# Patient Record
Sex: MALE | Race: OTHER | HISPANIC OR LATINO | URBAN - METROPOLITAN AREA
[De-identification: names, ages, dates, MRNs, and addresses within clinical notes are randomized per-mention and may not be internally consistent; named-entity substitution may affect disease eponyms.]

---

## 2023-11-14 ENCOUNTER — INPATIENT (INPATIENT)
Facility: HOSPITAL | Age: 38
LOS: 4 days | Discharge: HOME | DRG: 896 | End: 2023-11-19
Attending: INTERNAL MEDICINE | Admitting: INTERNAL MEDICINE
Payer: COMMERCIAL

## 2023-11-14 VITALS
DIASTOLIC BLOOD PRESSURE: 84 MMHG | RESPIRATION RATE: 17 BRPM | WEIGHT: 160.06 LBS | TEMPERATURE: 99 F | HEART RATE: 122 BPM | HEIGHT: 64 IN | OXYGEN SATURATION: 98 % | SYSTOLIC BLOOD PRESSURE: 146 MMHG

## 2023-11-14 DIAGNOSIS — K76.0 FATTY (CHANGE OF) LIVER, NOT ELSEWHERE CLASSIFIED: ICD-10-CM

## 2023-11-14 DIAGNOSIS — I10 ESSENTIAL (PRIMARY) HYPERTENSION: ICD-10-CM

## 2023-11-14 DIAGNOSIS — K22.89 OTHER SPECIFIED DISEASE OF ESOPHAGUS: ICD-10-CM

## 2023-11-14 DIAGNOSIS — E87.6 HYPOKALEMIA: ICD-10-CM

## 2023-11-14 DIAGNOSIS — F10.939 ALCOHOL USE, UNSPECIFIED WITH WITHDRAWAL, UNSPECIFIED: ICD-10-CM

## 2023-11-14 DIAGNOSIS — K85.90 ACUTE PANCREATITIS WITHOUT NECROSIS OR INFECTION, UNSPECIFIED: ICD-10-CM

## 2023-11-14 DIAGNOSIS — Z29.9 ENCOUNTER FOR PROPHYLACTIC MEASURES, UNSPECIFIED: ICD-10-CM

## 2023-11-14 LAB
ALBUMIN SERPL ELPH-MCNC: 2.5 G/DL — LOW (ref 3.5–5)
ALBUMIN SERPL ELPH-MCNC: 2.5 G/DL — LOW (ref 3.5–5)
ALBUMIN SERPL ELPH-MCNC: 3.2 G/DL — LOW (ref 3.5–5)
ALBUMIN SERPL ELPH-MCNC: 3.2 G/DL — LOW (ref 3.5–5)
ALP SERPL-CCNC: 133 U/L — HIGH (ref 40–120)
ALP SERPL-CCNC: 133 U/L — HIGH (ref 40–120)
ALP SERPL-CCNC: 98 U/L — SIGNIFICANT CHANGE UP (ref 40–120)
ALP SERPL-CCNC: 98 U/L — SIGNIFICANT CHANGE UP (ref 40–120)
ALT FLD-CCNC: 29 U/L DA — SIGNIFICANT CHANGE UP (ref 10–60)
ALT FLD-CCNC: 29 U/L DA — SIGNIFICANT CHANGE UP (ref 10–60)
ALT FLD-CCNC: 36 U/L DA — SIGNIFICANT CHANGE UP (ref 10–60)
ALT FLD-CCNC: 36 U/L DA — SIGNIFICANT CHANGE UP (ref 10–60)
ANION GAP SERPL CALC-SCNC: 10 MMOL/L — SIGNIFICANT CHANGE UP (ref 5–17)
ANION GAP SERPL CALC-SCNC: 10 MMOL/L — SIGNIFICANT CHANGE UP (ref 5–17)
ANION GAP SERPL CALC-SCNC: 12 MMOL/L — SIGNIFICANT CHANGE UP (ref 5–17)
ANION GAP SERPL CALC-SCNC: 12 MMOL/L — SIGNIFICANT CHANGE UP (ref 5–17)
APAP SERPL-MCNC: <2 UG/ML — LOW (ref 10–30)
APAP SERPL-MCNC: <2 UG/ML — LOW (ref 10–30)
APPEARANCE UR: CLEAR — SIGNIFICANT CHANGE UP
APPEARANCE UR: CLEAR — SIGNIFICANT CHANGE UP
AST SERPL-CCNC: 34 U/L — SIGNIFICANT CHANGE UP (ref 10–40)
AST SERPL-CCNC: 34 U/L — SIGNIFICANT CHANGE UP (ref 10–40)
AST SERPL-CCNC: 49 U/L — HIGH (ref 10–40)
AST SERPL-CCNC: 49 U/L — HIGH (ref 10–40)
BACTERIA # UR AUTO: NEGATIVE /HPF — SIGNIFICANT CHANGE UP
BACTERIA # UR AUTO: NEGATIVE /HPF — SIGNIFICANT CHANGE UP
BASOPHILS # BLD AUTO: 0.04 K/UL — SIGNIFICANT CHANGE UP (ref 0–0.2)
BASOPHILS # BLD AUTO: 0.04 K/UL — SIGNIFICANT CHANGE UP (ref 0–0.2)
BASOPHILS NFR BLD AUTO: 0.6 % — SIGNIFICANT CHANGE UP (ref 0–2)
BASOPHILS NFR BLD AUTO: 0.6 % — SIGNIFICANT CHANGE UP (ref 0–2)
BILIRUB SERPL-MCNC: 1 MG/DL — SIGNIFICANT CHANGE UP (ref 0.2–1.2)
BILIRUB SERPL-MCNC: 1 MG/DL — SIGNIFICANT CHANGE UP (ref 0.2–1.2)
BILIRUB SERPL-MCNC: 1.1 MG/DL — SIGNIFICANT CHANGE UP (ref 0.2–1.2)
BILIRUB SERPL-MCNC: 1.1 MG/DL — SIGNIFICANT CHANGE UP (ref 0.2–1.2)
BILIRUB UR-MCNC: NEGATIVE — SIGNIFICANT CHANGE UP
BILIRUB UR-MCNC: NEGATIVE — SIGNIFICANT CHANGE UP
BUN SERPL-MCNC: 8 MG/DL — SIGNIFICANT CHANGE UP (ref 7–18)
CALCIUM SERPL-MCNC: 6.6 MG/DL — LOW (ref 8.4–10.5)
CALCIUM SERPL-MCNC: 6.6 MG/DL — LOW (ref 8.4–10.5)
CALCIUM SERPL-MCNC: 8.2 MG/DL — LOW (ref 8.4–10.5)
CALCIUM SERPL-MCNC: 8.2 MG/DL — LOW (ref 8.4–10.5)
CHLORIDE SERPL-SCNC: 106 MMOL/L — SIGNIFICANT CHANGE UP (ref 96–108)
CHLORIDE SERPL-SCNC: 106 MMOL/L — SIGNIFICANT CHANGE UP (ref 96–108)
CHLORIDE SERPL-SCNC: 99 MMOL/L — SIGNIFICANT CHANGE UP (ref 96–108)
CHLORIDE SERPL-SCNC: 99 MMOL/L — SIGNIFICANT CHANGE UP (ref 96–108)
CO2 SERPL-SCNC: 22 MMOL/L — SIGNIFICANT CHANGE UP (ref 22–31)
CO2 SERPL-SCNC: 22 MMOL/L — SIGNIFICANT CHANGE UP (ref 22–31)
CO2 SERPL-SCNC: 25 MMOL/L — SIGNIFICANT CHANGE UP (ref 22–31)
CO2 SERPL-SCNC: 25 MMOL/L — SIGNIFICANT CHANGE UP (ref 22–31)
COLOR SPEC: YELLOW — SIGNIFICANT CHANGE UP
COLOR SPEC: YELLOW — SIGNIFICANT CHANGE UP
CREAT SERPL-MCNC: 0.71 MG/DL — SIGNIFICANT CHANGE UP (ref 0.5–1.3)
CREAT SERPL-MCNC: 0.71 MG/DL — SIGNIFICANT CHANGE UP (ref 0.5–1.3)
CREAT SERPL-MCNC: 0.79 MG/DL — SIGNIFICANT CHANGE UP (ref 0.5–1.3)
CREAT SERPL-MCNC: 0.79 MG/DL — SIGNIFICANT CHANGE UP (ref 0.5–1.3)
DIFF PNL FLD: NEGATIVE — SIGNIFICANT CHANGE UP
DIFF PNL FLD: NEGATIVE — SIGNIFICANT CHANGE UP
EGFR: 115 ML/MIN/1.73M2 — SIGNIFICANT CHANGE UP
EGFR: 115 ML/MIN/1.73M2 — SIGNIFICANT CHANGE UP
EGFR: 119 ML/MIN/1.73M2 — SIGNIFICANT CHANGE UP
EGFR: 119 ML/MIN/1.73M2 — SIGNIFICANT CHANGE UP
EOSINOPHIL # BLD AUTO: 0.02 K/UL — SIGNIFICANT CHANGE UP (ref 0–0.5)
EOSINOPHIL # BLD AUTO: 0.02 K/UL — SIGNIFICANT CHANGE UP (ref 0–0.5)
EOSINOPHIL NFR BLD AUTO: 0.3 % — SIGNIFICANT CHANGE UP (ref 0–6)
EOSINOPHIL NFR BLD AUTO: 0.3 % — SIGNIFICANT CHANGE UP (ref 0–6)
EPI CELLS # UR: PRESENT
EPI CELLS # UR: PRESENT
ETHANOL SERPL-MCNC: 351 MG/DL — HIGH (ref 0–10)
ETHANOL SERPL-MCNC: 351 MG/DL — HIGH (ref 0–10)
GLUCOSE SERPL-MCNC: 124 MG/DL — HIGH (ref 70–99)
GLUCOSE SERPL-MCNC: 124 MG/DL — HIGH (ref 70–99)
GLUCOSE SERPL-MCNC: 156 MG/DL — HIGH (ref 70–99)
GLUCOSE SERPL-MCNC: 156 MG/DL — HIGH (ref 70–99)
GLUCOSE UR QL: NEGATIVE MG/DL — SIGNIFICANT CHANGE UP
GLUCOSE UR QL: NEGATIVE MG/DL — SIGNIFICANT CHANGE UP
HCT VFR BLD CALC: 47 % — SIGNIFICANT CHANGE UP (ref 39–50)
HCT VFR BLD CALC: 47 % — SIGNIFICANT CHANGE UP (ref 39–50)
HGB BLD-MCNC: 16.6 G/DL — SIGNIFICANT CHANGE UP (ref 13–17)
HGB BLD-MCNC: 16.6 G/DL — SIGNIFICANT CHANGE UP (ref 13–17)
HIV 1 & 2 AB SERPL IA.RAPID: SIGNIFICANT CHANGE UP
HIV 1 & 2 AB SERPL IA.RAPID: SIGNIFICANT CHANGE UP
IMM GRANULOCYTES NFR BLD AUTO: 0.3 % — SIGNIFICANT CHANGE UP (ref 0–0.9)
IMM GRANULOCYTES NFR BLD AUTO: 0.3 % — SIGNIFICANT CHANGE UP (ref 0–0.9)
KETONES UR-MCNC: NEGATIVE MG/DL — SIGNIFICANT CHANGE UP
KETONES UR-MCNC: NEGATIVE MG/DL — SIGNIFICANT CHANGE UP
LEUKOCYTE ESTERASE UR-ACNC: NEGATIVE — SIGNIFICANT CHANGE UP
LEUKOCYTE ESTERASE UR-ACNC: NEGATIVE — SIGNIFICANT CHANGE UP
LIDOCAIN IGE QN: 620 U/L — HIGH (ref 13–75)
LIDOCAIN IGE QN: 620 U/L — HIGH (ref 13–75)
LYMPHOCYTES # BLD AUTO: 2.14 K/UL — SIGNIFICANT CHANGE UP (ref 1–3.3)
LYMPHOCYTES # BLD AUTO: 2.14 K/UL — SIGNIFICANT CHANGE UP (ref 1–3.3)
LYMPHOCYTES # BLD AUTO: 30.1 % — SIGNIFICANT CHANGE UP (ref 13–44)
LYMPHOCYTES # BLD AUTO: 30.1 % — SIGNIFICANT CHANGE UP (ref 13–44)
MAGNESIUM SERPL-MCNC: 2.5 MG/DL — SIGNIFICANT CHANGE UP (ref 1.6–2.6)
MAGNESIUM SERPL-MCNC: 2.5 MG/DL — SIGNIFICANT CHANGE UP (ref 1.6–2.6)
MCHC RBC-ENTMCNC: 30.4 PG — SIGNIFICANT CHANGE UP (ref 27–34)
MCHC RBC-ENTMCNC: 30.4 PG — SIGNIFICANT CHANGE UP (ref 27–34)
MCHC RBC-ENTMCNC: 35.3 GM/DL — SIGNIFICANT CHANGE UP (ref 32–36)
MCHC RBC-ENTMCNC: 35.3 GM/DL — SIGNIFICANT CHANGE UP (ref 32–36)
MCV RBC AUTO: 86.1 FL — SIGNIFICANT CHANGE UP (ref 80–100)
MCV RBC AUTO: 86.1 FL — SIGNIFICANT CHANGE UP (ref 80–100)
MONOCYTES # BLD AUTO: 0.66 K/UL — SIGNIFICANT CHANGE UP (ref 0–0.9)
MONOCYTES # BLD AUTO: 0.66 K/UL — SIGNIFICANT CHANGE UP (ref 0–0.9)
MONOCYTES NFR BLD AUTO: 9.3 % — SIGNIFICANT CHANGE UP (ref 2–14)
MONOCYTES NFR BLD AUTO: 9.3 % — SIGNIFICANT CHANGE UP (ref 2–14)
NEUTROPHILS # BLD AUTO: 4.24 K/UL — SIGNIFICANT CHANGE UP (ref 1.8–7.4)
NEUTROPHILS # BLD AUTO: 4.24 K/UL — SIGNIFICANT CHANGE UP (ref 1.8–7.4)
NEUTROPHILS NFR BLD AUTO: 59.4 % — SIGNIFICANT CHANGE UP (ref 43–77)
NEUTROPHILS NFR BLD AUTO: 59.4 % — SIGNIFICANT CHANGE UP (ref 43–77)
NITRITE UR-MCNC: NEGATIVE — SIGNIFICANT CHANGE UP
NITRITE UR-MCNC: NEGATIVE — SIGNIFICANT CHANGE UP
NRBC # BLD: 0 /100 WBCS — SIGNIFICANT CHANGE UP (ref 0–0)
NRBC # BLD: 0 /100 WBCS — SIGNIFICANT CHANGE UP (ref 0–0)
PH UR: 7 — SIGNIFICANT CHANGE UP (ref 5–8)
PH UR: 7 — SIGNIFICANT CHANGE UP (ref 5–8)
PHOSPHATE SERPL-MCNC: 1.9 MG/DL — LOW (ref 2.5–4.5)
PHOSPHATE SERPL-MCNC: 1.9 MG/DL — LOW (ref 2.5–4.5)
PLATELET # BLD AUTO: 138 K/UL — LOW (ref 150–400)
PLATELET # BLD AUTO: 138 K/UL — LOW (ref 150–400)
POTASSIUM SERPL-MCNC: 3 MMOL/L — LOW (ref 3.5–5.3)
POTASSIUM SERPL-MCNC: 3 MMOL/L — LOW (ref 3.5–5.3)
POTASSIUM SERPL-MCNC: 3.2 MMOL/L — LOW (ref 3.5–5.3)
POTASSIUM SERPL-MCNC: 3.2 MMOL/L — LOW (ref 3.5–5.3)
POTASSIUM SERPL-SCNC: 3 MMOL/L — LOW (ref 3.5–5.3)
POTASSIUM SERPL-SCNC: 3 MMOL/L — LOW (ref 3.5–5.3)
POTASSIUM SERPL-SCNC: 3.2 MMOL/L — LOW (ref 3.5–5.3)
POTASSIUM SERPL-SCNC: 3.2 MMOL/L — LOW (ref 3.5–5.3)
PROT SERPL-MCNC: 5.9 G/DL — LOW (ref 6–8.3)
PROT SERPL-MCNC: 5.9 G/DL — LOW (ref 6–8.3)
PROT SERPL-MCNC: 7.5 G/DL — SIGNIFICANT CHANGE UP (ref 6–8.3)
PROT SERPL-MCNC: 7.5 G/DL — SIGNIFICANT CHANGE UP (ref 6–8.3)
PROT UR-MCNC: ABNORMAL MG/DL
PROT UR-MCNC: ABNORMAL MG/DL
RBC # BLD: 5.46 M/UL — SIGNIFICANT CHANGE UP (ref 4.2–5.8)
RBC # BLD: 5.46 M/UL — SIGNIFICANT CHANGE UP (ref 4.2–5.8)
RBC # FLD: 13.3 % — SIGNIFICANT CHANGE UP (ref 10.3–14.5)
RBC # FLD: 13.3 % — SIGNIFICANT CHANGE UP (ref 10.3–14.5)
RBC CASTS # UR COMP ASSIST: 2 /HPF — SIGNIFICANT CHANGE UP (ref 0–4)
RBC CASTS # UR COMP ASSIST: 2 /HPF — SIGNIFICANT CHANGE UP (ref 0–4)
SALICYLATES SERPL-MCNC: <1.7 MG/DL — LOW (ref 2.8–20)
SALICYLATES SERPL-MCNC: <1.7 MG/DL — LOW (ref 2.8–20)
SODIUM SERPL-SCNC: 136 MMOL/L — SIGNIFICANT CHANGE UP (ref 135–145)
SODIUM SERPL-SCNC: 136 MMOL/L — SIGNIFICANT CHANGE UP (ref 135–145)
SODIUM SERPL-SCNC: 138 MMOL/L — SIGNIFICANT CHANGE UP (ref 135–145)
SODIUM SERPL-SCNC: 138 MMOL/L — SIGNIFICANT CHANGE UP (ref 135–145)
SP GR SPEC: 1.01 — SIGNIFICANT CHANGE UP (ref 1–1.03)
SP GR SPEC: 1.01 — SIGNIFICANT CHANGE UP (ref 1–1.03)
TROPONIN I, HIGH SENSITIVITY RESULT: 7.2 NG/L — SIGNIFICANT CHANGE UP
TROPONIN I, HIGH SENSITIVITY RESULT: 7.2 NG/L — SIGNIFICANT CHANGE UP
UROBILINOGEN FLD QL: 0.2 MG/DL — SIGNIFICANT CHANGE UP (ref 0.2–1)
UROBILINOGEN FLD QL: 0.2 MG/DL — SIGNIFICANT CHANGE UP (ref 0.2–1)
WBC # BLD: 7.12 K/UL — SIGNIFICANT CHANGE UP (ref 3.8–10.5)
WBC # BLD: 7.12 K/UL — SIGNIFICANT CHANGE UP (ref 3.8–10.5)
WBC # FLD AUTO: 7.12 K/UL — SIGNIFICANT CHANGE UP (ref 3.8–10.5)
WBC # FLD AUTO: 7.12 K/UL — SIGNIFICANT CHANGE UP (ref 3.8–10.5)
WBC UR QL: 4 /HPF — SIGNIFICANT CHANGE UP (ref 0–5)
WBC UR QL: 4 /HPF — SIGNIFICANT CHANGE UP (ref 0–5)

## 2023-11-14 PROCEDURE — 99285 EMERGENCY DEPT VISIT HI MDM: CPT

## 2023-11-14 PROCEDURE — 74177 CT ABD & PELVIS W/CONTRAST: CPT | Mod: 26,MA

## 2023-11-14 RX ORDER — ONDANSETRON 8 MG/1
4 TABLET, FILM COATED ORAL ONCE
Refills: 0 | Status: COMPLETED | OUTPATIENT
Start: 2023-11-14 | End: 2023-11-14

## 2023-11-14 RX ORDER — MORPHINE SULFATE 50 MG/1
2 CAPSULE, EXTENDED RELEASE ORAL EVERY 6 HOURS
Refills: 0 | Status: DISCONTINUED | OUTPATIENT
Start: 2023-11-14 | End: 2023-11-16

## 2023-11-14 RX ORDER — FOLIC ACID 0.8 MG
1 TABLET ORAL ONCE
Refills: 0 | Status: COMPLETED | OUTPATIENT
Start: 2023-11-14 | End: 2023-11-14

## 2023-11-14 RX ORDER — THIAMINE MONONITRATE (VIT B1) 100 MG
500 TABLET ORAL THREE TIMES A DAY
Refills: 0 | Status: DISCONTINUED | OUTPATIENT
Start: 2023-11-14 | End: 2023-11-16

## 2023-11-14 RX ORDER — SODIUM CHLORIDE 9 MG/ML
2000 INJECTION INTRAMUSCULAR; INTRAVENOUS; SUBCUTANEOUS ONCE
Refills: 0 | Status: COMPLETED | OUTPATIENT
Start: 2023-11-14 | End: 2023-11-14

## 2023-11-14 RX ORDER — ENOXAPARIN SODIUM 100 MG/ML
40 INJECTION SUBCUTANEOUS EVERY 24 HOURS
Refills: 0 | Status: DISCONTINUED | OUTPATIENT
Start: 2023-11-14 | End: 2023-11-19

## 2023-11-14 RX ORDER — FOLIC ACID 0.8 MG
1 TABLET ORAL DAILY
Refills: 0 | Status: ACTIVE | OUTPATIENT
Start: 2023-11-14 | End: 2024-10-12

## 2023-11-14 RX ORDER — SODIUM CHLORIDE 9 MG/ML
1000 INJECTION, SOLUTION INTRAVENOUS
Refills: 0 | Status: DISCONTINUED | OUTPATIENT
Start: 2023-11-14 | End: 2023-11-14

## 2023-11-14 RX ORDER — ONDANSETRON 8 MG/1
4 TABLET, FILM COATED ORAL EVERY 8 HOURS
Refills: 0 | Status: DISCONTINUED | OUTPATIENT
Start: 2023-11-14 | End: 2023-11-19

## 2023-11-14 RX ORDER — DIAZEPAM 5 MG
5 TABLET ORAL ONCE
Refills: 0 | Status: DISCONTINUED | OUTPATIENT
Start: 2023-11-14 | End: 2023-11-14

## 2023-11-14 RX ORDER — SODIUM,POTASSIUM PHOSPHATES 278-250MG
1 POWDER IN PACKET (EA) ORAL ONCE
Refills: 0 | Status: COMPLETED | OUTPATIENT
Start: 2023-11-14 | End: 2023-11-14

## 2023-11-14 RX ORDER — MULTIVIT-MIN/FERROUS GLUCONATE 9 MG/15 ML
1 LIQUID (ML) ORAL DAILY
Refills: 0 | Status: DISCONTINUED | OUTPATIENT
Start: 2023-11-14 | End: 2023-11-19

## 2023-11-14 RX ORDER — THIAMINE MONONITRATE (VIT B1) 100 MG
100 TABLET ORAL ONCE
Refills: 0 | Status: COMPLETED | OUTPATIENT
Start: 2023-11-14 | End: 2023-11-14

## 2023-11-14 RX ORDER — IOHEXOL 300 MG/ML
30 INJECTION, SOLUTION INTRAVENOUS ONCE
Refills: 0 | Status: DISCONTINUED | OUTPATIENT
Start: 2023-11-14 | End: 2023-11-18

## 2023-11-14 RX ORDER — HYDROMORPHONE HYDROCHLORIDE 2 MG/ML
1 INJECTION INTRAMUSCULAR; INTRAVENOUS; SUBCUTANEOUS EVERY 6 HOURS
Refills: 0 | Status: DISCONTINUED | OUTPATIENT
Start: 2023-11-14 | End: 2023-11-16

## 2023-11-14 RX ORDER — POTASSIUM CHLORIDE 20 MEQ
40 PACKET (EA) ORAL ONCE
Refills: 0 | Status: COMPLETED | OUTPATIENT
Start: 2023-11-14 | End: 2023-11-14

## 2023-11-14 RX ORDER — SODIUM CHLORIDE 9 MG/ML
1000 INJECTION, SOLUTION INTRAVENOUS
Refills: 0 | Status: DISCONTINUED | OUTPATIENT
Start: 2023-11-14 | End: 2023-11-15

## 2023-11-14 RX ORDER — SODIUM CHLORIDE 9 MG/ML
500 INJECTION, SOLUTION INTRAVENOUS ONCE
Refills: 0 | Status: COMPLETED | OUTPATIENT
Start: 2023-11-14 | End: 2023-11-14

## 2023-11-14 RX ORDER — PANTOPRAZOLE SODIUM 20 MG/1
40 TABLET, DELAYED RELEASE ORAL DAILY
Refills: 0 | Status: DISCONTINUED | OUTPATIENT
Start: 2023-11-14 | End: 2023-11-18

## 2023-11-14 RX ORDER — MORPHINE SULFATE 50 MG/1
2 CAPSULE, EXTENDED RELEASE ORAL EVERY 6 HOURS
Refills: 0 | Status: DISCONTINUED | OUTPATIENT
Start: 2023-11-14 | End: 2023-11-14

## 2023-11-14 RX ORDER — MORPHINE SULFATE 50 MG/1
4 CAPSULE, EXTENDED RELEASE ORAL ONCE
Refills: 0 | Status: DISCONTINUED | OUTPATIENT
Start: 2023-11-14 | End: 2023-11-14

## 2023-11-14 RX ADMIN — ONDANSETRON 4 MILLIGRAM(S): 8 TABLET, FILM COATED ORAL at 22:59

## 2023-11-14 RX ADMIN — Medication 2 MILLIGRAM(S): at 22:59

## 2023-11-14 RX ADMIN — ONDANSETRON 4 MILLIGRAM(S): 8 TABLET, FILM COATED ORAL at 13:13

## 2023-11-14 RX ADMIN — Medication 1 TABLET(S): at 13:12

## 2023-11-14 RX ADMIN — SODIUM CHLORIDE 4000 MILLILITER(S): 9 INJECTION INTRAMUSCULAR; INTRAVENOUS; SUBCUTANEOUS at 13:14

## 2023-11-14 RX ADMIN — Medication 101 MILLIGRAM(S): at 17:17

## 2023-11-14 RX ADMIN — SODIUM CHLORIDE 150 MILLILITER(S): 9 INJECTION, SOLUTION INTRAVENOUS at 23:00

## 2023-11-14 RX ADMIN — Medication 40 MILLIEQUIVALENT(S): at 16:08

## 2023-11-14 RX ADMIN — ONDANSETRON 4 MILLIGRAM(S): 8 TABLET, FILM COATED ORAL at 19:51

## 2023-11-14 RX ADMIN — Medication 5 MILLIGRAM(S): at 17:14

## 2023-11-14 RX ADMIN — ENOXAPARIN SODIUM 40 MILLIGRAM(S): 100 INJECTION SUBCUTANEOUS at 19:51

## 2023-11-14 RX ADMIN — Medication 100 MILLIGRAM(S): at 13:13

## 2023-11-14 RX ADMIN — Medication 50 MILLIGRAM(S): at 13:12

## 2023-11-14 RX ADMIN — MORPHINE SULFATE 4 MILLIGRAM(S): 50 CAPSULE, EXTENDED RELEASE ORAL at 13:13

## 2023-11-14 RX ADMIN — Medication 1 MILLIGRAM(S): at 13:12

## 2023-11-14 RX ADMIN — SODIUM CHLORIDE 500 MILLILITER(S): 9 INJECTION, SOLUTION INTRAVENOUS at 19:51

## 2023-11-14 RX ADMIN — ONDANSETRON 4 MILLIGRAM(S): 8 TABLET, FILM COATED ORAL at 17:13

## 2023-11-14 NOTE — ED PROVIDER NOTE - CLINICAL SUMMARY MEDICAL DECISION MAKING FREE TEXT BOX
38 male with EtOH abuse presenting to the ED with diffuse abdominal pain/tenderness & multiple episodes of vomiting. Dehydrated appearing. Tremulous concerning for EtOH withdrawal    Vitals w tachycardia    Nontoxic appearing, n/v intact.    Plan to obtain:    -Labs, EKG, CT (r/o bowel obstruction, intra-abdominal infection, appendicitis, diverticulitis, colitis, abscess, perforation, or pancreatitis), IV fluids, analgesia/antiemetics as needed, benzodiazepines as needed for w/d, admit    Lab values demonstrate no acute/emergent pathology.    Independent interpretation of the EKG: Sinus @ ***, normal axis, normal intervals, normal ST/T  Independent interpretation of XR: ***    ***    Pt advised regarding need for close outpatient follow up.  Patient stable for further care in outpatient setting. No indication for inpatient admission at this time. Patient advised regarding symptomatic & supportive care and symptoms to prompt ED return. Strict return precautions provided.  ***  Patient requires inpatient admission for further care & stabilization. Care signed out to inpatient team. 38 male with EtOH abuse presenting to the ED with diffuse abdominal pain/tenderness & multiple episodes of vomiting. Dehydrated appearing. Tremulous concerning for EtOH withdrawal    Vitals w tachycardia    Nontoxic appearing, n/v intact.    Plan to obtain:    -Labs, EKG, CT (r/o bowel obstruction, intra-abdominal infection, appendicitis, diverticulitis, colitis, abscess, perforation, or pancreatitis), IV fluids, analgesia/antiemetics as needed, benzodiazepines as needed for w/d, admit    Lab values w elevated lipase, abnormal LFT, & elevated EtOH  Independent interpretation of the EKG: Sinus @ 115, normal axis, normal intervals, normal ST/T  IV fluids, analgesia, & antiemetics given  IV benzo's given for withdrawal    CT pending at time of sign out.

## 2023-11-14 NOTE — H&P ADULT - ATTENDING COMMENTS
Patient is a 38 year old male from home, ambulates independently, with PMH of HTN, alcohol use disorder with history of seizures 2/2 alcohol withdrawal earlier this year who presented to the ED with abdominal pain.  History difficult to obtain due to patient vomiting.  patient states starting 4 days ago he had significant abdominal pain, nausea and vomiting.  patient states he has had similar episodes to this in the past and was told he had pancreatitis.  Patient has previoulsy been hospitalized for this in the past, most recently in [facility] earlier this year.  Patient states he is a heavy drinker and drinks 14 cans of beers daily.  patient states his last drink was this AM.  Patient states he has previoulsy been hospitalized for alcohol withdrawal and seizures 2/2 alcohol withdrawal however has never been intubated.  Patient states 7 months ago he was admitted to an ICU for seizures 2/2 alcohol withdrawal.  patient states the pain does not radiate to the back currently.  Patient states the pain is diffuse constant abdominal pain, worst in the epigastrium and after eating.  Patient denies nausea, vomiting, headache, blurry vision, dizziness, chest pain, abdominal pain, constipation, diarrhea, leg swelling.   seen and examined  admitted for etoh intoxication and pancreatitis   pt is a heavy etoh  and as per him dx Pancreatitis over a year   ahs been drinking dozen of beer every day and allan 3-4 days has abd pain with vomiting  as per him had blood in vomiting before but not at this time   vs noted  tachycardia  130  rr 18    awake alert on bed  not in nay distress  nauseous    lungs heart  ok abd soft bs nml tender upper abd    cns tremers but alert awake   labs noted  k 3.0  lipase 620  etoh level 351    ct abd pancreatitis  yhickening of distal esophagitis   a/p acute pancraetitis  NPO  IV HYDRATIN  BMP  WATCH FOR K, MG    CBC  RENAL FUNCTIONS   TREMERS  dT  LIBRIUM/ATIVAN  GET utox  CXR

## 2023-11-14 NOTE — ED ADULT NURSE NOTE - OBJECTIVE STATEMENT
pt is a 38 y.o. male c/o alcohol withdrawal, pt reports last drink was today which was beer, pt reports a hx of high blood pressure, pt is ax4, and pt reports he is trying to stop drinking.

## 2023-11-14 NOTE — ED PROVIDER NOTE - PROGRESS NOTE DETAILS
Jose Rafaelks-DO: pt received at sign-out.  Pt sitting comfortably in NAD. Signout pending CT and admission. CT showing uncomplicated pancreatitis. Most recent CIWA 5. Discussed with hospitalist and MAR re: admission.

## 2023-11-14 NOTE — H&P ADULT - ASSESSMENT
Patient is a 38 year old male from home, ambulates independently, with PMH of HTN, alcohol use disorder with history of seizures 2/2 alcohol withdrawal earlier this year who presented to the ED with abdominal pain. In the ED patient had elevated BAL and elevated CIWA score.  Patient with CT A/P showing acute pancreatitis, dilated esophagus, and hepatic steatosis.  Patient is being admitted for acute pancreatitis and alcohol withdrawal.     Primary team to attempt to get records from prior hospitalizations at [facility].  Patient states he has had extensive workup in the past including possible Echo and US of liver.

## 2023-11-14 NOTE — H&P ADULT - PROBLEM SELECTOR PLAN 2
- Patient with history of alcohol use disorder  - Patient with history of seizures 2/2 alcohol withdrwal  - Patient drinks 14 beers daily  - Patients last drink this AM  - Start CIWA protocol  - Start Libruim leti  - Start Ativan PRN  - Start Folic Acid  - Start IV Thiamine 500mg TID  - Start Multivitamin  - Monitor electrolytes closely, replete as indicated    - Social work consulted

## 2023-11-14 NOTE — H&P ADULT - PROBLEM SELECTOR PLAN 3
- K of 3.2 on admission  - Monitor electrolytes closely, replete as indicated    - Repleted by ED with PO KCl 1 40meq  - F/U Repeat CMP @8

## 2023-11-14 NOTE — H&P ADULT - PROBLEM SELECTOR PLAN 1
- Patient with abdominal pain nausea, and vomiting for the past 4 days  - Patient with history of pancreatitis  - Patient tachycardic in ED, otherwise Hemodynamically stable and afebrile  - CT A/P: showing acute pancreatitis, dilated esophagus, and hepatic steatosis  - S/P 2L NS in ED  - Will give LR bolus now  - Start LR@250cc/hr for pancreatitis   - Start pain control with Morphine for moderate and Dilaudid for severe  - Zofran PRN  - PPI  - NPO  - Monitor electrolytes closely, replete as indicated    - GI Consult in AM

## 2023-11-14 NOTE — H&P ADULT - NSHPPHYSICALEXAM_GEN_ALL_CORE
T(C): 36.9 (11-14-23 @ 16:56), Max: 37 (11-14-23 @ 10:46)  T(F): 98.4 (11-14-23 @ 16:56), Max: 98.6 (11-14-23 @ 10:46)  HR: 125 (11-14-23 @ 16:56) (122 - 125)  BP: 131/80 (11-14-23 @ 16:56) (131/80 - 146/84)  RR: 20 (11-14-23 @ 16:56) (17 - 20)  SpO2: 97% (11-14-23 @ 16:56) (97% - 98%)    GENERAL: lying in bed; Vomiting during exam. Tremulous on exam  HEAD:  Atraumatic, Normocephalic  EYES: conjunctiva and sclera clear  ENMT: No tonsillar erythema, exudates, or enlargement;   NECK: Supple, normal appearance, No JVD;   NERVOUS SYSTEM:  Alert & Oriented X3,  non focal; elevated CIWA, tremulous  CHEST/LUNG: Lungs clear to auscultation bilaterally, No rales, rhonchi, wheezing   HEART: Tachycardic, Regular rhythm; No murmurs, rubs, or gallops  ABDOMEN: Soft, diffusely tender to palpation, worse in epigastrium, Nondistended;  EXTREMITIES:  2+ Peripheral Pulses, No clubbing, cyanosis, or edema  SKIN: No rashes or lesions;

## 2023-11-14 NOTE — ED PROVIDER NOTE - NS ED MD DISPO ISOLATION TYPES
Anxiety    Depression    Depression    High cholesterol    Hypertension    Hypertension, unspecified type    Mood disorder
None

## 2023-11-14 NOTE — ED PROVIDER NOTE - NS ED ROS FT
Constitutional: (-) fever (-) chills  HENT: (-) congestion (-) rhinorrhea (-) sore throat  Eyes: (-) pain (-) redness  Respiratory: (-) cough (-) shortness of breath (-) wheezing (-) stridor    Cardiovascular: (-) chest pain (-) palpitations (-) leg swelling  Gastrointestinal: (+) abdominal pain (-) blood in stool (no melena/hematochezia) (-) diarrhea (+) vomiting  Genitourinary: (-) dysuria (-) hematuria  Musculoskeletal: (-) gait problem (-) joint swelling (-) myalgias  Skin: (-) color change (-) rash  Neurological: (-) weakness (-) numbness (-) headaches (+) tremors  Psychiatric/Behavioral: (-) confusion

## 2023-11-14 NOTE — ED PROVIDER NOTE - OBJECTIVE STATEMENT
38 male with hx of EtOH abuse.   Pt presenting to the ED reporting diffuse abdominal pain & vomiting progressively worsening for the past 3 weeks with decreased p.o. intake.  Also with decreased EtOH intake.

## 2023-11-14 NOTE — ED ADULT NURSE NOTE - NSFALLRISKINTERV_ED_ALL_ED
Assistance OOB with selected safe patient handling equipment if applicable/Assistance with ambulation/Communicate fall risk and risk factors to all staff, patient, and family/Monitor gait and stability/Monitor for mental status changes and reorient to person, place, and time, as needed/Provide visual cue: yellow wristband, yellow gown, etc/Reinforce activity limits and safety measures with patient and family/Toileting schedule using arm’s reach rule for commode and bathroom/Use of alarms - bed, stretcher, chair and/or video monitoring/Call bell, personal items and telephone in reach/Instruct patient to call for assistance before getting out of bed/chair/stretcher/Non-slip footwear applied when patient is off stretcher/[place] to call system/Physically safe environment - no spills, clutter or unnecessary equipment/Purposeful Proactive Rounding/Room/bathroom lighting operational, light cord in reach

## 2023-11-14 NOTE — H&P ADULT - NSHPSOCIALHISTORY_GEN_ALL_CORE
38 yea old male, lives at home with wife.  Denies tobacco use.  Drinks 14 cans of beer daily.  Has previously enrolled in detox.

## 2023-11-14 NOTE — H&P ADULT - HISTORY OF PRESENT ILLNESS
Patient is a 38 year old male from home, ambulates independently, with PMH of HTN, alcohol use disorder with history of seizures 2/2 alcohol withdrawal earlier this year who presented to the ED .    Patient denies nausea, vomiting, headache, blurry vision, dizziness, chest pain, abdominal pain, constipation, diarrhea, leg swelling.  Patient is a 38 year old male from home, ambulates independently, with PMH of HTN, alcohol use disorder with history of seizures 2/2 alcohol withdrawal earlier this year who presented to the ED with abdominal pain.  History difficult to obtain due to patient vomiting.  patient states starting 4 days ago he had significant abdominal pain, nausea and vomiting.  patient states he has had similar episodes to this in the past and was told he had pancreatitis.  Patient has previoulsy been hospitalized for this in the past, most recently in [facility] earlier this year.  Patient states he is a heavy drinker and drinks 14 cans of beers daily.  patient states his last drink was this AM.  Patient states he has previoulsy been hospitalized for alcohol withdrawal and seizures 2/2 alcohol withdrawal however has never been intubated.  Patient states 7 months ago he was admitted to an ICU for seizures 2/2 alcohol withdrawal.  patient states the pain does not radiate to the back currently.  Patient states the pain is diffuse constant abdominal pain, worst in the epigastrium and after eating.  Patient denies nausea, vomiting, headache, blurry vision, dizziness, chest pain, abdominal pain, constipation, diarrhea, leg swelling.     Patient states he Has previously enrolled in detox.  Patient denies taking any medications at home

## 2023-11-14 NOTE — ED PROVIDER NOTE - PHYSICAL EXAMINATION
Gen:  Awake, alert, NAD, WDWN, NCAT, non-toxic appearing.   Eyes:  PERRL, EOMI, no icterus, normal lids/lashes, normal conjunctivae.  ENT:  External inspection normal, pink/dry membranes.   CV:  S1S2, regular rate and rhythm, no murmur/gallops/rubs, no JVD, 2+ pulses b/l, no edema/cords/homans, warm/well-perfused.  Resp:  Normal respiratory rate/effort, no respiratory distress, normal voice, speaking full sentences, lungs clear to auscultation b/l, no wheezing/rales/rhonchi, no retractions, no stridor.  Abd:  Soft abdomen, diffuse tender, no distended/guarding/rebound, no pulsatile mass, no CVA tender.   Musculoskeletal:  N/V intact, FROM all 4 extremities, normal motor tone  Neck:  FROM neck, supple, trachea midline, no meningismus.   Skin:  Color normal for race, warm and dry, no rash.  Neuro:  Oriented x3, CN 2-12 intact (grossly), normal motor (grossly), normal sensory (grossly), +tremors, GCS 15  Psych:  Attention normal. Affect normal. Behavior normal. Judgment normal. Denies AVH.

## 2023-11-14 NOTE — H&P ADULT - NSICDXPASTMEDICALHX_GEN_ALL_CORE_FT
PAST MEDICAL HISTORY:  Alcohol abuse     History of seizure due to alcohol withdrawal     HTN (hypertension)

## 2023-11-14 NOTE — H&P ADULT - PROBLEM SELECTOR PLAN 5
CT A/P showing acute pancreatitis, dilated esophagus, and hepatic steatosis  - AST/ALT: 49/36  - Alk Phos: 133  - Monitor CMP daily

## 2023-11-15 LAB
ALBUMIN SERPL ELPH-MCNC: 2.7 G/DL — LOW (ref 3.5–5)
ALBUMIN SERPL ELPH-MCNC: 2.7 G/DL — LOW (ref 3.5–5)
ALP SERPL-CCNC: 101 U/L — SIGNIFICANT CHANGE UP (ref 40–120)
ALP SERPL-CCNC: 101 U/L — SIGNIFICANT CHANGE UP (ref 40–120)
ALT FLD-CCNC: 27 U/L DA — SIGNIFICANT CHANGE UP (ref 10–60)
ALT FLD-CCNC: 27 U/L DA — SIGNIFICANT CHANGE UP (ref 10–60)
AMPHET UR-MCNC: NEGATIVE — SIGNIFICANT CHANGE UP
AMPHET UR-MCNC: NEGATIVE — SIGNIFICANT CHANGE UP
ANION GAP SERPL CALC-SCNC: 6 MMOL/L — SIGNIFICANT CHANGE UP (ref 5–17)
ANION GAP SERPL CALC-SCNC: 6 MMOL/L — SIGNIFICANT CHANGE UP (ref 5–17)
AST SERPL-CCNC: 33 U/L — SIGNIFICANT CHANGE UP (ref 10–40)
AST SERPL-CCNC: 33 U/L — SIGNIFICANT CHANGE UP (ref 10–40)
BARBITURATES UR SCN-MCNC: NEGATIVE — SIGNIFICANT CHANGE UP
BARBITURATES UR SCN-MCNC: NEGATIVE — SIGNIFICANT CHANGE UP
BENZODIAZ UR-MCNC: NEGATIVE — SIGNIFICANT CHANGE UP
BENZODIAZ UR-MCNC: NEGATIVE — SIGNIFICANT CHANGE UP
BILIRUB SERPL-MCNC: 1.5 MG/DL — HIGH (ref 0.2–1.2)
BILIRUB SERPL-MCNC: 1.5 MG/DL — HIGH (ref 0.2–1.2)
BUN SERPL-MCNC: 7 MG/DL — SIGNIFICANT CHANGE UP (ref 7–18)
BUN SERPL-MCNC: 7 MG/DL — SIGNIFICANT CHANGE UP (ref 7–18)
CALCIUM SERPL-MCNC: 6.6 MG/DL — LOW (ref 8.4–10.5)
CALCIUM SERPL-MCNC: 6.6 MG/DL — LOW (ref 8.4–10.5)
CHLORIDE SERPL-SCNC: 104 MMOL/L — SIGNIFICANT CHANGE UP (ref 96–108)
CHLORIDE SERPL-SCNC: 104 MMOL/L — SIGNIFICANT CHANGE UP (ref 96–108)
CO2 SERPL-SCNC: 26 MMOL/L — SIGNIFICANT CHANGE UP (ref 22–31)
CO2 SERPL-SCNC: 26 MMOL/L — SIGNIFICANT CHANGE UP (ref 22–31)
COCAINE METAB.OTHER UR-MCNC: NEGATIVE — SIGNIFICANT CHANGE UP
COCAINE METAB.OTHER UR-MCNC: NEGATIVE — SIGNIFICANT CHANGE UP
CREAT SERPL-MCNC: 0.76 MG/DL — SIGNIFICANT CHANGE UP (ref 0.5–1.3)
CREAT SERPL-MCNC: 0.76 MG/DL — SIGNIFICANT CHANGE UP (ref 0.5–1.3)
CULTURE RESULTS: SIGNIFICANT CHANGE UP
CULTURE RESULTS: SIGNIFICANT CHANGE UP
EGFR: 117 ML/MIN/1.73M2 — SIGNIFICANT CHANGE UP
EGFR: 117 ML/MIN/1.73M2 — SIGNIFICANT CHANGE UP
GLUCOSE SERPL-MCNC: 103 MG/DL — HIGH (ref 70–99)
GLUCOSE SERPL-MCNC: 103 MG/DL — HIGH (ref 70–99)
HCT VFR BLD CALC: 35.8 % — LOW (ref 39–50)
HCT VFR BLD CALC: 35.8 % — LOW (ref 39–50)
HGB BLD-MCNC: 12.3 G/DL — LOW (ref 13–17)
HGB BLD-MCNC: 12.3 G/DL — LOW (ref 13–17)
MAGNESIUM SERPL-MCNC: 1.8 MG/DL — SIGNIFICANT CHANGE UP (ref 1.6–2.6)
MAGNESIUM SERPL-MCNC: 1.8 MG/DL — SIGNIFICANT CHANGE UP (ref 1.6–2.6)
MCHC RBC-ENTMCNC: 30 PG — SIGNIFICANT CHANGE UP (ref 27–34)
MCHC RBC-ENTMCNC: 30 PG — SIGNIFICANT CHANGE UP (ref 27–34)
MCHC RBC-ENTMCNC: 34.4 GM/DL — SIGNIFICANT CHANGE UP (ref 32–36)
MCHC RBC-ENTMCNC: 34.4 GM/DL — SIGNIFICANT CHANGE UP (ref 32–36)
MCV RBC AUTO: 87.3 FL — SIGNIFICANT CHANGE UP (ref 80–100)
MCV RBC AUTO: 87.3 FL — SIGNIFICANT CHANGE UP (ref 80–100)
METHADONE UR-MCNC: NEGATIVE — SIGNIFICANT CHANGE UP
METHADONE UR-MCNC: NEGATIVE — SIGNIFICANT CHANGE UP
MRSA PCR RESULT.: SIGNIFICANT CHANGE UP
MRSA PCR RESULT.: SIGNIFICANT CHANGE UP
NRBC # BLD: 0 /100 WBCS — SIGNIFICANT CHANGE UP (ref 0–0)
NRBC # BLD: 0 /100 WBCS — SIGNIFICANT CHANGE UP (ref 0–0)
OPIATES UR-MCNC: POSITIVE
OPIATES UR-MCNC: POSITIVE
PCP SPEC-MCNC: SIGNIFICANT CHANGE UP
PCP SPEC-MCNC: SIGNIFICANT CHANGE UP
PCP UR-MCNC: NEGATIVE — SIGNIFICANT CHANGE UP
PCP UR-MCNC: NEGATIVE — SIGNIFICANT CHANGE UP
PHOSPHATE SERPL-MCNC: 1.9 MG/DL — LOW (ref 2.5–4.5)
PHOSPHATE SERPL-MCNC: 1.9 MG/DL — LOW (ref 2.5–4.5)
PLATELET # BLD AUTO: 103 K/UL — LOW (ref 150–400)
PLATELET # BLD AUTO: 103 K/UL — LOW (ref 150–400)
POTASSIUM SERPL-MCNC: 2.9 MMOL/L — CRITICAL LOW (ref 3.5–5.3)
POTASSIUM SERPL-MCNC: 2.9 MMOL/L — CRITICAL LOW (ref 3.5–5.3)
POTASSIUM SERPL-MCNC: 3.1 MMOL/L — LOW (ref 3.5–5.3)
POTASSIUM SERPL-MCNC: 3.1 MMOL/L — LOW (ref 3.5–5.3)
POTASSIUM SERPL-SCNC: 2.9 MMOL/L — CRITICAL LOW (ref 3.5–5.3)
POTASSIUM SERPL-SCNC: 2.9 MMOL/L — CRITICAL LOW (ref 3.5–5.3)
POTASSIUM SERPL-SCNC: 3.1 MMOL/L — LOW (ref 3.5–5.3)
POTASSIUM SERPL-SCNC: 3.1 MMOL/L — LOW (ref 3.5–5.3)
PROT SERPL-MCNC: 6.2 G/DL — SIGNIFICANT CHANGE UP (ref 6–8.3)
PROT SERPL-MCNC: 6.2 G/DL — SIGNIFICANT CHANGE UP (ref 6–8.3)
RBC # BLD: 4.1 M/UL — LOW (ref 4.2–5.8)
RBC # BLD: 4.1 M/UL — LOW (ref 4.2–5.8)
RBC # FLD: 13.8 % — SIGNIFICANT CHANGE UP (ref 10.3–14.5)
RBC # FLD: 13.8 % — SIGNIFICANT CHANGE UP (ref 10.3–14.5)
S AUREUS DNA NOSE QL NAA+PROBE: SIGNIFICANT CHANGE UP
S AUREUS DNA NOSE QL NAA+PROBE: SIGNIFICANT CHANGE UP
SODIUM SERPL-SCNC: 136 MMOL/L — SIGNIFICANT CHANGE UP (ref 135–145)
SODIUM SERPL-SCNC: 136 MMOL/L — SIGNIFICANT CHANGE UP (ref 135–145)
SPECIMEN SOURCE: SIGNIFICANT CHANGE UP
SPECIMEN SOURCE: SIGNIFICANT CHANGE UP
THC UR QL: NEGATIVE — SIGNIFICANT CHANGE UP
THC UR QL: NEGATIVE — SIGNIFICANT CHANGE UP
TROPONIN I, HIGH SENSITIVITY RESULT: 8.8 NG/L — SIGNIFICANT CHANGE UP
TROPONIN I, HIGH SENSITIVITY RESULT: 8.8 NG/L — SIGNIFICANT CHANGE UP
TROPONIN I, HIGH SENSITIVITY RESULT: 9.6 NG/L — SIGNIFICANT CHANGE UP
TROPONIN I, HIGH SENSITIVITY RESULT: 9.6 NG/L — SIGNIFICANT CHANGE UP
WBC # BLD: 7.51 K/UL — SIGNIFICANT CHANGE UP (ref 3.8–10.5)
WBC # BLD: 7.51 K/UL — SIGNIFICANT CHANGE UP (ref 3.8–10.5)
WBC # FLD AUTO: 7.51 K/UL — SIGNIFICANT CHANGE UP (ref 3.8–10.5)
WBC # FLD AUTO: 7.51 K/UL — SIGNIFICANT CHANGE UP (ref 3.8–10.5)

## 2023-11-15 RX ORDER — POTASSIUM CHLORIDE 20 MEQ
10 PACKET (EA) ORAL
Refills: 0 | Status: COMPLETED | OUTPATIENT
Start: 2023-11-15 | End: 2023-11-15

## 2023-11-15 RX ORDER — MAGNESIUM SULFATE 500 MG/ML
1 VIAL (ML) INJECTION ONCE
Refills: 0 | Status: COMPLETED | OUTPATIENT
Start: 2023-11-15 | End: 2023-11-15

## 2023-11-15 RX ORDER — POTASSIUM PHOSPHATE, MONOBASIC POTASSIUM PHOSPHATE, DIBASIC 236; 224 MG/ML; MG/ML
15 INJECTION, SOLUTION INTRAVENOUS ONCE
Refills: 0 | Status: COMPLETED | OUTPATIENT
Start: 2023-11-15 | End: 2023-11-15

## 2023-11-15 RX ORDER — INFLUENZA VIRUS VACCINE 15; 15; 15; 15 UG/.5ML; UG/.5ML; UG/.5ML; UG/.5ML
0.5 SUSPENSION INTRAMUSCULAR ONCE
Refills: 0 | Status: DISCONTINUED | OUTPATIENT
Start: 2023-11-15 | End: 2023-11-19

## 2023-11-15 RX ORDER — SODIUM CHLORIDE 9 MG/ML
1000 INJECTION, SOLUTION INTRAVENOUS
Refills: 0 | Status: DISCONTINUED | OUTPATIENT
Start: 2023-11-15 | End: 2023-11-16

## 2023-11-15 RX ADMIN — Medication 2 MILLIGRAM(S): at 09:57

## 2023-11-15 RX ADMIN — POTASSIUM PHOSPHATE, MONOBASIC POTASSIUM PHOSPHATE, DIBASIC 62.5 MILLIMOLE(S): 236; 224 INJECTION, SOLUTION INTRAVENOUS at 15:33

## 2023-11-15 RX ADMIN — Medication 100 MILLIEQUIVALENT(S): at 01:45

## 2023-11-15 RX ADMIN — Medication 1 MILLIGRAM(S): at 12:56

## 2023-11-15 RX ADMIN — Medication 100 MILLIEQUIVALENT(S): at 19:42

## 2023-11-15 RX ADMIN — MORPHINE SULFATE 2 MILLIGRAM(S): 50 CAPSULE, EXTENDED RELEASE ORAL at 20:40

## 2023-11-15 RX ADMIN — Medication 50 MILLIGRAM(S): at 22:45

## 2023-11-15 RX ADMIN — Medication 105 MILLIGRAM(S): at 06:36

## 2023-11-15 RX ADMIN — Medication 100 MILLIEQUIVALENT(S): at 02:37

## 2023-11-15 RX ADMIN — Medication 100 MILLIEQUIVALENT(S): at 20:54

## 2023-11-15 RX ADMIN — Medication 30 MILLILITER(S): at 00:40

## 2023-11-15 RX ADMIN — Medication 2 MILLIGRAM(S): at 22:45

## 2023-11-15 RX ADMIN — Medication 105 MILLIGRAM(S): at 00:15

## 2023-11-15 RX ADMIN — Medication 105 MILLIGRAM(S): at 22:45

## 2023-11-15 RX ADMIN — ENOXAPARIN SODIUM 40 MILLIGRAM(S): 100 INJECTION SUBCUTANEOUS at 19:00

## 2023-11-15 RX ADMIN — Medication 105 MILLIGRAM(S): at 14:33

## 2023-11-15 RX ADMIN — HYDROMORPHONE HYDROCHLORIDE 1 MILLIGRAM(S): 2 INJECTION INTRAMUSCULAR; INTRAVENOUS; SUBCUTANEOUS at 06:38

## 2023-11-15 RX ADMIN — Medication 100 MILLIEQUIVALENT(S): at 10:14

## 2023-11-15 RX ADMIN — Medication 50 MILLIGRAM(S): at 12:55

## 2023-11-15 RX ADMIN — Medication 50 MILLIGRAM(S): at 06:36

## 2023-11-15 RX ADMIN — Medication 100 MILLIEQUIVALENT(S): at 12:10

## 2023-11-15 RX ADMIN — Medication 50 MILLIGRAM(S): at 00:39

## 2023-11-15 RX ADMIN — PANTOPRAZOLE SODIUM 40 MILLIGRAM(S): 20 TABLET, DELAYED RELEASE ORAL at 12:56

## 2023-11-15 RX ADMIN — Medication 2 MILLIGRAM(S): at 16:16

## 2023-11-15 RX ADMIN — Medication 100 MILLIEQUIVALENT(S): at 13:59

## 2023-11-15 RX ADMIN — SODIUM CHLORIDE 100 MILLILITER(S): 9 INJECTION, SOLUTION INTRAVENOUS at 20:54

## 2023-11-15 RX ADMIN — Medication 100 MILLIEQUIVALENT(S): at 04:38

## 2023-11-15 RX ADMIN — Medication 2 MILLIGRAM(S): at 06:37

## 2023-11-15 RX ADMIN — Medication 100 MILLIEQUIVALENT(S): at 22:20

## 2023-11-15 RX ADMIN — MORPHINE SULFATE 2 MILLIGRAM(S): 50 CAPSULE, EXTENDED RELEASE ORAL at 20:04

## 2023-11-15 RX ADMIN — Medication 1 TABLET(S): at 12:55

## 2023-11-15 RX ADMIN — Medication 100 GRAM(S): at 08:52

## 2023-11-15 RX ADMIN — Medication 2 MILLIGRAM(S): at 01:20

## 2023-11-15 RX ADMIN — HYDROMORPHONE HYDROCHLORIDE 1 MILLIGRAM(S): 2 INJECTION INTRAMUSCULAR; INTRAVENOUS; SUBCUTANEOUS at 07:38

## 2023-11-15 RX ADMIN — Medication 2 MILLIGRAM(S): at 03:34

## 2023-11-15 NOTE — PROGRESS NOTE ADULT - ASSESSMENT
Patient is a 38 year old male from home, ambulates independently, with PMH of HTN, alcohol use disorder with history of seizures 2/2 alcohol withdrawal earlier this year who presented to the ED with abdominal pain. In the ED patient had elevated BAL and elevated CIWA score.  Patient with CT A/P showing acute pancreatitis, dilated esophagus, and hepatic steatosis.  Patient is being admitted for acute pancreatitis and alcohol withdrawal.

## 2023-11-15 NOTE — PROGRESS NOTE ADULT - SUBJECTIVE AND OBJECTIVE BOX
HPI:  Patient is a 38 year old male from home, ambulates independently, with PMH of HTN, alcohol use disorder with history of seizures 2/2 alcohol withdrawal earlier this year who presented to the ED with abdominal pain.  History difficult to obtain due to patient vomiting.  patient states starting 4 days ago he had significant abdominal pain, nausea and vomiting.  patient states he has had similar episodes to this in the past and was told he had pancreatitis.  Patient has previoulsy been hospitalized for this in the past, most recently in [facility] earlier this year.  Patient states he is a heavy drinker and drinks 14 cans of beers daily.  patient states his last drink was this AM.  Patient states he has previoulsy been hospitalized for alcohol withdrawal and seizures 2/2 alcohol withdrawal however has never been intubated.  Patient states 7 months ago he was admitted to an ICU for seizures 2/2 alcohol withdrawal.  patient states the pain does not radiate to the back currently.  Patient states the pain is diffuse constant abdominal pain, worst in the epigastrium and after eating.  Patient denies nausea, vomiting, headache, blurry vision, dizziness, chest pain, abdominal pain, constipation, diarrhea, leg swelling.     Patient states he Has previously enrolled in detox.  Patient denies taking any medications at home (14 Nov 2023 19:34)      OVERNIGHT EVENTS:    No new overnight events.  Seen and examined at bedside.     REVIEW OF SYSTEMS:      CONSTITUTIONAL: No fever  EYES: no acute visual disturbances  NECK: No pain or stiffness  RESPIRATORY: No cough; No shortness of breath  CARDIOVASCULAR: No chest pain, no palpitations  GASTROINTESTINAL: No pain. No nausea, vomiting or diarrhea   NEUROLOGICAL: No headache or numbness, no tremors  MUSCULOSKELETAL: No joint pain, no muscle pain  GENITOURINARY: no dysuria, no frequency, no hesitancy  PSYCHIATRY: no depression, no anxiety  ALL OTHER  ROS negative        Vital Signs Last 24 Hrs  T(C): 37.2 (15 Nov 2023 12:53), Max: 37.6 (15 Nov 2023 09:51)  T(F): 99 (15 Nov 2023 12:53), Max: 99.7 (15 Nov 2023 09:51)  HR: 129 (15 Nov 2023 12:53) (118 - 139)  BP: 143/89 (15 Nov 2023 12:53) (126/78 - 149/74)  BP(mean): 96 (14 Nov 2023 20:00) (96 - 96)  RR: 20 (15 Nov 2023 12:53) (19 - 22)  SpO2: 97% (15 Nov 2023 12:53) (97% - 99%)    Parameters below as of 15 Nov 2023 09:51  Patient On (Oxygen Delivery Method): room air        ________________________________________________  PHYSICAL EXAM:    GENERAL: NAD  HEENT: Normocephalic; conjunctivae and sclerae clear;  NECK : supple, no JVD  CHEST/LUNG: Clear to auscultation; Nonlabored  HEART: S1 S2  regular  ABDOMEN: Soft, Nontender, Nondistended; Bowel sounds present  EXTREMITIES: no cyanosis; no LE edema; no calf tenderness  NERVOUS SYSTEM:  Alert; no new deficits  SKIN: warm and dry; No new rashes or lesions    _________________________________________________  CURRENT MEDICATIONS:    MEDICATIONS  (STANDING):  chlordiazePOXIDE 50 milliGRAM(s) Oral every 8 hours  chlordiazePOXIDE   Oral   enoxaparin Injectable 40 milliGRAM(s) SubCutaneous every 24 hours  folic acid 1 milliGRAM(s) Oral daily  influenza   Vaccine 0.5 milliLiter(s) IntraMuscular once  iohexol 300 mG (iodine)/mL Oral Solution 30 milliLiter(s) Oral once  lactated ringers. 1000 milliLiter(s) (150 mL/Hr) IV Continuous <Continuous>  multivitamin/minerals 1 Tablet(s) Oral daily  pantoprazole  Injectable 40 milliGRAM(s) IV Push daily  potassium chloride  10 mEq/100 mL IVPB 10 milliEquivalent(s) IV Intermittent every 1 hour  potassium phosphate IVPB 15 milliMole(s) IV Intermittent once  thiamine IVPB 500 milliGRAM(s) IV Intermittent three times a day    MEDICATIONS  (PRN):  aluminum hydroxide/magnesium hydroxide/simethicone Suspension 30 milliLiter(s) Oral every 4 hours PRN Dyspepsia  HYDROmorphone  Injectable 1 milliGRAM(s) IV Push every 6 hours PRN Severe Pain (7 - 10)  LORazepam   Injectable 2 milliGRAM(s) IV Push every 2 hours PRN CIWA-Ar score increase by 2 points and a total score of 7 or less  morphine  - Injectable 2 milliGRAM(s) IV Push every 6 hours PRN Moderate Pain (4 - 6)  ondansetron Injectable 4 milliGRAM(s) IV Push every 8 hours PRN Nausea and/or Vomiting      __________________________________________________  LABS:                          12.3   7.51  )-----------( 103      ( 15 Nov 2023 06:50 )             35.8     11-15    136  |  104  |  7   ----------------------------<  103<H>  2.9<LL>   |  26  |  0.76    Ca    6.6<L>      15 Nov 2023 06:50  Phos  1.9     11-15  Mg     1.8     11-15    TPro  6.2  /  Alb  2.7<L>  /  TBili  1.5<H>  /  DBili  x   /  AST  33  /  ALT  27  /  AlkPhos  101  11-15      Urinalysis Basic - ( 15 Nov 2023 06:50 )    Color: x / Appearance: x / SG: x / pH: x  Gluc: 103 mg/dL / Ketone: x  / Bili: x / Urobili: x   Blood: x / Protein: x / Nitrite: x   Leuk Esterase: x / RBC: x / WBC x   Sq Epi: x / Non Sq Epi: x / Bacteria: x      CAPILLARY BLOOD GLUCOSE          __________________________________________________  RADIOLOGY & ADDITIONAL TESTS:    Imaging Personally Reviewed:  YES    < from: CT Abdomen and Pelvis w/ IV Cont (11.14.23 @ 17:56) >  IMPRESSION:    Mild inflammatory changes in the fat adjacent to the pancreas may   represent acute pancreatitis. The pancreas enhances normally. There is no   ductal dilatation. The surrounding vessels are unremarkable. Correlate   clinically.    Mild thickening of the distal esophagus. Recommend nonemergent endoscopy.    Hepatic steatosis    < end of copied text >    Consultant(s) Notes Reviewed:   YES     Plan of care was discussed with patient and /or primary care giver; all questions and concerns were addressed and care was aligned with patient's wishes.    Plan discussed with attending and consulting physicians.

## 2023-11-15 NOTE — PROGRESS NOTE ADULT - SUBJECTIVE AND OBJECTIVE BOX
HPI:  Patient is a 38 year old male from home, ambulates independently, with PMH of HTN, alcohol use disorder with history of seizures 2/2 alcohol withdrawal earlier this year who presented to the ED with abdominal pain.  History difficult to obtain due to patient vomiting.  patient states starting 4 days ago he had significant abdominal pain, nausea and vomiting.  patient states he has had similar episodes to this in the past and was told he had pancreatitis.  Patient has previoulsy been hospitalized for this in the past, most recently in [facility] earlier this year.  Patient states he is a heavy drinker and drinks 14 cans of beers daily.  patient states his last drink was this AM.  Patient states he has previoulsy been hospitalized for alcohol withdrawal and seizures 2/2 alcohol withdrawal however has never been intubated.  Patient states 7 months ago he was admitted to an ICU for seizures 2/2 alcohol withdrawal.  patient states the pain does not radiate to the back currently.  Patient states the pain is diffuse constant abdominal pain, worst in the epigastrium and after eating.  Patient denies nausea, vomiting, headache, blurry vision, dizziness, chest pain, abdominal pain, constipation, diarrhea, leg swelling.     Patient states he Has previously enrolled in detox.  Patient denies taking any medications at home (14 Nov 2023 19:34)      Patient is a 38y old  Male who presents with a chief complaint of Acute pancreatitis and alcohol withdrawal (15 Nov 2023 13:31)      INTERVAL HPI/OVERNIGHT EVENTS:  T(C): 37.3 (11-15-23 @ 16:10), Max: 37.6 (11-15-23 @ 09:51)  HR: 126 (11-15-23 @ 16:10) (118 - 139)  BP: 120/71 (11-15-23 @ 16:10) (120/71 - 149/74)  RR: 20 (11-15-23 @ 16:10) (19 - 22)  SpO2: 100% (11-15-23 @ 16:10) (97% - 100%)  Wt(kg): --  I&O's Summary      REVIEW OF SYSTEMS: denies fever, chills, SOB, palpitations, chest pain, abdominal pain, nausea, vomitting, diarrhea, constipation, dizziness    MEDICATIONS  (STANDING):  chlordiazePOXIDE   Oral   chlordiazePOXIDE 50 milliGRAM(s) Oral every 8 hours  enoxaparin Injectable 40 milliGRAM(s) SubCutaneous every 24 hours  folic acid 1 milliGRAM(s) Oral daily  influenza   Vaccine 0.5 milliLiter(s) IntraMuscular once  iohexol 300 mG (iodine)/mL Oral Solution 30 milliLiter(s) Oral once  lactated ringers. 1000 milliLiter(s) (150 mL/Hr) IV Continuous <Continuous>  multivitamin/minerals 1 Tablet(s) Oral daily  pantoprazole  Injectable 40 milliGRAM(s) IV Push daily  potassium chloride  10 mEq/100 mL IVPB 10 milliEquivalent(s) IV Intermittent every 1 hour  thiamine IVPB 500 milliGRAM(s) IV Intermittent three times a day    MEDICATIONS  (PRN):  aluminum hydroxide/magnesium hydroxide/simethicone Suspension 30 milliLiter(s) Oral every 4 hours PRN Dyspepsia  HYDROmorphone  Injectable 1 milliGRAM(s) IV Push every 6 hours PRN Severe Pain (7 - 10)  LORazepam   Injectable 2 milliGRAM(s) IV Push every 2 hours PRN CIWA-Ar score increase by 2 points and a total score of 7 or less  morphine  - Injectable 2 milliGRAM(s) IV Push every 6 hours PRN Moderate Pain (4 - 6)  ondansetron Injectable 4 milliGRAM(s) IV Push every 8 hours PRN Nausea and/or Vomiting      PHYSICAL EXAM:  GENERAL: NAD, well-groomed, well-developed  HEAD:  Atraumatic, Normocephalic  EYES: EOMI, PERRLA, conjunctiva and sclera clear  ENMT: No tonsillar erythema, exudates, or enlargement; Moist mucous membranes, Good dentition, No lesions  NECK: Supple, No JVD, Normal thyroid  NERVOUS SYSTEM:  Alert & Oriented X3, Good concentration; Motor Strength 5/5 B/L upper and lower extremities; DTRs 2+ intact and symmetric  CHEST/LUNG: Clear to percussion bilaterally; No rales, rhonchi, wheezing, or rubs  HEART: Regular rate and rhythm; No murmurs, rubs, or gallops  ABDOMEN: Soft, Nontender, Nondistended; Bowel sounds present  EXTREMITIES:  2+ Peripheral Pulses, No clubbing, cyanosis, or edema  LYMPH: No lymphadenopathy noted  SKIN: No rashes or lesions  LABS:                        12.3   7.51  )-----------( 103      ( 15 Nov 2023 06:50 )             35.8     11-15    x   |  x   |  x   ----------------------------<  x   3.1<L>   |  x   |  x     Ca    6.6<L>      15 Nov 2023 06:50  Phos  1.9     11-15  Mg     1.8     11-15    TPro  6.2  /  Alb  2.7<L>  /  TBili  1.5<H>  /  DBili  x   /  AST  33  /  ALT  27  /  AlkPhos  101  11-15      Urinalysis Basic - ( 15 Nov 2023 06:50 )    Color: x / Appearance: x / SG: x / pH: x  Gluc: 103 mg/dL / Ketone: x  / Bili: x / Urobili: x   Blood: x / Protein: x / Nitrite: x   Leuk Esterase: x / RBC: x / WBC x   Sq Epi: x / Non Sq Epi: x / Bacteria: x      CAPILLARY BLOOD GLUCOSE            Urinalysis Basic - ( 15 Nov 2023 06:50 )    Color: x / Appearance: x / SG: x / pH: x  Gluc: 103 mg/dL / Ketone: x  / Bili: x / Urobili: x   Blood: x / Protein: x / Nitrite: x   Leuk Esterase: x / RBC: x / WBC x   Sq Epi: x / Non Sq Epi: x / Bacteria: x

## 2023-11-15 NOTE — PATIENT PROFILE ADULT - FALL HARM RISK - HARM RISK INTERVENTIONS
Assistance with ambulation/Assistance OOB with selected safe patient handling equipment/Communicate Risk of Fall with Harm to all staff/Monitor for mental status changes/Monitor gait and stability/Reinforce activity limits and safety measures with patient and family/Tailored Fall Risk Interventions/Toileting schedule using arm’s reach rule for commode and bathroom/Use of alarms - bed, chair and/or voice tab/Visual Cue: Yellow wristband and red socks/Bed in lowest position, wheels locked, appropriate side rails in place/Call bell, personal items and telephone in reach/Instruct patient to call for assistance before getting out of bed or chair/Non-slip footwear when patient is out of bed/[place] to call system/Physically safe environment - no spills, clutter or unnecessary equipment/Purposeful Proactive Rounding/Room/bathroom lighting operational, light cord in reach

## 2023-11-15 NOTE — PROGRESS NOTE ADULT - ASSESSMENT
seen and examiend vsstable except tachycardia   little lithsrgic  on ativan    as per pt abd pain better   no nausea or vomiting   cns tremers   labs noted  k 3.1    a/p etoh abuse  pancreatitis   GI  watch

## 2023-11-16 LAB
ALBUMIN SERPL ELPH-MCNC: 2.7 G/DL — LOW (ref 3.5–5)
ALBUMIN SERPL ELPH-MCNC: 2.7 G/DL — LOW (ref 3.5–5)
ALP SERPL-CCNC: 106 U/L — SIGNIFICANT CHANGE UP (ref 40–120)
ALP SERPL-CCNC: 106 U/L — SIGNIFICANT CHANGE UP (ref 40–120)
ALT FLD-CCNC: 26 U/L DA — SIGNIFICANT CHANGE UP (ref 10–60)
ALT FLD-CCNC: 26 U/L DA — SIGNIFICANT CHANGE UP (ref 10–60)
ANION GAP SERPL CALC-SCNC: 8 MMOL/L — SIGNIFICANT CHANGE UP (ref 5–17)
ANION GAP SERPL CALC-SCNC: 8 MMOL/L — SIGNIFICANT CHANGE UP (ref 5–17)
AST SERPL-CCNC: 36 U/L — SIGNIFICANT CHANGE UP (ref 10–40)
AST SERPL-CCNC: 36 U/L — SIGNIFICANT CHANGE UP (ref 10–40)
BILIRUB SERPL-MCNC: 1.3 MG/DL — HIGH (ref 0.2–1.2)
BILIRUB SERPL-MCNC: 1.3 MG/DL — HIGH (ref 0.2–1.2)
BUN SERPL-MCNC: 5 MG/DL — LOW (ref 7–18)
BUN SERPL-MCNC: 5 MG/DL — LOW (ref 7–18)
CALCIUM SERPL-MCNC: 6.6 MG/DL — LOW (ref 8.4–10.5)
CALCIUM SERPL-MCNC: 6.6 MG/DL — LOW (ref 8.4–10.5)
CHLORIDE SERPL-SCNC: 101 MMOL/L — SIGNIFICANT CHANGE UP (ref 96–108)
CHLORIDE SERPL-SCNC: 101 MMOL/L — SIGNIFICANT CHANGE UP (ref 96–108)
CO2 SERPL-SCNC: 24 MMOL/L — SIGNIFICANT CHANGE UP (ref 22–31)
CO2 SERPL-SCNC: 24 MMOL/L — SIGNIFICANT CHANGE UP (ref 22–31)
CREAT SERPL-MCNC: 0.68 MG/DL — SIGNIFICANT CHANGE UP (ref 0.5–1.3)
CREAT SERPL-MCNC: 0.68 MG/DL — SIGNIFICANT CHANGE UP (ref 0.5–1.3)
EGFR: 121 ML/MIN/1.73M2 — SIGNIFICANT CHANGE UP
EGFR: 121 ML/MIN/1.73M2 — SIGNIFICANT CHANGE UP
GLUCOSE SERPL-MCNC: 106 MG/DL — HIGH (ref 70–99)
GLUCOSE SERPL-MCNC: 106 MG/DL — HIGH (ref 70–99)
HCT VFR BLD CALC: 39.8 % — SIGNIFICANT CHANGE UP (ref 39–50)
HCT VFR BLD CALC: 39.8 % — SIGNIFICANT CHANGE UP (ref 39–50)
HGB BLD-MCNC: 13.7 G/DL — SIGNIFICANT CHANGE UP (ref 13–17)
HGB BLD-MCNC: 13.7 G/DL — SIGNIFICANT CHANGE UP (ref 13–17)
INR BLD: 1.25 RATIO — HIGH (ref 0.85–1.18)
INR BLD: 1.25 RATIO — HIGH (ref 0.85–1.18)
MAGNESIUM SERPL-MCNC: 2.6 MG/DL — SIGNIFICANT CHANGE UP (ref 1.6–2.6)
MAGNESIUM SERPL-MCNC: 2.6 MG/DL — SIGNIFICANT CHANGE UP (ref 1.6–2.6)
MCHC RBC-ENTMCNC: 30.4 PG — SIGNIFICANT CHANGE UP (ref 27–34)
MCHC RBC-ENTMCNC: 30.4 PG — SIGNIFICANT CHANGE UP (ref 27–34)
MCHC RBC-ENTMCNC: 34.4 GM/DL — SIGNIFICANT CHANGE UP (ref 32–36)
MCHC RBC-ENTMCNC: 34.4 GM/DL — SIGNIFICANT CHANGE UP (ref 32–36)
MCV RBC AUTO: 88.4 FL — SIGNIFICANT CHANGE UP (ref 80–100)
MCV RBC AUTO: 88.4 FL — SIGNIFICANT CHANGE UP (ref 80–100)
MELD SCORE WITH DIALYSIS: 25 POINTS — SIGNIFICANT CHANGE UP
MELD SCORE WITH DIALYSIS: 25 POINTS — SIGNIFICANT CHANGE UP
MELD SCORE WITHOUT DIALYSIS: 10 POINTS — SIGNIFICANT CHANGE UP
MELD SCORE WITHOUT DIALYSIS: 10 POINTS — SIGNIFICANT CHANGE UP
NRBC # BLD: 0 /100 WBCS — SIGNIFICANT CHANGE UP (ref 0–0)
NRBC # BLD: 0 /100 WBCS — SIGNIFICANT CHANGE UP (ref 0–0)
PHOSPHATE SERPL-MCNC: 2 MG/DL — LOW (ref 2.5–4.5)
PHOSPHATE SERPL-MCNC: 2 MG/DL — LOW (ref 2.5–4.5)
PLATELET # BLD AUTO: 97 K/UL — LOW (ref 150–400)
PLATELET # BLD AUTO: 97 K/UL — LOW (ref 150–400)
POTASSIUM SERPL-MCNC: 2.9 MMOL/L — CRITICAL LOW (ref 3.5–5.3)
POTASSIUM SERPL-MCNC: 2.9 MMOL/L — CRITICAL LOW (ref 3.5–5.3)
POTASSIUM SERPL-SCNC: 2.9 MMOL/L — CRITICAL LOW (ref 3.5–5.3)
POTASSIUM SERPL-SCNC: 2.9 MMOL/L — CRITICAL LOW (ref 3.5–5.3)
PROT SERPL-MCNC: 6.8 G/DL — SIGNIFICANT CHANGE UP (ref 6–8.3)
PROT SERPL-MCNC: 6.8 G/DL — SIGNIFICANT CHANGE UP (ref 6–8.3)
PROTHROM AB SERPL-ACNC: 14.2 SEC — HIGH (ref 9.5–13)
PROTHROM AB SERPL-ACNC: 14.2 SEC — HIGH (ref 9.5–13)
RBC # BLD: 4.5 M/UL — SIGNIFICANT CHANGE UP (ref 4.2–5.8)
RBC # BLD: 4.5 M/UL — SIGNIFICANT CHANGE UP (ref 4.2–5.8)
RBC # FLD: 13.2 % — SIGNIFICANT CHANGE UP (ref 10.3–14.5)
RBC # FLD: 13.2 % — SIGNIFICANT CHANGE UP (ref 10.3–14.5)
SODIUM SERPL-SCNC: 133 MMOL/L — LOW (ref 135–145)
SODIUM SERPL-SCNC: 133 MMOL/L — LOW (ref 135–145)
WBC # BLD: 4.79 K/UL — SIGNIFICANT CHANGE UP (ref 3.8–10.5)
WBC # BLD: 4.79 K/UL — SIGNIFICANT CHANGE UP (ref 3.8–10.5)
WBC # FLD AUTO: 4.79 K/UL — SIGNIFICANT CHANGE UP (ref 3.8–10.5)
WBC # FLD AUTO: 4.79 K/UL — SIGNIFICANT CHANGE UP (ref 3.8–10.5)

## 2023-11-16 PROCEDURE — 99233 SBSQ HOSP IP/OBS HIGH 50: CPT

## 2023-11-16 RX ORDER — PHENOBARBITAL 60 MG
130 TABLET ORAL ONCE
Refills: 0 | Status: DISCONTINUED | OUTPATIENT
Start: 2023-11-16 | End: 2023-11-16

## 2023-11-16 RX ORDER — SODIUM CHLORIDE 9 MG/ML
1000 INJECTION, SOLUTION INTRAVENOUS
Refills: 0 | Status: DISCONTINUED | OUTPATIENT
Start: 2023-11-16 | End: 2023-11-16

## 2023-11-16 RX ORDER — POTASSIUM PHOSPHATE, MONOBASIC POTASSIUM PHOSPHATE, DIBASIC 236; 224 MG/ML; MG/ML
15 INJECTION, SOLUTION INTRAVENOUS ONCE
Refills: 0 | Status: DISCONTINUED | OUTPATIENT
Start: 2023-11-16 | End: 2023-11-16

## 2023-11-16 RX ORDER — THIAMINE MONONITRATE (VIT B1) 100 MG
500 TABLET ORAL EVERY 8 HOURS
Refills: 0 | Status: DISCONTINUED | OUTPATIENT
Start: 2023-11-16 | End: 2023-11-19

## 2023-11-16 RX ORDER — POTASSIUM CHLORIDE 20 MEQ
10 PACKET (EA) ORAL
Refills: 0 | Status: COMPLETED | OUTPATIENT
Start: 2023-11-16 | End: 2023-11-16

## 2023-11-16 RX ORDER — PHENOBARBITAL 60 MG
130 TABLET ORAL
Refills: 0 | Status: DISCONTINUED | OUTPATIENT
Start: 2023-11-16 | End: 2023-11-16

## 2023-11-16 RX ORDER — POTASSIUM CHLORIDE 20 MEQ
10 PACKET (EA) ORAL
Refills: 0 | Status: DISCONTINUED | OUTPATIENT
Start: 2023-11-16 | End: 2023-11-16

## 2023-11-16 RX ORDER — SODIUM CHLORIDE 9 MG/ML
1000 INJECTION, SOLUTION INTRAVENOUS
Refills: 0 | Status: DISCONTINUED | OUTPATIENT
Start: 2023-11-16 | End: 2023-11-17

## 2023-11-16 RX ORDER — MORPHINE SULFATE 50 MG/1
2 CAPSULE, EXTENDED RELEASE ORAL EVERY 6 HOURS
Refills: 0 | Status: DISCONTINUED | OUTPATIENT
Start: 2023-11-16 | End: 2023-11-18

## 2023-11-16 RX ORDER — POTASSIUM PHOSPHATE, MONOBASIC POTASSIUM PHOSPHATE, DIBASIC 236; 224 MG/ML; MG/ML
30 INJECTION, SOLUTION INTRAVENOUS ONCE
Refills: 0 | Status: COMPLETED | OUTPATIENT
Start: 2023-11-16 | End: 2023-11-16

## 2023-11-16 RX ADMIN — MORPHINE SULFATE 2 MILLIGRAM(S): 50 CAPSULE, EXTENDED RELEASE ORAL at 16:18

## 2023-11-16 RX ADMIN — Medication 100 MILLIEQUIVALENT(S): at 10:23

## 2023-11-16 RX ADMIN — Medication 2 MILLIGRAM(S): at 18:13

## 2023-11-16 RX ADMIN — Medication 1 MILLIGRAM(S): at 11:47

## 2023-11-16 RX ADMIN — Medication 130 MILLIGRAM(S): at 15:48

## 2023-11-16 RX ADMIN — Medication 105 MILLIGRAM(S): at 05:49

## 2023-11-16 RX ADMIN — Medication 130 MILLIGRAM(S): at 18:54

## 2023-11-16 RX ADMIN — Medication 2 MILLIGRAM(S): at 22:48

## 2023-11-16 RX ADMIN — MORPHINE SULFATE 2 MILLIGRAM(S): 50 CAPSULE, EXTENDED RELEASE ORAL at 16:30

## 2023-11-16 RX ADMIN — Medication 100 MILLIEQUIVALENT(S): at 13:01

## 2023-11-16 RX ADMIN — SODIUM CHLORIDE 100 MILLILITER(S): 9 INJECTION, SOLUTION INTRAVENOUS at 11:02

## 2023-11-16 RX ADMIN — Medication 130 MILLIGRAM(S): at 17:27

## 2023-11-16 RX ADMIN — Medication 1 MILLIGRAM(S): at 04:59

## 2023-11-16 RX ADMIN — Medication 1 MILLIGRAM(S): at 02:51

## 2023-11-16 RX ADMIN — Medication 2 MILLIGRAM(S): at 05:50

## 2023-11-16 RX ADMIN — Medication 1 MILLIGRAM(S): at 03:51

## 2023-11-16 RX ADMIN — Medication 50 MILLIGRAM(S): at 06:55

## 2023-11-16 RX ADMIN — Medication 130 MILLIGRAM(S): at 18:39

## 2023-11-16 RX ADMIN — Medication 130 MILLIGRAM(S): at 18:08

## 2023-11-16 RX ADMIN — Medication 2 MILLIGRAM(S): at 10:33

## 2023-11-16 RX ADMIN — PANTOPRAZOLE SODIUM 40 MILLIGRAM(S): 20 TABLET, DELAYED RELEASE ORAL at 13:10

## 2023-11-16 RX ADMIN — POTASSIUM PHOSPHATE, MONOBASIC POTASSIUM PHOSPHATE, DIBASIC 83.33 MILLIMOLE(S): 236; 224 INJECTION, SOLUTION INTRAVENOUS at 21:58

## 2023-11-16 RX ADMIN — Medication 2 MILLIGRAM(S): at 21:42

## 2023-11-16 RX ADMIN — Medication 105 MILLIGRAM(S): at 21:57

## 2023-11-16 RX ADMIN — Medication 100 MILLIEQUIVALENT(S): at 08:40

## 2023-11-16 RX ADMIN — Medication 2 MILLIGRAM(S): at 03:32

## 2023-11-16 RX ADMIN — Medication 2 MILLIGRAM(S): at 01:30

## 2023-11-16 RX ADMIN — Medication 4 MILLIGRAM(S): at 18:00

## 2023-11-16 RX ADMIN — Medication 2 MILLIGRAM(S): at 19:20

## 2023-11-16 RX ADMIN — HYDROMORPHONE HYDROCHLORIDE 1 MILLIGRAM(S): 2 INJECTION INTRAMUSCULAR; INTRAVENOUS; SUBCUTANEOUS at 04:20

## 2023-11-16 RX ADMIN — Medication 130 MILLIGRAM(S): at 16:18

## 2023-11-16 RX ADMIN — Medication 1 MILLIGRAM(S): at 13:54

## 2023-11-16 RX ADMIN — Medication 2 MILLIGRAM(S): at 23:58

## 2023-11-16 RX ADMIN — Medication 1 MILLIGRAM(S): at 13:38

## 2023-11-16 RX ADMIN — Medication 105 MILLIGRAM(S): at 17:28

## 2023-11-16 RX ADMIN — Medication 2 MILLIGRAM(S): at 18:59

## 2023-11-16 RX ADMIN — Medication 2 MILLIGRAM(S): at 08:12

## 2023-11-16 RX ADMIN — HYDROMORPHONE HYDROCHLORIDE 1 MILLIGRAM(S): 2 INJECTION INTRAMUSCULAR; INTRAVENOUS; SUBCUTANEOUS at 03:49

## 2023-11-16 RX ADMIN — Medication 1 TABLET(S): at 13:38

## 2023-11-16 RX ADMIN — Medication 2 MILLIGRAM(S): at 19:25

## 2023-11-16 RX ADMIN — ENOXAPARIN SODIUM 40 MILLIGRAM(S): 100 INJECTION SUBCUTANEOUS at 20:49

## 2023-11-16 NOTE — PROGRESS NOTE ADULT - PROBLEM SELECTOR PLAN 2
CIWA 6-17 today  Continue CIWA protocol  Continue Libruim leti  Continue Ativan PRN  Continue Folic Acid, Thiamine and MVI  Social work consulted
CIWA 8-11 today  Continue CIWA protocol  Continue Libruim leti  Continue Ativan PRN  Continue Folic Acid, Thiamine and MVI  Social work consulted

## 2023-11-16 NOTE — CHART NOTE - NSCHARTNOTEFT_GEN_A_CORE
EVENT: CIWA-A score 17, pt requesting to leave hospital. Wife     BRIEF HPI: 38 year old male from home, ambulates independently, with PMH of HTN, alcohol use disorder with history of seizures 2/2 alcohol withdrawal earlier this year who presented to the ED with abdominal pain. In the ED patient had elevated BAL and elevated CIWA score.  Patient with CT A/P showing acute pancreatitis, dilated esophagus, and hepatic steatosis.  Patient is being admitted for acute pancreatitis and alcohol withdrawal.     OBJECTIVE:  Vital Signs Last 24 Hrs  T(C): 36.7 (15 Nov 2023 21:12), Max: 37.6 (15 Nov 2023 09:51)  T(F): 98.1 (15 Nov 2023 21:12), Max: 99.7 (15 Nov 2023 09:51)  HR: 118 (16 Nov 2023 02:03) (112 - 131)  BP: 142/92 (16 Nov 2023 02:03) (120/71 - 149/74)  BP(mean): 102 (16 Nov 2023 02:03) (102 - 102)  RR: 20 (16 Nov 2023 02:03) (20 - 20)  SpO2: 97% (16 Nov 2023 02:03) (97% - 100%)    Parameters below as of 16 Nov 2023 02:03  Patient On (Oxygen Delivery Method): room air    FOCUSED PHYSICAL EXAM:  NEURO:  RESP:  CV:    LABS:                        12.3   7.51  )-----------( 103      ( 15 Nov 2023 06:50 )             35.8     11-15    x   |  x   |  x   ----------------------------<  x   3.1<L>   |  x   |  x     Ca    6.6<L>      15 Nov 2023 06:50  Phos  1.9     11-15  Mg     1.8     11-15    TPro  6.2  /  Alb  2.7<L>  /  TBili  1.5<H>  /  DBili  x   /  AST  33  /  ALT  27  /  AlkPhos  101  11-15      EKG:   IMGAGING:    ASSESSMENT:  HPI:  Patient is a 38 year old male from home, ambulates independently, with PMH of HTN, alcohol use disorder with history of seizures 2/2 alcohol withdrawal earlier this year who presented to the ED with abdominal pain.  History difficult to obtain due to patient vomiting.  patient states starting 4 days ago he had significant abdominal pain, nausea and vomiting.  patient states he has had similar episodes to this in the past and was told he had pancreatitis.  Patient has previoulsy been hospitalized for this in the past, most recently in [facility] earlier this year.  Patient states he is a heavy drinker and drinks 14 cans of beers daily.  patient states his last drink was this AM.  Patient states he has previoulsy been hospitalized for alcohol withdrawal and seizures 2/2 alcohol withdrawal however has never been intubated.  Patient states 7 months ago he was admitted to an ICU for seizures 2/2 alcohol withdrawal.  patient states the pain does not radiate to the back currently.  Patient states the pain is diffuse constant abdominal pain, worst in the epigastrium and after eating.  Patient denies nausea, vomiting, headache, blurry vision, dizziness, chest pain, abdominal pain, constipation, diarrhea, leg swelling.     Patient states he Has previously enrolled in detox.  Patient denies taking any medications at home (14 Nov 2023 19:34)      PLAN:   MEDICATIONS  (STANDING):  chlordiazePOXIDE   Oral   chlordiazePOXIDE 50 milliGRAM(s) Oral every 8 hours  enoxaparin Injectable 40 milliGRAM(s) SubCutaneous every 24 hours  folic acid 1 milliGRAM(s) Oral daily  influenza   Vaccine 0.5 milliLiter(s) IntraMuscular once  iohexol 300 mG (iodine)/mL Oral Solution 30 milliLiter(s) Oral once  lactated ringers. 1000 milliLiter(s) (100 mL/Hr) IV Continuous <Continuous>  multivitamin/minerals 1 Tablet(s) Oral daily  pantoprazole  Injectable 40 milliGRAM(s) IV Push daily  thiamine IVPB 500 milliGRAM(s) IV Intermittent three times a day    MEDICATIONS  (PRN):  aluminum hydroxide/magnesium hydroxide/simethicone Suspension 30 milliLiter(s) Oral every 4 hours PRN Dyspepsia  HYDROmorphone  Injectable 1 milliGRAM(s) IV Push every 6 hours PRN Severe Pain (7 - 10)  LORazepam   Injectable 2 milliGRAM(s) IV Push every 2 hours PRN CIWA-Ar score increase by 2 points and a total score of 7 or less  morphine  - Injectable 2 milliGRAM(s) IV Push every 6 hours PRN Moderate Pain (4 - 6)  ondansetron Injectable 4 milliGRAM(s) IV Push every 8 hours PRN Nausea and/or Vomiting    FOLLOW UP / RESULT: Jet Smallwood [id number]  EVENT: Called to assess pt for a CIWA-A score 17, pt requesting to leave hospital.     BRIEF HPI: 38 year old male from home, ambulates independently, with PMH of HTN, alcohol use disorder with history of seizures 2/2 alcohol withdrawal earlier this year who presented to the ED with abdominal pain. In the ED patient had elevated BAL and elevated CIWA-A score.  Patient with CT A/P showing acute pancreatitis, dilated esophagus, and hepatic steatosis.  Patient is being admitted for acute pancreatitis and alcohol withdrawal.     OBJECTIVE:  Vital Signs Last 24 Hrs  T(C): 36.7 (15 Nov 2023 21:12), Max: 37.6 (15 Nov 2023 09:51)  T(F): 98.1 (15 Nov 2023 21:12), Max: 99.7 (15 Nov 2023 09:51)  HR: 118 (16 Nov 2023 02:03) (112 - 131)  BP: 142/92 (16 Nov 2023 02:03) (120/71 - 149/74)  BP(mean): 102 (16 Nov 2023 02:03) (102 - 102)  RR: 20 (16 Nov 2023 02:03) (20 - 20)  SpO2: 97% (16 Nov 2023 02:03) (97% - 100%)    Parameters below as of 16 Nov 2023 02:03  Patient On (Oxygen Delivery Method): room air    FOCUSED PHYSICAL EXAM:  GEN: Obese male sitting at edge of bed, tremulous, asking to leave hospital  NEURO: Agitated, disoriented, maybe visual hallucination. NA reporting unsteady gait when assisted to the bedroom  RESP: Unlabored, even  CV: S1 S2 mild tach, regular    LABS:                        12.3   7.51  )-----------( 103      ( 15 Nov 2023 06:50 )             35.8     11-15    x   |  x   |  x   ----------------------------<  x   3.1<L>   |  x   |  x     Ca    6.6<L>      15 Nov 2023 06:50  Phos  1.9     11-15  Mg     1.8     11-15    TPro  6.2  /  Alb  2.7<L>  /  TBili  1.5<H>  /  BiDil  x   /  AST  33  /  ALT  27  /  Alk Phos  101  11-15    IMAGING:  ACC: [id number] EXAM:  CT ABDOMEN AND PELVIS IC   ORDERED BY:  BRIGHT MAYORGA   PROCEDURE DATE:  11/14/2023    IMPRESSION:  Mild inflammatory changes in the fat adjacent to the pancreas may represent acute pancreatitis. The pancreas enhances normally. There is no ductal dilatation. The surrounding vessels are unremarkable. Correlate clinically. Mild thickening of the distal esophagus. Recommend nonemergent endoscopy. Hepatic steatosis    PROBLEM: Agitation probably due to ETOH withdrawal  PLAN:   Cont chlordiazepoxide 50 kayla GRAM(s) Oral every 8 hours  Lorazepam   Injectable 1 kayla GRAM(s) IV Push every 1 hour PRN CIWA-A score 8 or greater added  Cont Lorazepam   Injectable 2 kayla GRAM(s) IV Push every 2 hours PRN CIWA-A score increase by 2 points and a total score of 7 or less    FOLLOW UP / RESULT: effect of medication    Wife Jessica Mendez  called and updated on pts condition.

## 2023-11-16 NOTE — CONSULT NOTE ADULT - ASSESSMENT
38 year old male  with PMH of HTN, alcohol use disorder with history of seizures 2/2 alcohol withdrawal earlier this year who presented to the ED with abdominal pain. Patient was admitted for alcohol withdrawal and acute pancreatitis Was started on Librium taper and required 15mg of ativan, with current CIWA 10. Patient will be admitted to ICU for further management.     =================== Neuro============================  # Alcohol Withdrawal  hx of seizures w/w alochol withdrawal  drink 12 cans of beer daily  Was started on Librium taper   Required 14 units of ativan  Will start Phenobarbital 130mg  C/W thiamine          ================= Cardiovascular==========================  # No issues      ================- Pulm=================================  # No issues     ==================ID===================================  # No issues     ================= Nephro================================  # Hypokalemia  K 2.9  s/p 3 rider + K in LR  f/u rpt BMP  replete electrolytes Mg>2, Phos >3 K>4    =================GI====================================  # Acute Pancreatitis  p/w abdominal pain, nausea, and vomiting  Lipase 620  CT A.P : Mild inflammatory changes in the fat adjacent to the pancreas may   represent acute pancreatitis  C/W LR 100cc/hr for 24 hrs     =============== Heme==================================  # Thrombocytopenia  Plts 138>97  likely 2/2 alcohol abuse  no signs of bleeding   continue to trend     =================Endocrine===============================  # No issues     ================= Skin/Catheters============================  Peripheral IV lines       =================Prophylaxis =============================  DVT prophylaxis Lovenox   GI prophylaxis     ==================GOC==================================  FULL CODE   Disposition : ICU    
1. Abdominal pain  2. Alcoholic pancreatitis  3. DT's  4. ETOH abuse  5. Thrombocytopenia (ETOH induced)  6. Steatohepatitis  7. Thickened wall of distal esophagus  8. Esophagitis    Suggestions:    1. NPO  2. IVF hydration  3. Protonix daily  4. Avoid NSAID  5. DT's precaution  6. Thiamine / Folate / MVI  7. Monitor electrolytes  8. Monitor platelet count  9. EGD out patient  10. DVT prophylaxis

## 2023-11-16 NOTE — CONSULT NOTE ADULT - ATTENDING COMMENTS
39yo man w/ PMH HTN, EtOH use disorder w/ h/o EtOH-withdrawal seizures and ICU admission for the same who originally presented 10/14 with abd pain and admitted to medicine floor for EtOH and suspected EtOH pancreatitis. EtOH level at time of admission was notably 351. Started on CIWA protocol w/ standing librium 50 q8h with PRN ativan. ICU consulted for persistently high CIWA and PRN requirements of ativan 14mg total since night prior.     #Severe EtOH withdrawal with hallucinations  #Alcoholic pancreatitis  #H/o HTN  #Thrombocytopenia - likely 2/2 chronic EtOH  #Hypokalemia    Recommendations:  - transfer to ICU  - start phenobarb protocol  - CIWA monitoring  - hemodynamic monitoring  - seizure precautions given hx  - IVF hydration  - thiamine, folate, B12  - trend BMP/lytes and replete  - NPO for now  - DVT PPx

## 2023-11-16 NOTE — PROGRESS NOTE ADULT - SUBJECTIVE AND OBJECTIVE BOX
HPI:  Patient is a 38 year old male from home, ambulates independently, with PMH of HTN, alcohol use disorder with history of seizures 2/2 alcohol withdrawal earlier this year who presented to the ED with abdominal pain.  History difficult to obtain due to patient vomiting.  patient states starting 4 days ago he had significant abdominal pain, nausea and vomiting.  patient states he has had similar episodes to this in the past and was told he had pancreatitis.  Patient has previoulsy been hospitalized for this in the past, most recently in [facility] earlier this year.  Patient states he is a heavy drinker and drinks 14 cans of beers daily.  patient states his last drink was this AM.  Patient states he has previoulsy been hospitalized for alcohol withdrawal and seizures 2/2 alcohol withdrawal however has never been intubated.  Patient states 7 months ago he was admitted to an ICU for seizures 2/2 alcohol withdrawal.  patient states the pain does not radiate to the back currently.  Patient states the pain is diffuse constant abdominal pain, worst in the epigastrium and after eating.  Patient denies nausea, vomiting, headache, blurry vision, dizziness, chest pain, abdominal pain, constipation, diarrhea, leg swelling.     Patient states he Has previously enrolled in detox.  Patient denies taking any medications at home (14 Nov 2023 19:34)      Patient is a 38y old  Male who presents with a chief complaint of Acute pancreatitis and alcohol withdrawal (16 Nov 2023 14:55)      INTERVAL HPI/OVERNIGHT EVENTS:  T(C): 36.7 (11-16-23 @ 16:08), Max: 37.1 (11-16-23 @ 12:54)  HR: 105 (11-16-23 @ 16:08) (95 - 118)  BP: 111/86 (11-16-23 @ 16:08) (111/86 - 142/92)  RR: 24 (11-16-23 @ 16:08) (20 - 24)  SpO2: 96% (11-16-23 @ 16:08) (95% - 98%)  Wt(kg): --  I&O's Summary      REVIEW OF SYSTEMS: denies fever, chills, SOB, palpitations, chest pain, abdominal pain, nausea, vomitting, diarrhea, constipation, dizziness    MEDICATIONS  (STANDING):  enoxaparin Injectable 40 milliGRAM(s) SubCutaneous every 24 hours  influenza   Vaccine 0.5 milliLiter(s) IntraMuscular once  iohexol 300 mG (iodine)/mL Oral Solution 30 milliLiter(s) Oral once  lactated ringers 1000 milliLiter(s) (100 mL/Hr) IV Continuous <Continuous>  multivitamin/minerals 1 Tablet(s) Oral daily  pantoprazole  Injectable 40 milliGRAM(s) IV Push daily  potassium phosphate IVPB 30 milliMole(s) IV Intermittent once  thiamine IVPB 500 milliGRAM(s) IV Intermittent every 8 hours    MEDICATIONS  (PRN):  morphine  - Injectable 2 milliGRAM(s) IV Push every 6 hours PRN Severe Pain (7 - 10)  ondansetron Injectable 4 milliGRAM(s) IV Push every 8 hours PRN Nausea and/or Vomiting  PHENobarbital Injectable 130 milliGRAM(s) IV Push every 15 minutes PRN CIWA >8      PHYSICAL EXAM:  GENERAL: NAD, well-groomed, well-developed  HEAD:  Atraumatic, Normocephalic  EYES: EOMI, PERRLA, conjunctiva and sclera clear  ENMT: No tonsillar erythema, exudates, or enlargement; Moist mucous membranes, Good dentition, No lesions  NECK: Supple, No JVD, Normal thyroid  NERVOUS SYSTEM:  Alert & Oriented X3, Good concentration; Motor Strength 5/5 B/L upper and lower extremities; DTRs 2+ intact and symmetric  CHEST/LUNG: Clear to percussion bilaterally; No rales, rhonchi, wheezing, or rubs  HEART: Regular rate and rhythm; No murmurs, rubs, or gallops  ABDOMEN: Soft, Nontender, Nondistended; Bowel sounds present  EXTREMITIES:  2+ Peripheral Pulses, No clubbing, cyanosis, or edema  LYMPH: No lymphadenopathy noted  SKIN: No rashes or lesions  LABS:                        13.7   4.79  )-----------( 97       ( 16 Nov 2023 05:28 )             39.8     11-16    133<L>  |  101  |  5<L>  ----------------------------<  106<H>  2.9<LL>   |  24  |  0.68    Ca    6.6<L>      16 Nov 2023 05:28  Phos  2.0     11-16  Mg     2.6     11-16    TPro  6.8  /  Alb  2.7<L>  /  TBili  1.3<H>  /  DBili  x   /  AST  36  /  ALT  26  /  AlkPhos  106  11-16    PT/INR - ( 16 Nov 2023 05:28 )   PT: 14.2 sec;   INR: 1.25 ratio           Urinalysis Basic - ( 16 Nov 2023 05:28 )    Color: x / Appearance: x / SG: x / pH: x  Gluc: 106 mg/dL / Ketone: x  / Bili: x / Urobili: x   Blood: x / Protein: x / Nitrite: x   Leuk Esterase: x / RBC: x / WBC x   Sq Epi: x / Non Sq Epi: x / Bacteria: x      CAPILLARY BLOOD GLUCOSE            Urinalysis Basic - ( 16 Nov 2023 05:28 )    Color: x / Appearance: x / SG: x / pH: x  Gluc: 106 mg/dL / Ketone: x  / Bili: x / Urobili: x   Blood: x / Protein: x / Nitrite: x   Leuk Esterase: x / RBC: x / WBC x   Sq Epi: x / Non Sq Epi: x / Bacteria: x

## 2023-11-16 NOTE — PROGRESS NOTE ADULT - ASSESSMENT
seen and examined  vsstable afebrile physical done  ok  awake answering   some shakynes  abd pain better no nausea vomiting  pt was shaky in am getting ativan q2 hourly   ICU was called and accepted    abd soft mild abd tenderness    labs noted na 133  k 2.9   a/p etoh intoxication  pancreatitis  in period of 48-72 hours from last drink   cont ativan  librium  ICU monitoring  suppl K  MG   pancreatitis  better d/w GI Dr alanis to see pt

## 2023-11-16 NOTE — PROGRESS NOTE ADULT - PROBLEM SELECTOR PLAN 1
Maintain NPO for bowel rest  Continue IV fluids   Continue pain mgmt  Dr. Goyal, GI, consulted
Maintain NPO for bowel rest  Continue IV fluids   Continue pain mgmt

## 2023-11-16 NOTE — SBIRT NOTE ADULT - NSSBIRTALCPASSREFTXDET_GEN_A_CORE
Patient admits to daily alcohol use. Patient reported he understands the consequences of excessive alcohol use. Patient is not receptive to any substance abuse resources/referrals at this time.

## 2023-11-16 NOTE — PROGRESS NOTE ADULT - SUBJECTIVE AND OBJECTIVE BOX
HPI:  Patient is a 38 year old male from home, ambulates independently, with PMH of HTN, alcohol use disorder with history of seizures 2/2 alcohol withdrawal earlier this year who presented to the ED with abdominal pain.  History difficult to obtain due to patient vomiting.  patient states starting 4 days ago he had significant abdominal pain, nausea and vomiting.  patient states he has had similar episodes to this in the past and was told he had pancreatitis.  Patient has previoulsy been hospitalized for this in the past, most recently in [facility] earlier this year.  Patient states he is a heavy drinker and drinks 14 cans of beers daily.  patient states his last drink was this AM.  Patient states he has previoulsy been hospitalized for alcohol withdrawal and seizures 2/2 alcohol withdrawal however has never been intubated.  Patient states 7 months ago he was admitted to an ICU for seizures 2/2 alcohol withdrawal.  patient states the pain does not radiate to the back currently.  Patient states the pain is diffuse constant abdominal pain, worst in the epigastrium and after eating.  Patient denies nausea, vomiting, headache, blurry vision, dizziness, chest pain, abdominal pain, constipation, diarrhea, leg swelling.     Patient states he Has previously enrolled in detox.  Patient denies taking any medications at home (14 Nov 2023 19:34)      OVERNIGHT EVENTS:    No new overnight events.  Seen and examined at bedside.     REVIEW OF SYSTEMS:      CONSTITUTIONAL: No fever  EYES: no acute visual disturbances  NECK: No pain or stiffness  RESPIRATORY: No cough; No shortness of breath  CARDIOVASCULAR: No chest pain, no palpitations  GASTROINTESTINAL: No pain. No nausea, vomiting or diarrhea   NEUROLOGICAL: No headache or numbness, no tremors  MUSCULOSKELETAL: No joint pain, no muscle pain  GENITOURINARY: no dysuria, no frequency, no hesitancy  PSYCHIATRY: no depression, no anxiety  ALL OTHER  ROS negative        Vital Signs Last 24 Hrs  T(C): 37 (16 Nov 2023 10:27), Max: 37.3 (15 Nov 2023 16:10)  T(F): 98.6 (16 Nov 2023 10:27), Max: 99.1 (15 Nov 2023 16:10)  HR: 97 (16 Nov 2023 10:27) (95 - 129)  BP: 122/82 (16 Nov 2023 10:27) (120/71 - 143/89)  BP(mean): 92 (16 Nov 2023 10:27) (92 - 102)  RR: 20 (16 Nov 2023 10:27) (20 - 20)  SpO2: 97% (16 Nov 2023 10:27) (95% - 100%)    Parameters below as of 16 Nov 2023 10:27  Patient On (Oxygen Delivery Method): room air        ________________________________________________  PHYSICAL EXAM:    GENERAL: NAD  HEENT: Normocephalic; conjunctivae and sclerae clear;  NECK : supple, no JVD  CHEST/LUNG: Clear to auscultation; Nonlabored  HEART: S1 S2  regular  ABDOMEN: Soft, Nontender, Nondistended; Bowel sounds present  EXTREMITIES: no cyanosis; no LE edema; no calf tenderness  NERVOUS SYSTEM:  Alert; no new deficits  SKIN: warm and dry; No new rashes or lesions    _________________________________________________  CURRENT MEDICATIONS:    MEDICATIONS  (STANDING):  chlordiazePOXIDE   Oral   chlordiazePOXIDE 50 milliGRAM(s) Oral every 8 hours  enoxaparin Injectable 40 milliGRAM(s) SubCutaneous every 24 hours  folic acid 1 milliGRAM(s) Oral daily  influenza   Vaccine 0.5 milliLiter(s) IntraMuscular once  iohexol 300 mG (iodine)/mL Oral Solution 30 milliLiter(s) Oral once  lactated ringers 1000 milliLiter(s) (100 mL/Hr) IV Continuous <Continuous>  multivitamin/minerals 1 Tablet(s) Oral daily  pantoprazole  Injectable 40 milliGRAM(s) IV Push daily  potassium chloride  10 mEq/100 mL IVPB 10 milliEquivalent(s) IV Intermittent every 1 hour  potassium phosphate IVPB 30 milliMole(s) IV Intermittent once  thiamine IVPB 500 milliGRAM(s) IV Intermittent three times a day    MEDICATIONS  (PRN):  aluminum hydroxide/magnesium hydroxide/simethicone Suspension 30 milliLiter(s) Oral every 4 hours PRN Dyspepsia  HYDROmorphone  Injectable 1 milliGRAM(s) IV Push every 6 hours PRN Severe Pain (7 - 10)  LORazepam   Injectable 2 milliGRAM(s) IV Push every 2 hours PRN CIWA-Ar score increase by 2 points and a total score of 7 or less  LORazepam   Injectable 1 milliGRAM(s) IV Push every 1 hour PRN CIWA-Ar score 8 or greater  morphine  - Injectable 2 milliGRAM(s) IV Push every 6 hours PRN Moderate Pain (4 - 6)  ondansetron Injectable 4 milliGRAM(s) IV Push every 8 hours PRN Nausea and/or Vomiting      __________________________________________________  LABS:                          13.7   4.79  )-----------( 97       ( 16 Nov 2023 05:28 )             39.8     11-16    133<L>  |  101  |  5<L>  ----------------------------<  106<H>  2.9<LL>   |  24  |  0.68    Ca    6.6<L>      16 Nov 2023 05:28  Phos  2.0     11-16  Mg     2.6     11-16    TPro  6.8  /  Alb  2.7<L>  /  TBili  1.3<H>  /  DBili  x   /  AST  36  /  ALT  26  /  AlkPhos  106  11-16    PT/INR - ( 16 Nov 2023 05:28 )   PT: 14.2 sec;   INR: 1.25 ratio           Urinalysis Basic - ( 16 Nov 2023 05:28 )    Color: x / Appearance: x / SG: x / pH: x  Gluc: 106 mg/dL / Ketone: x  / Bili: x / Urobili: x   Blood: x / Protein: x / Nitrite: x   Leuk Esterase: x / RBC: x / WBC x   Sq Epi: x / Non Sq Epi: x / Bacteria: x      CAPILLARY BLOOD GLUCOSE          __________________________________________________  RADIOLOGY & ADDITIONAL TESTS:    Imaging Personally Reviewed:  YES    < from: CT Abdomen and Pelvis w/ IV Cont (11.14.23 @ 17:56) >  IMPRESSION:    Mild inflammatory changes in the fat adjacent to the pancreas may   represent acute pancreatitis. The pancreas enhances normally. There is no   ductal dilatation. The surrounding vessels are unremarkable. Correlate   clinically.    Mild thickening of the distal esophagus. Recommend nonemergent endoscopy.    Hepatic steatosis      < end of copied text >    Consultant(s) Notes Reviewed:   YES     Plan of care was discussed with patient and /or primary care giver; all questions and concerns were addressed and care was aligned with patient's wishes.    Plan discussed with attending and consulting physicians.

## 2023-11-16 NOTE — CONSULT NOTE ADULT - REASON FOR ADMISSION
Report to Rocio MARTINEZ  
Acute pancreatitis and alcohol withdrawal
Acute pancreatitis and alcohol withdrawal

## 2023-11-16 NOTE — CONSULT NOTE ADULT - SUBJECTIVE AND OBJECTIVE BOX
Patient is a 38y old  Male who presents with a chief complaint of Acute pancreatitis and alcohol withdrawal (16 Nov 2023 14:55)      HPI:  Patient is a 38 year old male from home, ambulates independently, with PMH of HTN, alcohol use disorder with history of seizures 2/2 alcohol withdrawal earlier this year who presented to the ED with abdominal pain.  History difficult to obtain due to patient vomiting.  patient states starting 4 days ago he had significant abdominal pain, nausea and vomiting.  patient states he has had similar episodes to this in the past and was told he had pancreatitis.  Patient has previoulsy been hospitalized for this in the past, most recently in [facility] earlier this year.  Patient states he is a heavy drinker and drinks 14 cans of beers daily.  patient states his last drink was this AM.  Patient states he has previoulsy been hospitalized for alcohol withdrawal and seizures 2/2 alcohol withdrawal however has never been intubated.  Patient states 7 months ago he was admitted to an ICU for seizures 2/2 alcohol withdrawal.  patient states the pain does not radiate to the back currently.  Patient states the pain is diffuse constant abdominal pain, worst in the epigastrium and after eating.  Patient denies nausea, vomiting, headache, blurry vision, dizziness, chest pain, abdominal pain, constipation, diarrhea, leg swelling.     Interval Hx: patient was admitted for alcohol withdrawal and acute pancreatitis. Was started on Librium taper and required 14 units of Ativan since midnight. ICU was consulted for further management      Allergies    No Known Allergies    Intolerances        MEDICATIONS  (STANDING):  enoxaparin Injectable 40 milliGRAM(s) SubCutaneous every 24 hours  influenza   Vaccine 0.5 milliLiter(s) IntraMuscular once  iohexol 300 mG (iodine)/mL Oral Solution 30 milliLiter(s) Oral once  lactated ringers 1000 milliLiter(s) (100 mL/Hr) IV Continuous <Continuous>  multivitamin/minerals 1 Tablet(s) Oral daily  pantoprazole  Injectable 40 milliGRAM(s) IV Push daily  potassium phosphate IVPB 30 milliMole(s) IV Intermittent once  thiamine IVPB 500 milliGRAM(s) IV Intermittent three times a day    MEDICATIONS  (PRN):  HYDROmorphone  Injectable 1 milliGRAM(s) IV Push every 6 hours PRN Severe Pain (7 - 10)  morphine  - Injectable 2 milliGRAM(s) IV Push every 6 hours PRN Moderate Pain (4 - 6)  ondansetron Injectable 4 milliGRAM(s) IV Push every 8 hours PRN Nausea and/or Vomiting      Daily     Daily     Drug Dosing Weight  Height (cm): 162.6 (14 Nov 2023 10:46)  Weight (kg): 72.6 (14 Nov 2023 10:46)  BMI (kg/m2): 27.5 (14 Nov 2023 10:46)  BSA (m2): 1.78 (14 Nov 2023 10:46)    PAST MEDICAL & SURGICAL HISTORY:  Alcohol abuse      HTN (hypertension)      History of seizure due to alcohol withdrawal          FAMILY HISTORY:      SOCIAL HISTORY:    ADVANCE DIRECTIVES:    REVIEW OF SYSTEMS:    unable to access to due agitation       ICU Vital Signs Last 24 Hrs  T(C): 36.6 (16 Nov 2023 13:46), Max: 37.3 (15 Nov 2023 16:10)  T(F): 97.9 (16 Nov 2023 13:46), Max: 99.1 (15 Nov 2023 16:10)  HR: 105 (16 Nov 2023 13:46) (95 - 126)  BP: 118/85 (16 Nov 2023 13:46) (118/85 - 142/92)  BP(mean): 92 (16 Nov 2023 10:27) (92 - 102)  ABP: --  ABP(mean): --  RR: 20 (16 Nov 2023 13:46) (20 - 20)  SpO2: 97% (16 Nov 2023 13:46) (95% - 100%)    O2 Parameters below as of 16 Nov 2023 13:46  Patient On (Oxygen Delivery Method): room air                I&O's Detail      PHYSICAL EXAM:    GENERAL: agitated, no tremors   HEAD:  Atraumatic, Normocephalic  EYES: conjunctiva and sclera clear  NERVOUS SYSTEM:  Alert & Oriented X 1, [place]  CHEST/LUNG: unable to access due to agitation   HEART: unable to access due to agitation   ABDOMEN: unable to access due to agitation   EXTREMITIES: unable to access due to agitation       LABS:  CBC Full  -  ( 16 Nov 2023 05:28 )  WBC Count : 4.79 K/uL  RBC Count : 4.50 M/uL  Hemoglobin : 13.7 g/dL  Hematocrit : 39.8 %  Platelet Count - Automated : 97 K/uL  Mean Cell Volume : 88.4 fl  Mean Cell Hemoglobin : 30.4 pg  Mean Cell Hemoglobin Concentration : 34.4 gm/dL  Auto Neutrophil # : x  Auto Lymphocyte # : x  Auto Monocyte # : x  Auto Eosinophil # : x  Auto Basophil # : x  Auto Neutrophil % : x  Auto Lymphocyte % : x  Auto Monocyte % : x  Auto Eosinophil % : x  Auto Basophil % : x    11-16    133<L>  |  101  |  5<L>  ----------------------------<  106<H>  2.9<LL>   |  24  |  0.68    Ca    6.6<L>      16 Nov 2023 05:28  Phos  2.0     11-16  Mg     2.6     11-16    TPro  6.8  /  Alb  2.7<L>  /  TBili  1.3<H>  /  DBili  x   /  AST  36  /  ALT  26  /  AlkPhos  106  11-16    CAPILLARY BLOOD GLUCOSE        PT/INR - ( 16 Nov 2023 05:28 )   PT: 14.2 sec;   INR: 1.25 ratio           Urinalysis Basic - ( 16 Nov 2023 05:28 )    Color: x / Appearance: x / SG: x / pH: x  Gluc: 106 mg/dL / Ketone: x  / Bili: x / Urobili: x   Blood: x / Protein: x / Nitrite: x   Leuk Esterase: x / RBC: x / WBC x   Sq Epi: x / Non Sq Epi: x / Bacteria: x          Culture Results:   <10,000 CFU/mL Normal Urogenital Sandee (11-14 @ 20:25)      EKG:    ECHO, US:    RADIOLOGY:    CRITICAL CARE TIME SPENT:  
[  ] STAT REQUEST              [ X ] ROUTINE REQUEST    Patient is a 38 year old male with Abdominal pain. GI consulted to evaluate.        HPI:  Patient is a 38 year old male from home, ambulates independently, with PMH of HTN, ETOH abuse, seizures 2/2 alcohol withdrawal earlier this year who presented to the ED with 4 days history of sharp constant 10/10 intensity epigastric abdominal pain radiating to his back associated with nausea and non bloody vomiting. Patient states he has been drinking heavily (14 cans of beers daily).  Patient denies hematemesis, hematochezia, melena, fever, chills. chest pain, SOB, cough, hematuria, dysuria or diarrhea.       PAIN MANAGEMENT:  Pain Scale:                 10/10  Pain Location:  Epigastric abdominal pain       PAST MEDICAL HISTORY    ETOH abuse    HTN (hypertension)    Seizure disorder due to alcohol withdrawal        PAST SURGICAL HISTORY    No significant surgical history reported      Allergies    No Known Allergies    Intolerances  None         MEDICATIONS  (STANDING):  chlordiazePOXIDE   Oral   chlordiazePOXIDE 50 milliGRAM(s) Oral every 8 hours  enoxaparin Injectable 40 milliGRAM(s) SubCutaneous every 24 hours  folic acid 1 milliGRAM(s) Oral daily  influenza   Vaccine 0.5 milliLiter(s) IntraMuscular once  iohexol 300 mG (iodine)/mL Oral Solution 30 milliLiter(s) Oral once  lactated ringers 1000 milliLiter(s) (100 mL/Hr) IV Continuous <Continuous>  multivitamin/minerals 1 Tablet(s) Oral daily  pantoprazole  Injectable 40 milliGRAM(s) IV Push daily  potassium phosphate IVPB 30 milliMole(s) IV Intermittent once  thiamine IVPB 500 milliGRAM(s) IV Intermittent three times a day      MEDICATIONS  (PRN):  aluminum hydroxide/magnesium hydroxide/simethicone Suspension 30 milliLiter(s) Oral every 4 hours PRN Dyspepsia  HYDROmorphone  Injectable 1 milliGRAM(s) IV Push every 6 hours PRN Severe Pain (7 - 10)  LORazepam   Injectable 2 milliGRAM(s) IV Push every 2 hours PRN CIWA-Ar score increase by 2 points and a total score of 7 or less  LORazepam   Injectable 1 milliGRAM(s) IV Push every 1 hour PRN CIWA-Ar score 8 or greater  morphine  - Injectable 2 milliGRAM(s) IV Push every 6 hours PRN Moderate Pain (4 - 6)  ondansetron Injectable 4 milliGRAM(s) IV Push every 8 hours PRN Nausea and/or Vomiting      SOCIAL HISTORY  Advanced Directives:       [X  ] Full Code       [  ] DNR  Marital Status:         [  ] M      [ X ] S      [  ] D       [  ] W  Children:       [  X] Yes      [  ] No  Occupation:        [  ] Employed       [X  ] Unemployed       [  ] Retired  Diet:       [ X ] Regular       [  ] PEG feeding          [  ] NG tube feeding  Drug Use:           [ X ] Patient denied          [  ] Yes  Alcohol:           [  ] No             [  ] Yes (socially)         [ X ] Yes (chronic)  Tobacco:           [  ] Yes           [ X ] No      FAMILY HISTORY  [ X ] Heart Disease            [X  ] Diabetes             [ X ] HTN             [  ] Colon Cancer             [  ] Stomach Cancer              [  ] Pancreatic Cancer      VITAL SIGNS   Vital Signs Last 24 Hrs  T(C): 36.6 (16 Nov 2023 13:46), Max: 37.3 (15 Nov 2023 16:10)  T(F): 97.9 (16 Nov 2023 13:46), Max: 99.1 (15 Nov 2023 16:10)  HR: 105 (16 Nov 2023 13:46) (95 - 126)  BP: 118/85 (16 Nov 2023 13:46) (118/85 - 142/92)  BP(mean): 92 (16 Nov 2023 10:27) (92 - 102)  RR: 20 (16 Nov 2023 13:46) (20 - 20)  SpO2: 97% (16 Nov 2023 13:46) (95% - 100%)  Parameters below as of 16 Nov 2023 13:46  Patient On (Oxygen Delivery Method): room air         CBC Full  -  ( 16 Nov 2023 05:28 )  WBC Count : 4.79 K/uL  RBC Count : 4.50 M/uL  Hemoglobin : 13.7 g/dL  Hematocrit : 39.8 %  Platelet Count - Automated : 97 K/uL  Mean Cell Volume : 88.4 fl  Mean Cell Hemoglobin : 30.4 pg  Mean Cell Hemoglobin Concentration : 34.4 gm/dL  Auto Neutrophil # : x  Auto Lymphocyte # : x  Auto Monocyte # : x  Auto Eosinophil # : x  Auto Basophil # : x  Auto Neutrophil % : x  Auto Lymphocyte % : x  Auto Monocyte % : x  Auto Eosinophil % : x  Auto Basophil % : x      11-16    133<L>  |  101  |  5<L>  ----------------------------<  106<H>  2.9<LL>   |  24  |  0.68    Ca    6.6<L>      16 Nov 2023 05:28  Phos  2.0     11-16  Mg     2.6     11-16    TPro  6.8  /  Alb  2.7<L>  /  TBili  1.3<H>  /  DBili  x   /  AST  36  /  ALT  26  /  AlkPhos  106  11-16    PT/INR - ( 16 Nov 2023 05:28 )   PT: 14.2 sec;   INR: 1.25 ratio       Urinalysis (11.14.23 @ 20:25)   Glucose Qualitative, Urine: Negative mg/dL  Blood, Urine: Negative  pH Urine: 7.0  Color: Yellow  Urine Appearance: Clear  Bilirubin: Negative  Ketone - Urine: Negative mg/dL  Specific Gravity: 1.010  Protein, Urine: Trace mg/dL  Urobilinogen: 0.2 mg/dL  Nitrite: Negative  Leukocyte Esterase Concentration: Negative< from: 12 Lead ECG (11.15.23 @ 10:50) >    Ventricular Rate 120 BPM    Atrial Rate 120 BPM    P-R Interval 134 ms    QRS Duration 84 ms    Q-T Interval 324 ms    QTC Calculation(Bazett) 457 ms    P Axis 60 degrees    R Axis -7 degrees    T Axis 48 degrees    Diagnosis Line Sinus tachycardia  Otherwise normal ECG    < end of copied text >        RADIOLOGY/IMAGING                  ACC: [id number] EXAM:  CT ABDOMEN AND PELVIS IC   ORDERED BY:  BRIGHT MAYORGA     PROCEDURE DATE:  11/14/2023          INTERPRETATION:  CLINICAL INFORMATION: Abdominal pain. Pancreatitis.   Evaluate for abscess    COMPARISON: None.    CONTRAST/COMPLICATIONS:  IV Contrast: Omnipaque 350  90 cc administered   10 cc discarded  Oral Contrast: NONE  Complications: None reported at time of study completion    PROCEDURE:  CT of the Abdomen and Pelvis was performed.  Sagittal and coronal reformats were performed.    FINDINGS:  LOWER CHEST: Mild left basilar atelectasis.    LIVER: Hepatic steatosis.  BILE DUCTS: Normal caliber.  GALLBLADDER: Within normal limits.  SPLEEN: Within normal limits.  PANCREAS: Mild inflammatory changes in the fat adjacent to the pancreas   may represent acute pancreatitis. The pancreas enhances normally. There   is no ductal dilatation. The surrounding vessels are unremarkable.  ADRENALS: Within normal limits.  KIDNEYS/URETERS: Within normal limits.    BLADDER: Underdistended but otherwise unremarkable urinary bladder.  REPRODUCTIVE ORGANS: Prostate within normal limits.    BOWEL: Mild thickening of the distal esophagus. Recommend nonemergent   endoscopy. No bowel obstruction. Appendix is normal.  PERITONEUM: No ascites.  VESSELS: Within normal limits.  RETROPERITONEUM/LYMPH NODES: No lymphadenopathy.  ABDOMINAL WALL: Within normal limits.  BONES: Within normal limits.    IMPRESSION:    Mild inflammatory changes in the fat adjacent to the pancreas may   represent acute pancreatitis. The pancreas enhances normally. There is no   ductal dilatation. The surrounding vessels are unremarkable. Correlate   clinically.    Mild thickening of the distal esophagus. Recommend nonemergent endoscopy.    Hepatic steatosis

## 2023-11-17 LAB
ALBUMIN SERPL ELPH-MCNC: 2.8 G/DL — LOW (ref 3.5–5)
ALBUMIN SERPL ELPH-MCNC: 2.8 G/DL — LOW (ref 3.5–5)
ALP SERPL-CCNC: 106 U/L — SIGNIFICANT CHANGE UP (ref 40–120)
ALP SERPL-CCNC: 106 U/L — SIGNIFICANT CHANGE UP (ref 40–120)
ALT FLD-CCNC: 30 U/L DA — SIGNIFICANT CHANGE UP (ref 10–60)
ALT FLD-CCNC: 30 U/L DA — SIGNIFICANT CHANGE UP (ref 10–60)
ANION GAP SERPL CALC-SCNC: 9 MMOL/L — SIGNIFICANT CHANGE UP (ref 5–17)
ANION GAP SERPL CALC-SCNC: 9 MMOL/L — SIGNIFICANT CHANGE UP (ref 5–17)
AST SERPL-CCNC: 45 U/L — HIGH (ref 10–40)
AST SERPL-CCNC: 45 U/L — HIGH (ref 10–40)
BASOPHILS # BLD AUTO: 0.04 K/UL — SIGNIFICANT CHANGE UP (ref 0–0.2)
BASOPHILS # BLD AUTO: 0.04 K/UL — SIGNIFICANT CHANGE UP (ref 0–0.2)
BASOPHILS NFR BLD AUTO: 0.9 % — SIGNIFICANT CHANGE UP (ref 0–2)
BASOPHILS NFR BLD AUTO: 0.9 % — SIGNIFICANT CHANGE UP (ref 0–2)
BILIRUB SERPL-MCNC: 1 MG/DL — SIGNIFICANT CHANGE UP (ref 0.2–1.2)
BILIRUB SERPL-MCNC: 1 MG/DL — SIGNIFICANT CHANGE UP (ref 0.2–1.2)
BUN SERPL-MCNC: 6 MG/DL — LOW (ref 7–18)
BUN SERPL-MCNC: 6 MG/DL — LOW (ref 7–18)
CALCIUM SERPL-MCNC: 6.9 MG/DL — LOW (ref 8.4–10.5)
CALCIUM SERPL-MCNC: 6.9 MG/DL — LOW (ref 8.4–10.5)
CHLORIDE SERPL-SCNC: 104 MMOL/L — SIGNIFICANT CHANGE UP (ref 96–108)
CHLORIDE SERPL-SCNC: 104 MMOL/L — SIGNIFICANT CHANGE UP (ref 96–108)
CO2 SERPL-SCNC: 22 MMOL/L — SIGNIFICANT CHANGE UP (ref 22–31)
CO2 SERPL-SCNC: 22 MMOL/L — SIGNIFICANT CHANGE UP (ref 22–31)
CREAT SERPL-MCNC: 0.66 MG/DL — SIGNIFICANT CHANGE UP (ref 0.5–1.3)
CREAT SERPL-MCNC: 0.66 MG/DL — SIGNIFICANT CHANGE UP (ref 0.5–1.3)
EGFR: 122 ML/MIN/1.73M2 — SIGNIFICANT CHANGE UP
EGFR: 122 ML/MIN/1.73M2 — SIGNIFICANT CHANGE UP
EOSINOPHIL # BLD AUTO: 0.1 K/UL — SIGNIFICANT CHANGE UP (ref 0–0.5)
EOSINOPHIL # BLD AUTO: 0.1 K/UL — SIGNIFICANT CHANGE UP (ref 0–0.5)
EOSINOPHIL NFR BLD AUTO: 2.3 % — SIGNIFICANT CHANGE UP (ref 0–6)
EOSINOPHIL NFR BLD AUTO: 2.3 % — SIGNIFICANT CHANGE UP (ref 0–6)
GLUCOSE BLDC GLUCOMTR-MCNC: 101 MG/DL — HIGH (ref 70–99)
GLUCOSE BLDC GLUCOMTR-MCNC: 101 MG/DL — HIGH (ref 70–99)
GLUCOSE SERPL-MCNC: 90 MG/DL — SIGNIFICANT CHANGE UP (ref 70–99)
GLUCOSE SERPL-MCNC: 90 MG/DL — SIGNIFICANT CHANGE UP (ref 70–99)
HCT VFR BLD CALC: 39.5 % — SIGNIFICANT CHANGE UP (ref 39–50)
HCT VFR BLD CALC: 39.5 % — SIGNIFICANT CHANGE UP (ref 39–50)
HGB BLD-MCNC: 13.6 G/DL — SIGNIFICANT CHANGE UP (ref 13–17)
HGB BLD-MCNC: 13.6 G/DL — SIGNIFICANT CHANGE UP (ref 13–17)
IMM GRANULOCYTES NFR BLD AUTO: 0.2 % — SIGNIFICANT CHANGE UP (ref 0–0.9)
IMM GRANULOCYTES NFR BLD AUTO: 0.2 % — SIGNIFICANT CHANGE UP (ref 0–0.9)
LYMPHOCYTES # BLD AUTO: 0.51 K/UL — LOW (ref 1–3.3)
LYMPHOCYTES # BLD AUTO: 0.51 K/UL — LOW (ref 1–3.3)
LYMPHOCYTES # BLD AUTO: 11.8 % — LOW (ref 13–44)
LYMPHOCYTES # BLD AUTO: 11.8 % — LOW (ref 13–44)
MAGNESIUM SERPL-MCNC: 2.5 MG/DL — SIGNIFICANT CHANGE UP (ref 1.6–2.6)
MAGNESIUM SERPL-MCNC: 2.5 MG/DL — SIGNIFICANT CHANGE UP (ref 1.6–2.6)
MCHC RBC-ENTMCNC: 30.2 PG — SIGNIFICANT CHANGE UP (ref 27–34)
MCHC RBC-ENTMCNC: 30.2 PG — SIGNIFICANT CHANGE UP (ref 27–34)
MCHC RBC-ENTMCNC: 34.4 GM/DL — SIGNIFICANT CHANGE UP (ref 32–36)
MCHC RBC-ENTMCNC: 34.4 GM/DL — SIGNIFICANT CHANGE UP (ref 32–36)
MCV RBC AUTO: 87.8 FL — SIGNIFICANT CHANGE UP (ref 80–100)
MCV RBC AUTO: 87.8 FL — SIGNIFICANT CHANGE UP (ref 80–100)
MONOCYTES # BLD AUTO: 0.43 K/UL — SIGNIFICANT CHANGE UP (ref 0–0.9)
MONOCYTES # BLD AUTO: 0.43 K/UL — SIGNIFICANT CHANGE UP (ref 0–0.9)
MONOCYTES NFR BLD AUTO: 9.9 % — SIGNIFICANT CHANGE UP (ref 2–14)
MONOCYTES NFR BLD AUTO: 9.9 % — SIGNIFICANT CHANGE UP (ref 2–14)
NEUTROPHILS # BLD AUTO: 3.24 K/UL — SIGNIFICANT CHANGE UP (ref 1.8–7.4)
NEUTROPHILS # BLD AUTO: 3.24 K/UL — SIGNIFICANT CHANGE UP (ref 1.8–7.4)
NEUTROPHILS NFR BLD AUTO: 74.9 % — SIGNIFICANT CHANGE UP (ref 43–77)
NEUTROPHILS NFR BLD AUTO: 74.9 % — SIGNIFICANT CHANGE UP (ref 43–77)
NRBC # BLD: 0 /100 WBCS — SIGNIFICANT CHANGE UP (ref 0–0)
NRBC # BLD: 0 /100 WBCS — SIGNIFICANT CHANGE UP (ref 0–0)
PHOSPHATE SERPL-MCNC: 2.6 MG/DL — SIGNIFICANT CHANGE UP (ref 2.5–4.5)
PHOSPHATE SERPL-MCNC: 2.6 MG/DL — SIGNIFICANT CHANGE UP (ref 2.5–4.5)
PLATELET # BLD AUTO: 92 K/UL — LOW (ref 150–400)
PLATELET # BLD AUTO: 92 K/UL — LOW (ref 150–400)
POTASSIUM SERPL-MCNC: 3.5 MMOL/L — SIGNIFICANT CHANGE UP (ref 3.5–5.3)
POTASSIUM SERPL-MCNC: 3.5 MMOL/L — SIGNIFICANT CHANGE UP (ref 3.5–5.3)
POTASSIUM SERPL-SCNC: 3.5 MMOL/L — SIGNIFICANT CHANGE UP (ref 3.5–5.3)
POTASSIUM SERPL-SCNC: 3.5 MMOL/L — SIGNIFICANT CHANGE UP (ref 3.5–5.3)
PROT SERPL-MCNC: 7 G/DL — SIGNIFICANT CHANGE UP (ref 6–8.3)
PROT SERPL-MCNC: 7 G/DL — SIGNIFICANT CHANGE UP (ref 6–8.3)
RBC # BLD: 4.5 M/UL — SIGNIFICANT CHANGE UP (ref 4.2–5.8)
RBC # BLD: 4.5 M/UL — SIGNIFICANT CHANGE UP (ref 4.2–5.8)
RBC # FLD: 13.6 % — SIGNIFICANT CHANGE UP (ref 10.3–14.5)
RBC # FLD: 13.6 % — SIGNIFICANT CHANGE UP (ref 10.3–14.5)
SODIUM SERPL-SCNC: 135 MMOL/L — SIGNIFICANT CHANGE UP (ref 135–145)
SODIUM SERPL-SCNC: 135 MMOL/L — SIGNIFICANT CHANGE UP (ref 135–145)
WBC # BLD: 4.33 K/UL — SIGNIFICANT CHANGE UP (ref 3.8–10.5)
WBC # BLD: 4.33 K/UL — SIGNIFICANT CHANGE UP (ref 3.8–10.5)
WBC # FLD AUTO: 4.33 K/UL — SIGNIFICANT CHANGE UP (ref 3.8–10.5)
WBC # FLD AUTO: 4.33 K/UL — SIGNIFICANT CHANGE UP (ref 3.8–10.5)

## 2023-11-17 PROCEDURE — 99233 SBSQ HOSP IP/OBS HIGH 50: CPT | Mod: GC

## 2023-11-17 RX ORDER — POTASSIUM PHOSPHATE, MONOBASIC POTASSIUM PHOSPHATE, DIBASIC 236; 224 MG/ML; MG/ML
15 INJECTION, SOLUTION INTRAVENOUS ONCE
Refills: 0 | Status: COMPLETED | OUTPATIENT
Start: 2023-11-17 | End: 2023-11-17

## 2023-11-17 RX ORDER — SODIUM CHLORIDE 9 MG/ML
1000 INJECTION, SOLUTION INTRAVENOUS
Refills: 0 | Status: DISCONTINUED | OUTPATIENT
Start: 2023-11-17 | End: 2023-11-18

## 2023-11-17 RX ADMIN — SODIUM CHLORIDE 100 MILLILITER(S): 9 INJECTION, SOLUTION INTRAVENOUS at 21:40

## 2023-11-17 RX ADMIN — Medication 2 MILLIGRAM(S): at 03:39

## 2023-11-17 RX ADMIN — Medication 2 MILLIGRAM(S): at 01:59

## 2023-11-17 RX ADMIN — Medication 105 MILLIGRAM(S): at 05:38

## 2023-11-17 RX ADMIN — PANTOPRAZOLE SODIUM 40 MILLIGRAM(S): 20 TABLET, DELAYED RELEASE ORAL at 11:43

## 2023-11-17 RX ADMIN — Medication 105 MILLIGRAM(S): at 15:57

## 2023-11-17 RX ADMIN — SODIUM CHLORIDE 100 MILLILITER(S): 9 INJECTION, SOLUTION INTRAVENOUS at 09:18

## 2023-11-17 RX ADMIN — SODIUM CHLORIDE 100 MILLILITER(S): 9 INJECTION, SOLUTION INTRAVENOUS at 08:03

## 2023-11-17 RX ADMIN — Medication 2 MILLIGRAM(S): at 03:24

## 2023-11-17 RX ADMIN — Medication 1 TABLET(S): at 11:43

## 2023-11-17 RX ADMIN — POTASSIUM PHOSPHATE, MONOBASIC POTASSIUM PHOSPHATE, DIBASIC 62.5 MILLIMOLE(S): 236; 224 INJECTION, SOLUTION INTRAVENOUS at 07:58

## 2023-11-17 RX ADMIN — Medication 105 MILLIGRAM(S): at 21:06

## 2023-11-17 NOTE — PROGRESS NOTE ADULT - ASSESSMENT
38 year old male  with PMH of HTN, alcohol use disorder with history of seizures 2/2 alcohol withdrawal earlier this year who presented to the ED with abdominal pain. Patient was admitted for alcohol withdrawal and acute pancreatitis Was started on Librium taper and required 15mg of ativan, with current CIWA 10. Patient admitted to ICU for further management.     =================== Neuro============================  # Alcohol Withdrawal  hx of seizures w/w alochol withdrawal  drink 12 cans of beer daily  Was started on Librium taper   Required 14 units of ativan  s/p Phenobarbital protocol   s/p Ativan 22mg total overnight   Sedated  Will watch of Ativan for now   CIWA  C/W thiamine   Folic acid  Multivitamin          ================= Cardiovascular==========================  # No issues      ================- Pulm=================================  # No issues     ==================ID===================================  # No issues     ================= Nephro================================  # Hypokalemia  Resolved  Monitor electrolytes   Supplement as needed    =================GI====================================  # Acute Pancreatitis  p/w abdominal pain, nausea, and vomiting  Lipase 620  CT A.P : Mild inflammatory changes in the fat adjacent to the pancreas may   represent acute pancreatitis  s/p IVF  Started on D5 and NS  Advance diet to clear liquid  Will keep monitoring for now     =============== Heme==================================  # Thrombocytopenia  Plts 138>97>92  likely 2/2 alcohol abuse  no signs of bleeding   continue to trend     =================Endocrine===============================  # No issues     ================= Skin/Catheters============================  Peripheral IV lines       =================Prophylaxis =============================  DVT prophylaxis Lovenox   GI prophylaxis     ==================GOC==================================  FULL CODE   Disposition : ICU

## 2023-11-17 NOTE — PROGRESS NOTE ADULT - ASSESSMENT
1. Abdominal pain  2. Alcoholic pancreatitis  3. DT's  4. ETOH abuse  5. Thrombocytopenia (ETOH induced)  6. Steatohepatitis  7. Thickened wall of distal esophagus  8. Esophagitis    Suggestions:    1. NPO  2. IVF hydration  3. Protonix daily  4. Avoid NSAID  5. DT's precaution  6. Thiamine / Folate / MVI  7. Monitor electrolytes  8. Monitor platelet count  9. EGD out patient  10. DVT prophylaxis

## 2023-11-17 NOTE — DIETITIAN INITIAL EVALUATION ADULT - PERTINENT LABORATORY DATA
11-17    135  |  104  |  6<L>  ----------------------------<  90  3.5   |  22  |  0.66    Ca    6.9<L>      17 Nov 2023 05:46  Phos  2.6     11-17  Mg     2.5     11-17    TPro  7.0  /  Alb  2.8<L>  /  TBili  1.0  /  DBili  x   /  AST  45<H>  /  ALT  30  /  AlkPhos  106  11-17

## 2023-11-17 NOTE — PROGRESS NOTE ADULT - SUBJECTIVE AND OBJECTIVE BOX
HPI:  Patient is a 38 year old male from home, ambulates independently, with PMH of HTN, alcohol use disorder with history of seizures 2/2 alcohol withdrawal earlier this year who presented to the ED with abdominal pain.  History difficult to obtain due to patient vomiting.  patient states starting 4 days ago he had significant abdominal pain, nausea and vomiting.  patient states he has had similar episodes to this in the past and was told he had pancreatitis.  Patient has previoulsy been hospitalized for this in the past, most recently in [facility] earlier this year.  Patient states he is a heavy drinker and drinks 14 cans of beers daily.  patient states his last drink was this AM.  Patient states he has previoulsy been hospitalized for alcohol withdrawal and seizures 2/2 alcohol withdrawal however has never been intubated.  Patient states 7 months ago he was admitted to an ICU for seizures 2/2 alcohol withdrawal.  patient states the pain does not radiate to the back currently.  Patient states the pain is diffuse constant abdominal pain, worst in the epigastrium and after eating.  Patient denies nausea, vomiting, headache, blurry vision, dizziness, chest pain, abdominal pain, constipation, diarrhea, leg swelling.     Patient states he Has previously enrolled in detox.  Patient denies taking any medications at home (14 Nov 2023 19:34)      Patient is a 38y old  Male who presents with a chief complaint of Acute pancreatitis without infection or necrosis     (17 Nov 2023 16:02)      INTERVAL HPI/OVERNIGHT EVENTS:  T(C): 35.9 (11-17-23 @ 16:56), Max: 36.5 (11-16-23 @ 23:03)  HR: 87 (11-17-23 @ 19:00) (77 - 112)  BP: 110/82 (11-17-23 @ 19:00) (86/69 - 130/87)  RR: 15 (11-17-23 @ 19:00) (13 - 20)  SpO2: 92% (11-17-23 @ 19:00) (85% - 100%)  Wt(kg): --  I&O's Summary    16 Nov 2023 07:01  -  17 Nov 2023 07:00  --------------------------------------------------------  IN: 1749.9 mL / OUT: 1000 mL / NET: 749.9 mL    17 Nov 2023 07:01  -  17 Nov 2023 20:31  --------------------------------------------------------  IN: 1512.5 mL / OUT: 0 mL / NET: 1512.5 mL        REVIEW OF SYSTEMS: denies fever, chills, SOB, palpitations, chest pain, abdominal pain, nausea, vomitting, diarrhea, constipation, dizziness    MEDICATIONS  (STANDING):  dextrose 5% + sodium chloride 0.9%. 1000 milliLiter(s) (100 mL/Hr) IV Continuous <Continuous>  enoxaparin Injectable 40 milliGRAM(s) SubCutaneous every 24 hours  influenza   Vaccine 0.5 milliLiter(s) IntraMuscular once  iohexol 300 mG (iodine)/mL Oral Solution 30 milliLiter(s) Oral once  multivitamin/minerals 1 Tablet(s) Oral daily  pantoprazole  Injectable 40 milliGRAM(s) IV Push daily  thiamine IVPB 500 milliGRAM(s) IV Intermittent every 8 hours    MEDICATIONS  (PRN):  LORazepam   Injectable 2 milliGRAM(s) IV Push every 1 hour PRN CIWA-Ar score 8 or greater  morphine  - Injectable 2 milliGRAM(s) IV Push every 6 hours PRN Severe Pain (7 - 10)  ondansetron Injectable 4 milliGRAM(s) IV Push every 8 hours PRN Nausea and/or Vomiting      PHYSICAL EXAM:  GENERAL: NAD, well-groomed, well-developed  HEAD:  Atraumatic, Normocephalic  EYES: EOMI, PERRLA, conjunctiva and sclera clear  ENMT: No tonsillar erythema, exudates, or enlargement; Moist mucous membranes, Good dentition, No lesions  NECK: Supple, No JVD, Normal thyroid  NERVOUS SYSTEM:  Alert & Oriented X3, Good concentration; Motor Strength 5/5 B/L upper and lower extremities; DTRs 2+ intact and symmetric  CHEST/LUNG: Clear to percussion bilaterally; No rales, rhonchi, wheezing, or rubs  HEART: Regular rate and rhythm; No murmurs, rubs, or gallops  ABDOMEN: Soft, Nontender, Nondistended; Bowel sounds present  EXTREMITIES:  2+ Peripheral Pulses, No clubbing, cyanosis, or edema  LYMPH: No lymphadenopathy noted  SKIN: No rashes or lesions  LABS:                        13.6   4.33  )-----------( 92       ( 17 Nov 2023 05:46 )             39.5     11-17    135  |  104  |  6<L>  ----------------------------<  90  3.5   |  22  |  0.66    Ca    6.9<L>      17 Nov 2023 05:46  Phos  2.6     11-17  Mg     2.5     11-17    TPro  7.0  /  Alb  2.8<L>  /  TBili  1.0  /  DBili  x   /  AST  45<H>  /  ALT  30  /  AlkPhos  106  11-17    PT/INR - ( 16 Nov 2023 05:28 )   PT: 14.2 sec;   INR: 1.25 ratio           Urinalysis Basic - ( 17 Nov 2023 05:46 )    Color: x / Appearance: x / SG: x / pH: x  Gluc: 90 mg/dL / Ketone: x  / Bili: x / Urobili: x   Blood: x / Protein: x / Nitrite: x   Leuk Esterase: x / RBC: x / WBC x   Sq Epi: x / Non Sq Epi: x / Bacteria: x      CAPILLARY BLOOD GLUCOSE      POCT Blood Glucose.: 101 mg/dL (17 Nov 2023 17:56)        Urinalysis Basic - ( 17 Nov 2023 05:46 )    Color: x / Appearance: x / SG: x / pH: x  Gluc: 90 mg/dL / Ketone: x  / Bili: x / Urobili: x   Blood: x / Protein: x / Nitrite: x   Leuk Esterase: x / RBC: x / WBC x   Sq Epi: x / Non Sq Epi: x / Bacteria: x

## 2023-11-17 NOTE — PROGRESS NOTE ADULT - SUBJECTIVE AND OBJECTIVE BOX
[ X  ] ICU                                          [   ] CCU                                      [   ] Medical Floor    Patient is a 38 year old male with Abdominal pain. GI consulted to evaluate.        HPI:  Patient is a 38 year old male from home, ambulates independently, with PMH of HTN, ETOH abuse, seizures 2/2 alcohol withdrawal earlier this year who presented to the ED with 4 days history of sharp constant 10/10 intensity epigastric abdominal pain radiating to his back associated with nausea and non bloody vomiting. Patient states he has been drinking heavily (14 cans of beers daily).  Patient denies hematemesis, hematochezia, melena, fever, chills. chest pain, SOB, cough, hematuria, dysuria or diarrhea.       PAIN MANAGEMENT:  Pain Scale:                 10/10  Pain Location:  Epigastric abdominal pain       PAST MEDICAL HISTORY    ETOH abuse    HTN (hypertension)    Seizure disorder due to alcohol withdrawal        PAST SURGICAL HISTORY    No significant surgical history reported      Allergies    No Known Allergies    Intolerances  None         MEDICATIONS  (STANDING):  chlordiazePOXIDE   Oral   chlordiazePOXIDE 50 milliGRAM(s) Oral every 8 hours  enoxaparin Injectable 40 milliGRAM(s) SubCutaneous every 24 hours  folic acid 1 milliGRAM(s) Oral daily  influenza   Vaccine 0.5 milliLiter(s) IntraMuscular once  iohexol 300 mG (iodine)/mL Oral Solution 30 milliLiter(s) Oral once  lactated ringers 1000 milliLiter(s) (100 mL/Hr) IV Continuous <Continuous>  multivitamin/minerals 1 Tablet(s) Oral daily  pantoprazole  Injectable 40 milliGRAM(s) IV Push daily  potassium phosphate IVPB 30 milliMole(s) IV Intermittent once  thiamine IVPB 500 milliGRAM(s) IV Intermittent three times a day      MEDICATIONS  (PRN):  aluminum hydroxide/magnesium hydroxide/simethicone Suspension 30 milliLiter(s) Oral every 4 hours PRN Dyspepsia  HYDROmorphone  Injectable 1 milliGRAM(s) IV Push every 6 hours PRN Severe Pain (7 - 10)  LORazepam   Injectable 2 milliGRAM(s) IV Push every 2 hours PRN CIWA-Ar score increase by 2 points and a total score of 7 or less  LORazepam   Injectable 1 milliGRAM(s) IV Push every 1 hour PRN CIWA-Ar score 8 or greater  morphine  - Injectable 2 milliGRAM(s) IV Push every 6 hours PRN Moderate Pain (4 - 6)  ondansetron Injectable 4 milliGRAM(s) IV Push every 8 hours PRN Nausea and/or Vomiting      SOCIAL HISTORY  Advanced Directives:       [X  ] Full Code       [  ] DNR  Marital Status:         [  ] M      [ X ] S      [  ] D       [  ] W  Children:       [  X] Yes      [  ] No  Occupation:        [  ] Employed       [X  ] Unemployed       [  ] Retired  Diet:       [ X ] Regular       [  ] PEG feeding          [  ] NG tube feeding  Drug Use:           [ X ] Patient denied          [  ] Yes  Alcohol:           [  ] No             [  ] Yes (socially)         [ X ] Yes (chronic)  Tobacco:           [  ] Yes           [ X ] No      FAMILY HISTORY  [ X ] Heart Disease            [X  ] Diabetes             [ X ] HTN             [  ] Colon Cancer             [  ] Stomach Cancer              [  ] Pancreatic Cancer        VITALS   Vital Signs Last 24 Hrs  T(C): 36.4 (17 Nov 2023 04:08), Max: 37.1 (16 Nov 2023 12:54)  T(F): 97.6 (17 Nov 2023 04:08), Max: 98.8 (16 Nov 2023 12:54)  HR: 91 (17 Nov 2023 06:00) (56 - 112)  BP: 87/74 (17 Nov 2023 06:00) (87/74 - 130/87)  BP(mean): 80 (17 Nov 2023 06:00) (75 - 108)  RR: 16 (17 Nov 2023 06:00) (13 - 24)  SpO2: 97% (17 Nov 2023 06:00) (85% - 100%)    Parameters below as of 17 Nov 2023 05:22  Patient On (Oxygen Delivery Method): room air        MEDICATIONS  (STANDING):  enoxaparin Injectable 40 milliGRAM(s) SubCutaneous every 24 hours  influenza   Vaccine 0.5 milliLiter(s) IntraMuscular once  iohexol 300 mG (iodine)/mL Oral Solution 30 milliLiter(s) Oral once  lactated ringers 1000 milliLiter(s) (100 mL/Hr) IV Continuous <Continuous>  multivitamin/minerals 1 Tablet(s) Oral daily  pantoprazole  Injectable 40 milliGRAM(s) IV Push daily  potassium phosphate IVPB 15 milliMole(s) IV Intermittent once  thiamine IVPB 500 milliGRAM(s) IV Intermittent every 8 hours    MEDICATIONS  (PRN):  LORazepam   Injectable 2 milliGRAM(s) IV Push every 2 hours PRN CIWA > 8  LORazepam   Injectable 2 milliGRAM(s) IV Push every 1 hour PRN CIWA > 8  morphine  - Injectable 2 milliGRAM(s) IV Push every 6 hours PRN Severe Pain (7 - 10)  ondansetron Injectable 4 milliGRAM(s) IV Push every 8 hours PRN Nausea and/or Vomiting                            13.6   4.33  )-----------( 92       ( 17 Nov 2023 05:46 )             39.5       11-17    135  |  104  |  6<L>  ----------------------------<  90  3.5   |  22  |  0.66    Ca    6.9<L>      17 Nov 2023 05:46  Phos  2.6     11-17  Mg     2.5     11-17    TPro  7.0  /  Alb  2.8<L>  /  TBili  1.0  /  DBili  x   /  AST  45<H>  /  ALT  30  /  AlkPhos  106  11-17      PT/INR - ( 16 Nov 2023 05:28 )   PT: 14.2 sec;   INR: 1.25 ratio

## 2023-11-17 NOTE — PROGRESS NOTE ADULT - SUBJECTIVE AND OBJECTIVE BOX
INTERVAL HPI/OVERNIGHT EVENTS:  Seen and examined at bedside. No acute overnight events. Patient received 22 mg of Ativan overnight.     PRESSORS: [  ] YES [ X ] NO  WHICH:    Antimicrobial:    Cardiovascular:    Pulmonary:    Hematalogic:  enoxaparin Injectable 40 milliGRAM(s) SubCutaneous every 24 hours    Other:  dextrose 5% + sodium chloride 0.9%. 1000 milliLiter(s) IV Continuous <Continuous>  influenza   Vaccine 0.5 milliLiter(s) IntraMuscular once  iohexol 300 mG (iodine)/mL Oral Solution 30 milliLiter(s) Oral once  morphine  - Injectable 2 milliGRAM(s) IV Push every 6 hours PRN  multivitamin/minerals 1 Tablet(s) Oral daily  ondansetron Injectable 4 milliGRAM(s) IV Push every 8 hours PRN  pantoprazole  Injectable 40 milliGRAM(s) IV Push daily  thiamine IVPB 500 milliGRAM(s) IV Intermittent every 8 hours    dextrose 5% + sodium chloride 0.9%. 1000 milliLiter(s) IV Continuous <Continuous>  enoxaparin Injectable 40 milliGRAM(s) SubCutaneous every 24 hours  influenza   Vaccine 0.5 milliLiter(s) IntraMuscular once  iohexol 300 mG (iodine)/mL Oral Solution 30 milliLiter(s) Oral once  morphine  - Injectable 2 milliGRAM(s) IV Push every 6 hours PRN  multivitamin/minerals 1 Tablet(s) Oral daily  ondansetron Injectable 4 milliGRAM(s) IV Push every 8 hours PRN  pantoprazole  Injectable 40 milliGRAM(s) IV Push daily  thiamine IVPB 500 milliGRAM(s) IV Intermittent every 8 hours    Drug Dosing Weight  Height (cm): 162.6 (14 Nov 2023 10:46)  Weight (kg): 73.8 (16 Nov 2023 16:08)  BMI (kg/m2): 27.9 (16 Nov 2023 16:08)  BSA (m2): 1.79 (16 Nov 2023 16:08)    CENTRAL LINE: [ ] YES [ x] NO  LOCATION:   DATE INSERTED:  REMOVE: [ ] YES [ ] NO  EXPLAIN:    MERINO: [ ] YES [ x] NO    DATE INSERTED:  REMOVE:  [ ] YES [ ] NO  EXPLAIN:    A-LINE:  [ ] YES [ x] NO  LOCATION:   DATE INSERTED:  REMOVE:  [ ] YES [ ] NO  EXPLAIN:    PMH -reviewed admission note, no change since admission  PAST MEDICAL & SURGICAL HISTORY:  Alcohol abuse      HTN (hypertension)    History of seizure due to alcohol withdrawal    ICU Vital Signs Last 24 Hrs  T(C): 36.4 (17 Nov 2023 04:08), Max: 37.1 (16 Nov 2023 12:54)  T(F): 97.6 (17 Nov 2023 04:08), Max: 98.8 (16 Nov 2023 12:54)  HR: 82 (17 Nov 2023 12:00) (56 - 112)  BP: 106/79 (17 Nov 2023 12:00) (86/69 - 130/87)  BP(mean): 87 (17 Nov 2023 12:00) (73 - 108)  ABP: --  ABP(mean): --  RR: 15 (17 Nov 2023 12:00) (13 - 24)  SpO2: 100% (17 Nov 2023 12:00) (85% - 100%)    O2 Parameters below as of 17 Nov 2023 05:22  Patient On (Oxygen Delivery Method): room air    11-16 @ 07:01  -  11-17 @ 07:00  --------------------------------------------------------  IN: 1749.9 mL / OUT: 1000 mL / NET: 749.9 mL    PHYSICAL EXAM:    GENERAL: NAD  HEAD:  Atraumatic, Normocephalic  EYES: EOMI, pinpoint pupils, conjunctiva and sclera clear  ENMT: No tonsillar erythema, exudates, or enlargement; Moist mucous membranes, Good dentition, No lesions  NECK: Supple, normal appearance, No JVD; Normal thyroid; Trachea midline  NERVOUS SYSTEM:  patient sedated s/p Ativan overnight   CHEST/LUNG: No chest deformity; Normal percussion bilaterally; No rales, rhonchi, wheezing   HEART: Regular rate and rhythm; No murmurs, rubs, or gallops  ABDOMEN: Soft, Nontender, Nondistended; Bowel sounds present  EXTREMITIES:  2+ Peripheral Pulses, No clubbing, cyanosis, or edema  LYMPH: No lymphadenopathy noted  SKIN: No rashes or lesions;  Good capillary refill      LABS:  CBC Full  -  ( 17 Nov 2023 05:46 )  WBC Count : 4.33 K/uL  RBC Count : 4.50 M/uL  Hemoglobin : 13.6 g/dL  Hematocrit : 39.5 %  Platelet Count - Automated : 92 K/uL  Mean Cell Volume : 87.8 fl  Mean Cell Hemoglobin : 30.2 pg  Mean Cell Hemoglobin Concentration : 34.4 gm/dL  Auto Neutrophil # : 3.24 K/uL  Auto Lymphocyte # : 0.51 K/uL  Auto Monocyte # : 0.43 K/uL  Auto Eosinophil # : 0.10 K/uL  Auto Basophil # : 0.04 K/uL  Auto Neutrophil % : 74.9 %  Auto Lymphocyte % : 11.8 %  Auto Monocyte % : 9.9 %  Auto Eosinophil % : 2.3 %  Auto Basophil % : 0.9 %    11-17    135  |  104  |  6<L>  ----------------------------<  90  3.5   |  22  |  0.66    Ca    6.9<L>      17 Nov 2023 05:46  Phos  2.6     11-17  Mg     2.5     11-17    TPro  7.0  /  Alb  2.8<L>  /  TBili  1.0  /  DBili  x   /  AST  45<H>  /  ALT  30  /  AlkPhos  106  11-17    PT/INR - ( 16 Nov 2023 05:28 )   PT: 14.2 sec;   INR: 1.25 ratio           Urinalysis Basic - ( 17 Nov 2023 05:46 )    Color: x / Appearance: x / SG: x / pH: x  Gluc: 90 mg/dL / Ketone: x  / Bili: x / Urobili: x   Blood: x / Protein: x / Nitrite: x   Leuk Esterase: x / RBC: x / WBC x   Sq Epi: x / Non Sq Epi: x / Bacteria: x      Culture Results:   <10,000 CFU/mL Normal Urogenital Sandee (11-14 @ 20:25)      RADIOLOGY & ADDITIONAL STUDIES REVIEWED:  ***    [ ]GOALS OF CARE DISCUSSION WITH PATIENT/FAMILY/PROXY:    CRITICAL CARE TIME SPENT: 35 minutes

## 2023-11-17 NOTE — DIETITIAN INITIAL EVALUATION ADULT - ETIOLOGY
altered GI fx/ AMS as evidenced by NPO day #4 (of note: suspect malnutrition/ moderate 2/2 inadequate oral intake +? few days PTA w/ N/V/abdominal pain)

## 2023-11-17 NOTE — DIETITIAN INITIAL EVALUATION ADULT - PERTINENT MEDS FT
MEDICATIONS  (STANDING):  dextrose 5% + sodium chloride 0.9%. 1000 milliLiter(s) (100 mL/Hr) IV Continuous <Continuous>  enoxaparin Injectable 40 milliGRAM(s) SubCutaneous every 24 hours  influenza   Vaccine 0.5 milliLiter(s) IntraMuscular once  iohexol 300 mG (iodine)/mL Oral Solution 30 milliLiter(s) Oral once  multivitamin/minerals 1 Tablet(s) Oral daily  pantoprazole  Injectable 40 milliGRAM(s) IV Push daily  thiamine IVPB 500 milliGRAM(s) IV Intermittent every 8 hours    MEDICATIONS  (PRN):  morphine  - Injectable 2 milliGRAM(s) IV Push every 6 hours PRN Severe Pain (7 - 10)  ondansetron Injectable 4 milliGRAM(s) IV Push every 8 hours PRN Nausea and/or Vomiting

## 2023-11-17 NOTE — DIETITIAN INITIAL EVALUATION ADULT - OTHER INFO
Discussed on ICU IDR, pt NPO w/ ams/ sedated. Discussed in PM. PGY wants to maintain current clear liquid in case pt awakens and can take also pt w/ pancreatitis and PGY wants to establish tolerance to clears before further progression.

## 2023-11-18 LAB
ALBUMIN SERPL ELPH-MCNC: 2.6 G/DL — LOW (ref 3.5–5)
ALBUMIN SERPL ELPH-MCNC: 2.6 G/DL — LOW (ref 3.5–5)
ALP SERPL-CCNC: 117 U/L — SIGNIFICANT CHANGE UP (ref 40–120)
ALP SERPL-CCNC: 117 U/L — SIGNIFICANT CHANGE UP (ref 40–120)
ALT FLD-CCNC: 33 U/L DA — SIGNIFICANT CHANGE UP (ref 10–60)
ALT FLD-CCNC: 33 U/L DA — SIGNIFICANT CHANGE UP (ref 10–60)
ANION GAP SERPL CALC-SCNC: 6 MMOL/L — SIGNIFICANT CHANGE UP (ref 5–17)
ANION GAP SERPL CALC-SCNC: 6 MMOL/L — SIGNIFICANT CHANGE UP (ref 5–17)
AST SERPL-CCNC: 48 U/L — HIGH (ref 10–40)
AST SERPL-CCNC: 48 U/L — HIGH (ref 10–40)
BILIRUB SERPL-MCNC: 0.6 MG/DL — SIGNIFICANT CHANGE UP (ref 0.2–1.2)
BILIRUB SERPL-MCNC: 0.6 MG/DL — SIGNIFICANT CHANGE UP (ref 0.2–1.2)
BUN SERPL-MCNC: 5 MG/DL — LOW (ref 7–18)
BUN SERPL-MCNC: 5 MG/DL — LOW (ref 7–18)
CALCIUM SERPL-MCNC: 7.1 MG/DL — LOW (ref 8.4–10.5)
CALCIUM SERPL-MCNC: 7.1 MG/DL — LOW (ref 8.4–10.5)
CHLORIDE SERPL-SCNC: 107 MMOL/L — SIGNIFICANT CHANGE UP (ref 96–108)
CHLORIDE SERPL-SCNC: 107 MMOL/L — SIGNIFICANT CHANGE UP (ref 96–108)
CO2 SERPL-SCNC: 21 MMOL/L — LOW (ref 22–31)
CO2 SERPL-SCNC: 21 MMOL/L — LOW (ref 22–31)
CREAT SERPL-MCNC: 0.55 MG/DL — SIGNIFICANT CHANGE UP (ref 0.5–1.3)
CREAT SERPL-MCNC: 0.55 MG/DL — SIGNIFICANT CHANGE UP (ref 0.5–1.3)
EGFR: 128 ML/MIN/1.73M2 — SIGNIFICANT CHANGE UP
EGFR: 128 ML/MIN/1.73M2 — SIGNIFICANT CHANGE UP
GLUCOSE SERPL-MCNC: 136 MG/DL — HIGH (ref 70–99)
GLUCOSE SERPL-MCNC: 136 MG/DL — HIGH (ref 70–99)
HCT VFR BLD CALC: 40.8 % — SIGNIFICANT CHANGE UP (ref 39–50)
HCT VFR BLD CALC: 40.8 % — SIGNIFICANT CHANGE UP (ref 39–50)
HGB BLD-MCNC: 13.8 G/DL — SIGNIFICANT CHANGE UP (ref 13–17)
HGB BLD-MCNC: 13.8 G/DL — SIGNIFICANT CHANGE UP (ref 13–17)
MAGNESIUM SERPL-MCNC: 2.3 MG/DL — SIGNIFICANT CHANGE UP (ref 1.6–2.6)
MAGNESIUM SERPL-MCNC: 2.3 MG/DL — SIGNIFICANT CHANGE UP (ref 1.6–2.6)
MCHC RBC-ENTMCNC: 30.5 PG — SIGNIFICANT CHANGE UP (ref 27–34)
MCHC RBC-ENTMCNC: 30.5 PG — SIGNIFICANT CHANGE UP (ref 27–34)
MCHC RBC-ENTMCNC: 33.8 GM/DL — SIGNIFICANT CHANGE UP (ref 32–36)
MCHC RBC-ENTMCNC: 33.8 GM/DL — SIGNIFICANT CHANGE UP (ref 32–36)
MCV RBC AUTO: 90.3 FL — SIGNIFICANT CHANGE UP (ref 80–100)
MCV RBC AUTO: 90.3 FL — SIGNIFICANT CHANGE UP (ref 80–100)
NRBC # BLD: 0 /100 WBCS — SIGNIFICANT CHANGE UP (ref 0–0)
NRBC # BLD: 0 /100 WBCS — SIGNIFICANT CHANGE UP (ref 0–0)
PHOSPHATE SERPL-MCNC: 2 MG/DL — LOW (ref 2.5–4.5)
PHOSPHATE SERPL-MCNC: 2 MG/DL — LOW (ref 2.5–4.5)
PLATELET # BLD AUTO: 125 K/UL — LOW (ref 150–400)
PLATELET # BLD AUTO: 125 K/UL — LOW (ref 150–400)
POTASSIUM SERPL-MCNC: 3.4 MMOL/L — LOW (ref 3.5–5.3)
POTASSIUM SERPL-MCNC: 3.4 MMOL/L — LOW (ref 3.5–5.3)
POTASSIUM SERPL-SCNC: 3.4 MMOL/L — LOW (ref 3.5–5.3)
POTASSIUM SERPL-SCNC: 3.4 MMOL/L — LOW (ref 3.5–5.3)
PROT SERPL-MCNC: 6.6 G/DL — SIGNIFICANT CHANGE UP (ref 6–8.3)
PROT SERPL-MCNC: 6.6 G/DL — SIGNIFICANT CHANGE UP (ref 6–8.3)
RBC # BLD: 4.52 M/UL — SIGNIFICANT CHANGE UP (ref 4.2–5.8)
RBC # BLD: 4.52 M/UL — SIGNIFICANT CHANGE UP (ref 4.2–5.8)
RBC # FLD: 13.8 % — SIGNIFICANT CHANGE UP (ref 10.3–14.5)
RBC # FLD: 13.8 % — SIGNIFICANT CHANGE UP (ref 10.3–14.5)
SODIUM SERPL-SCNC: 134 MMOL/L — LOW (ref 135–145)
SODIUM SERPL-SCNC: 134 MMOL/L — LOW (ref 135–145)
WBC # BLD: 3.41 K/UL — LOW (ref 3.8–10.5)
WBC # BLD: 3.41 K/UL — LOW (ref 3.8–10.5)
WBC # FLD AUTO: 3.41 K/UL — LOW (ref 3.8–10.5)
WBC # FLD AUTO: 3.41 K/UL — LOW (ref 3.8–10.5)

## 2023-11-18 PROCEDURE — 99233 SBSQ HOSP IP/OBS HIGH 50: CPT | Mod: GC

## 2023-11-18 RX ORDER — ACETAMINOPHEN 500 MG
650 TABLET ORAL ONCE
Refills: 0 | Status: COMPLETED | OUTPATIENT
Start: 2023-11-18 | End: 2023-11-18

## 2023-11-18 RX ORDER — SODIUM,POTASSIUM PHOSPHATES 278-250MG
1 POWDER IN PACKET (EA) ORAL ONCE
Refills: 0 | Status: COMPLETED | OUTPATIENT
Start: 2023-11-18 | End: 2023-11-18

## 2023-11-18 RX ORDER — SODIUM CHLORIDE 9 MG/ML
1000 INJECTION, SOLUTION INTRAVENOUS
Refills: 0 | Status: DISCONTINUED | OUTPATIENT
Start: 2023-11-18 | End: 2023-11-19

## 2023-11-18 RX ORDER — POTASSIUM CHLORIDE 20 MEQ
40 PACKET (EA) ORAL ONCE
Refills: 0 | Status: COMPLETED | OUTPATIENT
Start: 2023-11-18 | End: 2023-11-18

## 2023-11-18 RX ORDER — PANTOPRAZOLE SODIUM 20 MG/1
40 TABLET, DELAYED RELEASE ORAL
Refills: 0 | Status: DISCONTINUED | OUTPATIENT
Start: 2023-11-18 | End: 2023-11-19

## 2023-11-18 RX ADMIN — Medication 105 MILLIGRAM(S): at 14:48

## 2023-11-18 RX ADMIN — Medication 650 MILLIGRAM(S): at 22:24

## 2023-11-18 RX ADMIN — PANTOPRAZOLE SODIUM 40 MILLIGRAM(S): 20 TABLET, DELAYED RELEASE ORAL at 17:58

## 2023-11-18 RX ADMIN — Medication 2 MILLIGRAM(S): at 16:32

## 2023-11-18 RX ADMIN — ENOXAPARIN SODIUM 40 MILLIGRAM(S): 100 INJECTION SUBCUTANEOUS at 18:41

## 2023-11-18 RX ADMIN — SODIUM CHLORIDE 100 MILLILITER(S): 9 INJECTION, SOLUTION INTRAVENOUS at 17:59

## 2023-11-18 RX ADMIN — Medication 1 TABLET(S): at 11:02

## 2023-11-18 RX ADMIN — PANTOPRAZOLE SODIUM 40 MILLIGRAM(S): 20 TABLET, DELAYED RELEASE ORAL at 11:02

## 2023-11-18 RX ADMIN — Medication 40 MILLIEQUIVALENT(S): at 05:15

## 2023-11-18 RX ADMIN — Medication 105 MILLIGRAM(S): at 05:53

## 2023-11-18 RX ADMIN — Medication 105 MILLIGRAM(S): at 21:38

## 2023-11-18 RX ADMIN — Medication 1 PACKET(S): at 05:15

## 2023-11-18 RX ADMIN — Medication 650 MILLIGRAM(S): at 21:38

## 2023-11-18 NOTE — CHART NOTE - NSCHARTNOTEFT_GEN_A_CORE
38 year old male w/ PMH HTN, EtOH use disorder w/ h/o EtOH-withdrawal seizures and ICU admission for the same who originally presented 10/14 with abd pain and admitted to medicine floor for EtOH and suspected EtOH pancreatitis. EtOH level at time of admission was notably 351. Patient was started on CIWA protocol w/ standing librium 50 q8h with PRN ativan. ICU consulted for persistently high CIWA and PRN requirements of ativan 14mg total since night prior.     In ICU Patient was loaded with phenobarbital but continue to have high CIWA scrores.  He required up to 22 mg of Ativan from 11/16 to 11/17.  Pt's withdrawal lessened in severity and he was able to be well managed with lorazepam pushes q4 prn.  Patient was provided with vitamin supplementation, including high dose thiamine (stop high dose 11/19), folate, and multivitamins. Patient was also placed on PPI. He is tolerating a regular diet and ambulating to the bathroom.    Patient is stable for downgrade and signed out to [        ] &   [        ].    For medicine team follow-up:  [ ] continue to taper lorazepam per CIWA scores  [ ] stop high dose thiamine on 11/19, continue routine supplementation  * hx hypertension, not currently on any medications 38 year old male w/ PMH HTN, EtOH use disorder w/ h/o EtOH-withdrawal seizures and ICU admission for the same who originally presented 10/14 with abd pain and admitted to medicine floor for EtOH and suspected EtOH pancreatitis. EtOH level at time of admission was notably 351. Patient was started on CIWA protocol w/ standing librium 50 q8h with PRN ativan. ICU consulted for persistently high CIWA and PRN requirements of ativan 14mg total since night prior.       In ICU Patient was loaded with phenobarbital but continue to have high CIWA scrores.  He required up to 22 mg of Ativan from 11/16 to 11/17.  Pt's withdrawal lessened in severity and he was able to be well managed with lorazepam pushes q4 prn.  Patient was provided with vitamin supplementation, including high dose thiamine (stop high dose 11/19), folate, and multivitamins. Patient was placed on PPI, his epigastric pain is now resolved, and he is afebrile, tolerating a regular diet and ambulating to the bathroom.      Patient is stable for downgrade and signed out to [        ] &   [        ].    For medicine team follow-up:  [ ] continue to taper lorazepam per CIWA scores  [ ] stop high dose thiamine on 11/19, continue routine supplementation  * hx hypertension, not currently on any medications 38 year old male w/ PMH HTN, EtOH use disorder w/ h/o EtOH-withdrawal seizures and ICU admission for the same who originally presented 10/14 with abd pain and admitted to medicine floor for EtOH and suspected EtOH pancreatitis. EtOH level at time of admission was notably 351. Patient was started on CIWA protocol w/ standing librium 50 q8h with PRN ativan. ICU consulted for persistently high CIWA and PRN requirements of ativan 14mg total since night prior.       In ICU Patient was loaded with phenobarbital but continue to have high CIWA scrores.  He required up to 22 mg of Ativan from 11/16 to 11/17.  Pt's withdrawal lessened in severity and he was able to be well managed with lorazepam pushes q4 prn.  Patient was provided with vitamin supplementation, including high dose thiamine (stop high dose 11/19), folate, and multivitamins. Patient was placed on PPI, his epigastric pain is now resolved, and he is afebrile, tolerating a regular diet and ambulating to the bathroom.  He received oral electrolyte supplementation this morning.    Patient is stable for downgrade and signed out to Dr. Burton &   [_____________, NP].    For medicine team follow-up:  [ ] continue to taper lorazepam per CIWA scores  [ ] stop high dose thiamine on 11/19, continue routine supplementation  * hx hypertension, not currently on any medications 38 year old male w/ PMH HTN, EtOH use disorder w/ h/o EtOH-withdrawal seizures and ICU admission for the same who originally presented 10/14 with abd pain and admitted to medicine floor for EtOH and suspected EtOH pancreatitis. EtOH level at time of admission was notably 351. Patient was started on CIWA protocol w/ standing librium 50 q8h with PRN ativan. ICU consulted for persistently high CIWA and PRN requirements of ativan 14mg total since night prior.       In ICU Patient was loaded with phenobarbital but continue to have high CIWA scrores.  He required up to 22 mg of Ativan from 11/16 to 11/17.  Pt's withdrawal lessened in severity and he was able to be well managed with lorazepam pushes q4 prn.  Patient was provided with vitamin supplementation, including high dose thiamine (stop high dose 11/19), folate, and multivitamins. Patient was placed on PPI, his epigastric pain is now resolved, and he is afebrile, tolerating a regular diet and ambulating to the bathroom.  He received oral electrolyte supplementation this morning.    Patient is stable for downgrade and signed out to Dr. Burton & MELINDA Varghese.    For medicine team follow-up:  [ ] continue to taper lorazepam per CIWA scores  [ ] stop high dose thiamine on 11/19, continue routine supplementation  * hx hypertension, not currently on any medications

## 2023-11-18 NOTE — CHART NOTE - NSCHARTNOTEFT_GEN_A_CORE
38 year old male w/ PMH HTN, EtOH use disorder w/ h/o EtOH-withdrawal seizures and ICU admission for the same who originally presented 10/14 with abd pain and admitted to medicine floor for EtOH and suspected EtOH pancreatitis. EtOH level at time of admission was notably 351. Patient was started on CIWA protocol w/ standing librium 50 q8h with PRN ativan. ICU consulted for persistently high CIWA and PRN requirements of ativan 14mg total since night prior.       In ICU Patient was loaded with phenobarbital but continue to have high CIWA scrores.  He required up to 22 mg of Ativan from 11/16 to 11/17.  Subsequently, withdrawal improved and patient was switched to lorazepam pushes q4 prn. On  high dose thiamine (stop high dose 11/19), folate, and multivitamins.      Patient downgraded to Medicine floor for further observation and management. Patient in no acute distress. No s/s of withdrawal at this time. Hemodynamically stable. Siting at the edge of bed. Somnolent, oriented to self, hospital and city. Confused to month (says March) and Year (says 2020), and president.  On RA. States he wants to go home today and needs to see his child. Patient also endorses that he feels unsteady when walking.     Assessment:   # Fall risk  # ETOH abuse   # Esophagitis  #Alcoholic pancreatitis  #thrombocytopenia     Plan:   - Fall precautions  -Likely 2/2 ETOH abuse vs medication induced.   - CIWA protocol, Thiamine, Folate (high dose until 11/19) and MVI.   - Seizure and fall precaution  - PPI  - CT A.P : Mild inflammatory changes in the fat adjacent to the pancreas may represent acute pancreatitis. GI on board.   - NPO   - IVF  - Continue to trend Plt daily. 38 year old male w/ PMH HTN, EtOH use disorder w/ h/o EtOH-withdrawal seizures and ICU admission for the same who originally presented 10/14 with abd pain and admitted to medicine floor for EtOH and suspected EtOH pancreatitis. EtOH level at time of admission was notably 351. Patient was started on CIWA protocol w/ standing librium 50 q8h with PRN ativan. ICU consulted for persistently high CIWA and PRN requirements of ativan 14mg total since night prior.       In ICU Patient was loaded with phenobarbital but continue to have high CIWA scrores.  He required up to 22 mg of Ativan from 11/16 to 11/17.  Subsequently, withdrawal improved and patient was switched to lorazepam pushes q4 prn. On  high dose thiamine (stop high dose 11/19), folate, and multivitamins.      Patient downgraded to Medicine floor for further observation and management. Patient in no acute distress. No s/s of withdrawal at this time. Hemodynamically stable. Siting at the edge of bed. Somnolent, oriented to self, hospital and city. Confused to month (says March) and Year (says 2020), and president.  On RA. States he wants to go home today and needs to see his child. Patient also endorses that he feels unsteady when walking.     Assessment:   # Fall risk  # ETOH abuse   # Esophagitis  #Alcoholic pancreatitis  #thrombocytopenia   #Epigastric and chest pain     Plan:   - Fall precautions  - CIWA protocol, Thiamine, Folate (high dose until 11/19) and MVI.   - Seizure and fall precaution  - PPI  - CT A/P Mild inflammatory changes in the fat adjacent to the pancreas may represent acute pancreatitis.   - GI Dr. Goyal on board.   - NPO   - IVF  - Continue to trend Plt daily.  - EKG noted. NSR. Likely dyspepsia in the setting of Esophagitis.   - PPI changed to BID  - DAYA Montanez.   Department of Internal Medicine  [facility]. 38 year old male w/ PMH HTN, EtOH use disorder w/ h/o EtOH-withdrawal seizures and ICU admission for the same who originally presented 10/14 with abd pain and admitted to medicine floor for EtOH and suspected EtOH pancreatitis. EtOH level at time of admission was notably 351. Patient was started on CIWA protocol w/ standing librium 50 q8h with PRN ativan. ICU consulted for persistently high CIWA and PRN requirements of ativan 14mg total since night prior.       In ICU Patient was loaded with phenobarbital but continue to have high CIWA scores.  He required up to 22 mg of Ativan from 11/16 to 11/17.  Subsequently, withdrawal improved and patient was switched to lorazepam pushes q4 prn. On  high dose thiamine (stop high dose 11/19), folate, and multivitamins.      Patient downgraded to Medicine floor for further observation and management. Patient c/o epigastric and non-radiating chest pain.  No s/s of acute withdrawal at this time. Hemodynamically stable. Siting at the edge of bed. Somnolent, poor concentration, oriented to self, hospital and city. Confused to month (says March) and Year (says 2020), and president.  On RA. Discussed with attending. Patient states he wants to go home today and needs to see his child. Patient also endorses that he feels unsteady when walking.     Assessment:   # Fall risk  # ETOH abuse   # Esophagitis  #Alcoholic pancreatitis  #thrombocytopenia   #Epigastric and chest pain     Plan:   - Fall precautions  - CIWA protocol, Thiamine, Folate (high dose until 11/19) and MVI.   - Seizure and fall precaution  - PPI  - CT A/P Mild inflammatory changes in the fat adjacent to the pancreas may represent acute pancreatitis.   - GI Dr. Goyal on board.   - NPO   - IVF  - Continue to trend Plt daily.  - EKG noted. NSR. Likely dyspepsia in the setting of Esophagitis.   - PPI changed to BID  - DAYA Montanez.   Department of Internal Medicine  [facility].

## 2023-11-18 NOTE — PROGRESS NOTE ADULT - SUBJECTIVE AND OBJECTIVE BOX
INTERVAL HPI/OVERNIGHT EVENTS: Asking for soup. No acute events overnight.     PRESSORS: [ ] YES [ ] NO    Antimicrobial:    Cardiovascular:    Pulmonary:    Hematalogic:  enoxaparin Injectable 40 milliGRAM(s) SubCutaneous every 24 hours    Other:  dextrose 5% + sodium chloride 0.9%. 1000 milliLiter(s) IV Continuous <Continuous>  influenza   Vaccine 0.5 milliLiter(s) IntraMuscular once  iohexol 300 mG (iodine)/mL Oral Solution 30 milliLiter(s) Oral once  LORazepam   Injectable 2 milliGRAM(s) IV Push every 1 hour PRN  morphine  - Injectable 2 milliGRAM(s) IV Push every 6 hours PRN  multivitamin/minerals 1 Tablet(s) Oral daily  ondansetron Injectable 4 milliGRAM(s) IV Push every 8 hours PRN  pantoprazole  Injectable 40 milliGRAM(s) IV Push daily  thiamine IVPB 500 milliGRAM(s) IV Intermittent every 8 hours    dextrose 5% + sodium chloride 0.9%. 1000 milliLiter(s) IV Continuous <Continuous>  enoxaparin Injectable 40 milliGRAM(s) SubCutaneous every 24 hours  influenza   Vaccine 0.5 milliLiter(s) IntraMuscular once  iohexol 300 mG (iodine)/mL Oral Solution 30 milliLiter(s) Oral once  LORazepam   Injectable 2 milliGRAM(s) IV Push every 1 hour PRN  morphine  - Injectable 2 milliGRAM(s) IV Push every 6 hours PRN  multivitamin/minerals 1 Tablet(s) Oral daily  ondansetron Injectable 4 milliGRAM(s) IV Push every 8 hours PRN  pantoprazole  Injectable 40 milliGRAM(s) IV Push daily  thiamine IVPB 500 milliGRAM(s) IV Intermittent every 8 hours    Drug Dosing Weight  Height (cm): 162.6 (14 Nov 2023 10:46)  Weight (kg): 73.8 (16 Nov 2023 16:08)  BMI (kg/m2): 27.9 (16 Nov 2023 16:08)  BSA (m2): 1.79 (16 Nov 2023 16:08)    CENTRAL LINE: [ ] YES [ ] NO  LOCATION:   DATE INSERTED:  REMOVE: [ ] YES [ ] NO  EXPLAIN:    MERINO: [ ] YES [ ] NO    DATE INSERTED:  REMOVE:  [ ] YES [ ] NO  EXPLAIN:    A-LINE:  [ ] YES [ ] NO  LOCATION:   DATE INSERTED:  REMOVE:  [ ] YES [ ] NO  EXPLAIN:    PMH -reviewed admission note, no change since admission            11-16 @ 07:01  -  11-17 @ 07:00  --------------------------------------------------------  IN: 1749.9 mL / OUT: 1000 mL / NET: 749.9 mL            PHYSICAL EXAM:    GENERAL: agitated, no tremors   HEAD:  Atraumatic, Normocephalic  EYES: conjunctiva and sclera clear  NERVOUS SYSTEM:  Alert & Oriented X 1, [place]  CHEST/LUNG: unable to access due to agitation   HEART: unable to access due to agitation   ABDOMEN: unable to access due to agitation   EXTREMITIES: unable to access due to agitation     LABS:  CBC Full  -  ( 17 Nov 2023 05:46 )  WBC Count : 4.33 K/uL  RBC Count : 4.50 M/uL  Hemoglobin : 13.6 g/dL  Hematocrit : 39.5 %  Platelet Count - Automated : 92 K/uL  Mean Cell Volume : 87.8 fl  Mean Cell Hemoglobin : 30.2 pg  Mean Cell Hemoglobin Concentration : 34.4 gm/dL  Auto Neutrophil # : 3.24 K/uL  Auto Lymphocyte # : 0.51 K/uL  Auto Monocyte # : 0.43 K/uL  Auto Eosinophil # : 0.10 K/uL  Auto Basophil # : 0.04 K/uL  Auto Neutrophil % : 74.9 %  Auto Lymphocyte % : 11.8 %  Auto Monocyte % : 9.9 %  Auto Eosinophil % : 2.3 %  Auto Basophil % : 0.9 %    11-17    135  |  104  |  6<L>  ----------------------------<  90  3.5   |  22  |  0.66    Ca    6.9<L>      17 Nov 2023 05:46  Phos  2.6     11-17  Mg     2.5     11-17    TPro  7.0  /  Alb  2.8<L>  /  TBili  1.0  /  DBili  x   /  AST  45<H>  /  ALT  30  /  AlkPhos  106  11-17    PT/INR - ( 16 Nov 2023 05:28 )   PT: 14.2 sec;   INR: 1.25 ratio           Urinalysis Basic - ( 17 Nov 2023 05:46 )    Color: x / Appearance: x / SG: x / pH: x  Gluc: 90 mg/dL / Ketone: x  / Bili: x / Urobili: x   Blood: x / Protein: x / Nitrite: x   Leuk Esterase: x / RBC: x / WBC x   Sq Epi: x / Non Sq Epi: x / Bacteria: x         INTERVAL HPI/OVERNIGHT EVENTS: Asking for soup. No acute events overnight.     PRESSORS: [ ] YES [ ] NO    Antimicrobial:    Cardiovascular:    Pulmonary:    Hematalogic:  enoxaparin Injectable 40 milliGRAM(s) SubCutaneous every 24 hours    Other:  dextrose 5% + sodium chloride 0.9%. 1000 milliLiter(s) IV Continuous <Continuous>  influenza   Vaccine 0.5 milliLiter(s) IntraMuscular once  iohexol 300 mG (iodine)/mL Oral Solution 30 milliLiter(s) Oral once  LORazepam   Injectable 2 milliGRAM(s) IV Push every 1 hour PRN  morphine  - Injectable 2 milliGRAM(s) IV Push every 6 hours PRN  multivitamin/minerals 1 Tablet(s) Oral daily  ondansetron Injectable 4 milliGRAM(s) IV Push every 8 hours PRN  pantoprazole  Injectable 40 milliGRAM(s) IV Push daily  thiamine IVPB 500 milliGRAM(s) IV Intermittent every 8 hours    dextrose 5% + sodium chloride 0.9%. 1000 milliLiter(s) IV Continuous <Continuous>  enoxaparin Injectable 40 milliGRAM(s) SubCutaneous every 24 hours  influenza   Vaccine 0.5 milliLiter(s) IntraMuscular once  iohexol 300 mG (iodine)/mL Oral Solution 30 milliLiter(s) Oral once  LORazepam   Injectable 2 milliGRAM(s) IV Push every 1 hour PRN  morphine  - Injectable 2 milliGRAM(s) IV Push every 6 hours PRN  multivitamin/minerals 1 Tablet(s) Oral daily  ondansetron Injectable 4 milliGRAM(s) IV Push every 8 hours PRN  pantoprazole  Injectable 40 milliGRAM(s) IV Push daily  thiamine IVPB 500 milliGRAM(s) IV Intermittent every 8 hours    Drug Dosing Weight  Height (cm): 162.6 (14 Nov 2023 10:46)  Weight (kg): 73.8 (16 Nov 2023 16:08)  BMI (kg/m2): 27.9 (16 Nov 2023 16:08)  BSA (m2): 1.79 (16 Nov 2023 16:08)    CENTRAL LINE: [ ] YES [ ] NO  LOCATION:   DATE INSERTED:  REMOVE: [ ] YES [ ] NO  EXPLAIN:    MERINO: [ ] YES [ ] NO    DATE INSERTED:  REMOVE:  [ ] YES [ ] NO  EXPLAIN:    A-LINE:  [ ] YES [ ] NO  LOCATION:   DATE INSERTED:  REMOVE:  [ ] YES [ ] NO  EXPLAIN:    PMH -reviewed admission note, no change since admission            11-16 @ 07:01  -  11-17 @ 07:00  --------------------------------------------------------  IN: 1749.9 mL / OUT: 1000 mL / NET: 749.9 mL            PHYSICAL EXAM:    GENERAL: seated upright in bed eating breakfast, no tremors noted  HEAD:  Atraumatic, Normocephalic  EYES: conjunctiva and sclera clear  NERVOUS SYSTEM:  Alert & Oriented X 3, full ROM and strength in all extremities, no tremors or diaphoresis noted  CHEST/LUNG: clear to auscultation bilaterally  HEART: S1S2, regular rate and rhythm, no murmurs, rubs, or gallops  ABDOMEN: soft non-distended, non-tender, +bowel sounds in all quadrants  EXTREMITIES: full ROM, no cyanosis, edema, or clubbing   SKIN: warm and dry, intact    LABS:  CBC Full  -  ( 17 Nov 2023 05:46 )  WBC Count : 4.33 K/uL  RBC Count : 4.50 M/uL  Hemoglobin : 13.6 g/dL  Hematocrit : 39.5 %  Platelet Count - Automated : 92 K/uL  Mean Cell Volume : 87.8 fl  Mean Cell Hemoglobin : 30.2 pg  Mean Cell Hemoglobin Concentration : 34.4 gm/dL  Auto Neutrophil # : 3.24 K/uL  Auto Lymphocyte # : 0.51 K/uL  Auto Monocyte # : 0.43 K/uL  Auto Eosinophil # : 0.10 K/uL  Auto Basophil # : 0.04 K/uL  Auto Neutrophil % : 74.9 %  Auto Lymphocyte % : 11.8 %  Auto Monocyte % : 9.9 %  Auto Eosinophil % : 2.3 %  Auto Basophil % : 0.9 %    11-17    135  |  104  |  6<L>  ----------------------------<  90  3.5   |  22  |  0.66    Ca    6.9<L>      17 Nov 2023 05:46  Phos  2.6     11-17  Mg     2.5     11-17    TPro  7.0  /  Alb  2.8<L>  /  TBili  1.0  /  DBili  x   /  AST  45<H>  /  ALT  30  /  AlkPhos  106  11-17    PT/INR - ( 16 Nov 2023 05:28 )   PT: 14.2 sec;   INR: 1.25 ratio           Urinalysis Basic - ( 17 Nov 2023 05:46 )    Color: x / Appearance: x / SG: x / pH: x  Gluc: 90 mg/dL / Ketone: x  / Bili: x / Urobili: x   Blood: x / Protein: x / Nitrite: x   Leuk Esterase: x / RBC: x / WBC x   Sq Epi: x / Non Sq Epi: x / Bacteria: x

## 2023-11-18 NOTE — PROGRESS NOTE ADULT - SUBJECTIVE AND OBJECTIVE BOX
[ X  ] ICU                                          [   ] CCU                                      [   ] Medical Floor    Patient is a 38 year old male with Abdominal pain. GI consulted to evaluate.           Patient is a 38 year old male from home, ambulates independently, with PMH of HTN, ETOH abuse, seizures 2/2 alcohol withdrawal earlier this year who presented to the ED with 4 days history of sharp constant 10/10 intensity epigastric abdominal pain radiating to his back associated with nausea and non bloody vomiting. Patient states he has been drinking heavily (14 cans of beers daily).  Patient denies hematemesis, hematochezia, melena, fever, chills. chest pain, SOB, cough, hematuria, dysuria or diarrhea.     Patient is comfortable. No new complaints reported, No abdominal pain, N/V, hematemesis, hematochezia, melena, fever, chills, chest pain, SOB, cough or diarrhea reported.      PAIN MANAGEMENT:  Pain Scale:                 10/10  Pain Location:  Epigastric abdominal pain       PAST MEDICAL HISTORY    ETOH abuse    HTN (hypertension)    Seizure disorder due to alcohol withdrawal        PAST SURGICAL HISTORY    No significant surgical history reported      Allergies    No Known Allergies    Intolerances  None          SOCIAL HISTORY  Advanced Directives:       [X  ] Full Code       [  ] DNR  Marital Status:         [  ] M      [ X ] S      [  ] D       [  ] W  Children:       [  X] Yes      [  ] No  Occupation:        [  ] Employed       [X  ] Unemployed       [  ] Retired  Diet:       [ X ] Regular       [  ] PEG feeding          [  ] NG tube feeding  Drug Use:           [ X ] Patient denied          [  ] Yes  Alcohol:           [  ] No             [  ] Yes (socially)         [ X ] Yes (chronic)  Tobacco:           [  ] Yes           [ X ] No      FAMILY HISTORY  [ X ] Heart Disease            [X  ] Diabetes             [ X ] HTN             [  ] Colon Cancer             [  ] Stomach Cancer              [  ] Pancreatic Cancer        VITALS       MEDICATIONS  (STANDING):  enoxaparin Injectable 40 milliGRAM(s) SubCutaneous every 24 hours  influenza   Vaccine 0.5 milliLiter(s) IntraMuscular once  multivitamin/minerals 1 Tablet(s) Oral daily  pantoprazole  Injectable 40 milliGRAM(s) IV Push daily  thiamine IVPB 500 milliGRAM(s) IV Intermittent every 8 hours    MEDICATIONS  (PRN):  LORazepam   Injectable 2 milliGRAM(s) IV Push every 1 hour PRN CIWA-Ar score 8 or greater  ondansetron Injectable 4 milliGRAM(s) IV Push every 8 hours PRN Nausea and/or Vomiting                            13.8   3.41  )-----------( 125      ( 18 Nov 2023 03:43 )             40.8       11-18    134<L>  |  107  |  5<L>  ----------------------------<  136<H>  3.4<L>   |  21<L>  |  0.55    Ca    7.1<L>      18 Nov 2023 03:43  Phos  2.0     11-18  Mg     2.3     11-18    TPro  6.6  /  Alb  2.6<L>  /  TBili  0.6  /  DBili  x   /  AST  48<H>  /  ALT  33  /  AlkPhos  117  11-18

## 2023-11-18 NOTE — PROGRESS NOTE ADULT - ASSESSMENT
seen and examined on bed comfortable on bed  denies abd pain, no nausea vomiting   abd soft nt nd   no tremers   labs noted   a/p DTs  now better not now on minimal ativan   abd soft bs nml  pancraetitis   improved  GI on board

## 2023-11-18 NOTE — PROGRESS NOTE ADULT - SUBJECTIVE AND OBJECTIVE BOX
HPI:  Patient is a 38 year old male from home, ambulates independently, with PMH of HTN, alcohol use disorder with history of seizures 2/2 alcohol withdrawal earlier this year who presented to the ED with abdominal pain.  History difficult to obtain due to patient vomiting.  patient states starting 4 days ago he had significant abdominal pain, nausea and vomiting.  patient states he has had similar episodes to this in the past and was told he had pancreatitis.  Patient has previoulsy been hospitalized for this in the past, most recently in [facility] earlier this year.  Patient states he is a heavy drinker and drinks 14 cans of beers daily.  patient states his last drink was this AM.  Patient states he has previoulsy been hospitalized for alcohol withdrawal and seizures 2/2 alcohol withdrawal however has never been intubated.  Patient states 7 months ago he was admitted to an ICU for seizures 2/2 alcohol withdrawal.  patient states the pain does not radiate to the back currently.  Patient states the pain is diffuse constant abdominal pain, worst in the epigastrium and after eating.  Patient denies nausea, vomiting, headache, blurry vision, dizziness, chest pain, abdominal pain, constipation, diarrhea, leg swelling.     Patient states he Has previously enrolled in detox.  Patient denies taking any medications at home (14 Nov 2023 19:34)      Patient is a 38y old  Male who presents with a chief complaint of Acute pancreatitis and alcohol withdrawal (18 Nov 2023 08:38)      INTERVAL HPI/OVERNIGHT EVENTS:  T(C): 36.6 (11-18-23 @ 13:47), Max: 36.6 (11-18-23 @ 13:47)  HR: 105 (11-18-23 @ 13:47) (74 - 105)  BP: 108/76 (11-18-23 @ 13:47) (88/69 - 110/82)  RR: 18 (11-18-23 @ 13:47) (13 - 22)  SpO2: 100% (11-18-23 @ 13:47) (92% - 100%)  Wt(kg): --  I&O's Summary    17 Nov 2023 07:01  -  18 Nov 2023 07:00  --------------------------------------------------------  IN: 3362.5 mL / OUT: 0 mL / NET: 3362.5 mL        REVIEW OF SYSTEMS: denies fever, chills, SOB, palpitations, chest pain, abdominal pain, nausea, vomitting, diarrhea, constipation, dizziness    MEDICATIONS  (STANDING):  enoxaparin Injectable 40 milliGRAM(s) SubCutaneous every 24 hours  influenza   Vaccine 0.5 milliLiter(s) IntraMuscular once  multivitamin/minerals 1 Tablet(s) Oral daily  pantoprazole  Injectable 40 milliGRAM(s) IV Push daily  thiamine IVPB 500 milliGRAM(s) IV Intermittent every 8 hours    MEDICATIONS  (PRN):  LORazepam   Injectable 2 milliGRAM(s) IV Push every 4 hours PRN CIWA > 8  ondansetron Injectable 4 milliGRAM(s) IV Push every 8 hours PRN Nausea and/or Vomiting      PHYSICAL EXAM:  GENERAL: NAD, well-groomed, well-developed  HEAD:  Atraumatic, Normocephalic  EYES: EOMI, PERRLA, conjunctiva and sclera clear  ENMT: No tonsillar erythema, exudates, or enlargement; Moist mucous membranes, Good dentition, No lesions  NECK: Supple, No JVD, Normal thyroid  NERVOUS SYSTEM:  Alert & Oriented X3, Good concentration; Motor Strength 5/5 B/L upper and lower extremities; DTRs 2+ intact and symmetric  CHEST/LUNG: Clear to percussion bilaterally; No rales, rhonchi, wheezing, or rubs  HEART: Regular rate and rhythm; No murmurs, rubs, or gallops  ABDOMEN: Soft, Nontender, Nondistended; Bowel sounds present  EXTREMITIES:  2+ Peripheral Pulses, No clubbing, cyanosis, or edema  LYMPH: No lymphadenopathy noted  SKIN: No rashes or lesions  LABS:                        13.8   3.41  )-----------( 125      ( 18 Nov 2023 03:43 )             40.8     11-18    134<L>  |  107  |  5<L>  ----------------------------<  136<H>  3.4<L>   |  21<L>  |  0.55    Ca    7.1<L>      18 Nov 2023 03:43  Phos  2.0     11-18  Mg     2.3     11-18    TPro  6.6  /  Alb  2.6<L>  /  TBili  0.6  /  DBili  x   /  AST  48<H>  /  ALT  33  /  AlkPhos  117  11-18      Urinalysis Basic - ( 18 Nov 2023 03:43 )    Color: x / Appearance: x / SG: x / pH: x  Gluc: 136 mg/dL / Ketone: x  / Bili: x / Urobili: x   Blood: x / Protein: x / Nitrite: x   Leuk Esterase: x / RBC: x / WBC x   Sq Epi: x / Non Sq Epi: x / Bacteria: x      CAPILLARY BLOOD GLUCOSE      POCT Blood Glucose.: 101 mg/dL (17 Nov 2023 17:56)        Urinalysis Basic - ( 18 Nov 2023 03:43 )    Color: x / Appearance: x / SG: x / pH: x  Gluc: 136 mg/dL / Ketone: x  / Bili: x / Urobili: x   Blood: x / Protein: x / Nitrite: x   Leuk Esterase: x / RBC: x / WBC x   Sq Epi: x / Non Sq Epi: x / Bacteria: x

## 2023-11-18 NOTE — PROGRESS NOTE ADULT - ASSESSMENT
1. Abdominal pain  2. Alcoholic pancreatitis  3. DT's  4. ETOH abuse  5. Thrombocytopenia (ETOH induced)  6. Steatohepatitis  7. Thickened wall of distal esophagus  8. Esophagitis    Suggestions:    1. NPO  2. IVF hydration  3. Protonix daily  4. Avoid NSAID  5. DT's precaution  6. Thiamine / Folate / MVI  7. Monitor electrolytes  8. Monitor platelet count  9. DVT prophylaxis

## 2023-11-18 NOTE — PROGRESS NOTE ADULT - ATTENDING COMMENTS
39yo man w/ PMH HTN, EtOH use disorder w/ h/o EtOH-withdrawal seizures and ICU admission for the same who originally presented 10/14 with abd pain and admitted to medicine floor for EtOH and suspected EtOH pancreatitis. EtOH level at time of admission was notably 351. Started on CIWA protocol w/ standing librium 50 q8h with PRN ativan. ICU consulted for persistently high CIWA and PRN requirements of ativan 14mg total since night prior.     #Severe EtOH withdrawal with hallucinations  #Alcoholic pancreatitis  #H/o HTN  #Thrombocytopenia - likely 2/2 chronic EtOH  #Hypokalemia    Recommendations:  - s/p 10mg/kg phenobarb  - overnight received additional ativan instead of more phenobarb  - CIWA monitoring  - hemodynamic monitoring  - seizure precautions given hx  - IVF hydration  - thiamine, folate, B12  - trend BMP/lytes and replete  - NPO for now  - DVT PPx
37yo man w/ PMH HTN, EtOH use disorder w/ h/o EtOH-withdrawal seizures and ICU admission for the same who originally presented 10/14 with abd pain and admitted to medicine floor for EtOH and suspected EtOH pancreatitis. EtOH level at time of admission was notably 351. Started on CIWA protocol w/ standing librium 50 q8h with PRN ativan. ICU consulted for persistently high CIWA and PRN requirements of ativan 14mg total since night prior.     #Severe EtOH withdrawal with hallucinations  #Alcoholic pancreatitis  #H/o HTN  #Thrombocytopenia - likely 2/2 chronic EtOH  #Hypokalemia    Recommendations:  - s/p 10mg/kg phenobarb; had received additional ativan 11/16->11/17 o/n, less 11/17-11/18 with overall improved sx  - CIWA monitoring, improved withdrawal sx  - hemodynamic monitoring  - seizure precautions given hx  - IVF hydration  - thiamine, folate, B12  - trend BMP/lytes and replete  - advance diet  - DVT PPx    Transfer to medicine

## 2023-11-18 NOTE — PROGRESS NOTE ADULT - ASSESSMENT
38 year old male  with PMH of HTN, alcohol use disorder with history of seizures 2/2 alcohol withdrawal earlier this year who presented to the ED with abdominal pain. Patient was admitted for alcohol withdrawal and acute pancreatitis Was started on Librium taper and required 15mg of ativan, with current CIWA 10. Patient admitted to ICU for further management.     =================== Neuro============================  # Alcohol Withdrawal  hx of seizures w/w alochol withdrawal  drink 12 cans of beer daily  Was started on Librium taper   Required 14 units of ativan  s/p Phenobarbital protocol   s/p Ativan 22mg total overnight   Sedated  Will watch of Ativan for now   CIWA  C/W thiamine   Folic acid  Multivitamin          ================= Cardiovascular==========================  # No issues      ================- Pulm=================================  # No issues     ==================ID===================================  # No issues     ================= Nephro================================  # Hypokalemia  Resolved  Monitor electrolytes   Supplement as needed    =================GI====================================  # Acute Pancreatitis  p/w abdominal pain, nausea, and vomiting  Lipase 620  CT A.P : Mild inflammatory changes in the fat adjacent to the pancreas may   represent acute pancreatitis  s/p IVF  Started on D5 and NS  Advance diet to clear liquid  Will keep monitoring for now     =============== Heme==================================  # Thrombocytopenia  Plts 138>97>92  likely 2/2 alcohol abuse  no signs of bleeding   continue to trend     =================Endocrine===============================  # No issues     ================= Skin/Catheters============================  Peripheral IV lines       =================Prophylaxis =============================  DVT prophylaxis Lovenox   GI prophylaxis     ==================GOC==================================  FULL CODE   Disposition : ICU 38 year old male  with PMH of HTN, alcohol use disorder with history of seizures 2/2 alcohol withdrawal earlier this year who presented to the ED with abdominal pain. Patient was admitted for alcohol withdrawal and acute pancreatitis Was started on Librium taper and required 15mg of ativan, with current CIWA 10. Patient admitted to ICU for further management.     =================== Neuro============================  # Alcohol Use Disorder, Alcohol Withdrawal Syndrome  -hx of alcohol use disorder with withdrawal seizures   - initially on librium taper and escalated to lorazepam, required 14mg with persistently high CIWA scores  - in ICU initiated Phenobarbital protocol but also required lorazepam 22mg total overnight   - now with decreasing CIWA scores and lorazepam requirement  - continue high dose thiamine 500mg TID until 11/19, then continue routine supplementation   - continue folic acid 1mg PO daily  - continue multivitamin PO daily         ================= Cardiovascular==========================  # No active issues  reports hx hypertension, but not on any meds at home  - monitor BPs, patient asymptomatic       ================- Pulm=================================  # No issues     ==================ID===================================  # No issues     ================= Nephro================================  # Hypokalemia  Resolved  Monitor electrolytes   Supplement as needed    =================GI====================================  # concern for acute pancreatitis   - p/w abdominal pain, nausea, and vomiting  - Lipase 620  - CT A.P : Mild inflammatory changes in the fat adjacent to the pancreas may represent acute pancreatitis  - status post IV hydration  - now with downtrending WBCs, afebrile, without abdominal pain  - tolerating regular diet  - (resolved)    =============== Heme==================================  # Thrombocytopenia likely 2/2 alcohol abuse  - Plts 138>97>92  - no signs of bleeding   - continue to trend     =================Endocrine===============================  # No issues     ================= Skin/Catheters============================  Peripheral IV lines       =================Prophylaxis =============================  DVT prophylaxis Lovenox   GI prophylaxis     ==================GOC==================================  FULL CODE   Disposition : Downgrade to medicine

## 2023-11-19 VITALS
TEMPERATURE: 98 F | SYSTOLIC BLOOD PRESSURE: 121 MMHG | HEART RATE: 98 BPM | RESPIRATION RATE: 18 BRPM | DIASTOLIC BLOOD PRESSURE: 80 MMHG | OXYGEN SATURATION: 96 %

## 2023-11-19 PROCEDURE — 99285 EMERGENCY DEPT VISIT HI MDM: CPT

## 2023-11-19 PROCEDURE — 81001 URINALYSIS AUTO W/SCOPE: CPT

## 2023-11-19 PROCEDURE — 87086 URINE CULTURE/COLONY COUNT: CPT

## 2023-11-19 PROCEDURE — 96375 TX/PRO/DX INJ NEW DRUG ADDON: CPT

## 2023-11-19 PROCEDURE — 84484 ASSAY OF TROPONIN QUANT: CPT

## 2023-11-19 PROCEDURE — 85027 COMPLETE CBC AUTOMATED: CPT

## 2023-11-19 PROCEDURE — 83690 ASSAY OF LIPASE: CPT

## 2023-11-19 PROCEDURE — 84100 ASSAY OF PHOSPHORUS: CPT

## 2023-11-19 PROCEDURE — 96376 TX/PRO/DX INJ SAME DRUG ADON: CPT

## 2023-11-19 PROCEDURE — 80053 COMPREHEN METABOLIC PANEL: CPT

## 2023-11-19 PROCEDURE — 83735 ASSAY OF MAGNESIUM: CPT

## 2023-11-19 PROCEDURE — 85025 COMPLETE CBC W/AUTO DIFF WBC: CPT

## 2023-11-19 PROCEDURE — 96372 THER/PROPH/DIAG INJ SC/IM: CPT

## 2023-11-19 PROCEDURE — 36415 COLL VENOUS BLD VENIPUNCTURE: CPT

## 2023-11-19 PROCEDURE — 87641 MR-STAPH DNA AMP PROBE: CPT

## 2023-11-19 PROCEDURE — 80307 DRUG TEST PRSMV CHEM ANLYZR: CPT

## 2023-11-19 PROCEDURE — 96374 THER/PROPH/DIAG INJ IV PUSH: CPT

## 2023-11-19 PROCEDURE — 74177 CT ABD & PELVIS W/CONTRAST: CPT | Mod: MA

## 2023-11-19 PROCEDURE — 93005 ELECTROCARDIOGRAM TRACING: CPT

## 2023-11-19 PROCEDURE — 86703 HIV-1/HIV-2 1 RESULT ANTBDY: CPT

## 2023-11-19 PROCEDURE — 87640 STAPH A DNA AMP PROBE: CPT

## 2023-11-19 PROCEDURE — 82962 GLUCOSE BLOOD TEST: CPT

## 2023-11-19 PROCEDURE — 84132 ASSAY OF SERUM POTASSIUM: CPT

## 2023-11-19 RX ORDER — MULTIVIT-MIN/FERROUS GLUCONATE 9 MG/15 ML
1 LIQUID (ML) ORAL
Qty: 0 | Refills: 0 | DISCHARGE
Start: 2023-11-19

## 2023-11-19 RX ADMIN — PANTOPRAZOLE SODIUM 40 MILLIGRAM(S): 20 TABLET, DELAYED RELEASE ORAL at 05:45

## 2023-11-19 RX ADMIN — Medication 105 MILLIGRAM(S): at 05:42

## 2023-11-19 NOTE — PROGRESS NOTE ADULT - SUBJECTIVE AND OBJECTIVE BOX
[   ] ICU                                          [   ] CCU                                      [ X  ] Medical Floor    Patient is a 38 year old male with Abdominal pain. GI consulted to evaluate.         Patient is a 38 year old male from home, ambulates independently, with PMH of HTN, ETOH abuse, seizures 2/2 alcohol withdrawal earlier this year who presented to the ED with 4 days history of sharp constant 10/10 intensity epigastric abdominal pain radiating to his back associated with nausea and non bloody vomiting. Patient states he has been drinking heavily (14 cans of beers daily).  Patient denies hematemesis, hematochezia, melena, fever, chills. chest pain, SOB, cough, hematuria, dysuria or diarrhea.     Patient is comfortable. No new complaints reported, No abdominal pain, N/V, hematemesis, hematochezia, melena, fever, chills, chest pain, SOB, cough or diarrhea reported.      PAIN MANAGEMENT:  Pain Scale:                 10/10  Pain Location:  Epigastric abdominal pain       PAST MEDICAL HISTORY    ETOH abuse    HTN (hypertension)    Seizure disorder due to alcohol withdrawal        PAST SURGICAL HISTORY    No significant surgical history reported      Allergies    No Known Allergies    Intolerances  None          SOCIAL HISTORY  Advanced Directives:       [X  ] Full Code       [  ] DNR  Marital Status:         [  ] M      [ X ] S      [  ] D       [  ] W  Children:       [  X] Yes      [  ] No  Occupation:        [  ] Employed       [X  ] Unemployed       [  ] Retired  Diet:       [ X ] Regular       [  ] PEG feeding          [  ] NG tube feeding  Drug Use:           [ X ] Patient denied          [  ] Yes  Alcohol:           [  ] No             [  ] Yes (socially)         [ X ] Yes (chronic)  Tobacco:           [  ] Yes           [ X ] No      FAMILY HISTORY  [ X ] Heart Disease            [X  ] Diabetes             [ X ] HTN             [  ] Colon Cancer             [  ] Stomach Cancer              [  ] Pancreatic Cancer        VITALS  Vital Signs Last 24 Hrs  T(C): 36.8 (19 Nov 2023 05:08), Max: 36.8 (18 Nov 2023 16:12)  T(F): 98.3 (19 Nov 2023 05:08), Max: 98.3 (19 Nov 2023 05:08)  HR: 85 (19 Nov 2023 05:08) (85 - 111)  BP: 129/87 (19 Nov 2023 05:08) (105/74 - 129/87)  BP(mean): 84 (18 Nov 2023 09:00) (84 - 84)  RR: 18 (19 Nov 2023 05:08) (18 - 22)  SpO2: 98% (19 Nov 2023 05:08) (96% - 100%)  Parameters below as of 19 Nov 2023 05:08  Patient On (Oxygen Delivery Method): room air         MEDICATIONS  (STANDING):  dextrose 5% + sodium chloride 0.9%. 1000 milliLiter(s) (100 mL/Hr) IV Continuous <Continuous>  enoxaparin Injectable 40 milliGRAM(s) SubCutaneous every 24 hours  influenza   Vaccine 0.5 milliLiter(s) IntraMuscular once  multivitamin/minerals 1 Tablet(s) Oral daily  pantoprazole  Injectable 40 milliGRAM(s) IV Push two times a day  thiamine IVPB 500 milliGRAM(s) IV Intermittent every 8 hours    MEDICATIONS  (PRN):  aluminum hydroxide/magnesium hydroxide/simethicone Suspension 30 milliLiter(s) Oral every 6 hours PRN Dyspepsia  LORazepam   Injectable 2 milliGRAM(s) IV Push every 4 hours PRN CIWA > 8  ondansetron Injectable 4 milliGRAM(s) IV Push every 8 hours PRN Nausea and/or Vomiting                            13.8   3.41  )-----------( 125      ( 18 Nov 2023 03:43 )             40.8       11-18    134<L>  |  107  |  5<L>  ----------------------------<  136<H>  3.4<L>   |  21<L>  |  0.55    Ca    7.1<L>      18 Nov 2023 03:43  Phos  2.0     11-18  Mg     2.3     11-18    TPro  6.6  /  Alb  2.6<L>  /  TBili  0.6  /  DBili  x   /  AST  48<H>  /  ALT  33  /  AlkPhos  117  11-18

## 2023-11-19 NOTE — PROGRESS NOTE ADULT - REASON FOR ADMISSION
Acute pancreatitis and alcohol withdrawal

## 2023-11-19 NOTE — DISCHARGE NOTE PROVIDER - HOSPITAL COURSE
38 year old male w/ PMH HTN, EtOH use disorder w/ h/o EtOH-withdrawal seizures and ICU admission for the same who originally presented 10/14 with abd pain and admitted to medicine floor for EtOH and suspected EtOH pancreatitis. EtOH level at time of admission was notably 351. Patient was started on CIWA protocol w/ standing librium 50 q8h with PRN ativan. ICU consulted for persistently high CIWA and PRN requirements of ativan 14mg total since night prior.  CT abdomen showed acute pancreatitis   In ICU Patient was loaded with phenobarbital but continue to have high CIWA scrores.  He required up to 22 mg of Ativan from 11/16 to 11/17.  Pt's withdrawal lessened in severity and he was able to be well managed with lorazepam pushes q4 prn.  Patient was provided with vitamin supplementation, including high dose thiamine (stop high dose 11/19), folate, and multivitamins. Patient was placed on PPI, his epigastric pain is now resolved, and he is afebrile, tolerating a regular diet     NOT COMPLETE 11/19  Please note that this a brief summary of hospital course please refer to daily progress notes and consult notes for full course and events 38 year old male w/ PMH HTN, EtOH use disorder w/ h/o EtOH-withdrawal seizures and ICU admission for the same who originally presented 10/14 with abd pain and admitted to medicine floor for EtOH and suspected EtOH pancreatitis. EtOH level at time of admission was notably 351. Patient was started on CIWA protocol w/ standing librium 50 q8h with PRN ativan. ICU consulted for persistently high CIWA and PRN requirements of ativan 14mg total since night prior.  CT abdomen showed acute pancreatitis   In ICU Patient was loaded with phenobarbital but continue to have high CIWA scrores.  He required up to 22 mg of Ativan from 11/16 to 11/17.  Pt's withdrawal lessened in severity and he was able to be well managed with lorazepam pushes q4 prn.  Patient was provided with vitamin supplementation, including high dose thiamine (stop high dose 11/19), folate, and multivitamins. Patient was placed on PPI, his epigastric pain is now resolved, and he is afebrile, tolerating a regular diet   Clinically improving, optimized for discharge with outpt follow up   Please note that this a brief summary of hospital course please refer to daily progress notes and consult notes for full course and events

## 2023-11-19 NOTE — PROGRESS NOTE ADULT - RESPIRATORY
clear to auscultation bilaterally/no wheezes/no rales/no rhonchi/no respiratory distress/no use of accessory muscles/breath sounds equal/good air movement/respirations non-labored

## 2023-11-19 NOTE — PROGRESS NOTE ADULT - NEGATIVE GASTROINTESTINAL SYMPTOMS
no diarrhea/no constipation/no melena/no hematochezia/no steatorrhea/no jaundice/no hiccoughs

## 2023-11-19 NOTE — PROGRESS NOTE ADULT - ASSESSMENT
1. Abdominal pain improving  2. Alcoholic pancreatitis  3. DT's  4. ETOH abuse  5. Thrombocytopenia (improving)  6. Steatohepatitis  7. Thickened wall of distal esophagus  8. Esophagitis    Suggestions:    1. Clear liquid diet  2. IVF hydration  3. Protonix daily  4. Avoid NSAID  5. DT's precaution  6. Thiamine / Folate / MVI  7. Monitor electrolytes  8. Monitor platelet count  9. DVT prophylaxis

## 2023-11-19 NOTE — PROGRESS NOTE ADULT - NEGATIVE RESPIRATORY AND THORAX SYMPTOMS
no wheezing/no dyspnea/no cough/no hemoptysis/no pleuritic chest pain

## 2023-11-19 NOTE — PROGRESS NOTE ADULT - EYES
PERRL/EOMI/conjunctiva clear/non icteric sclera

## 2023-11-19 NOTE — DISCHARGE NOTE PROVIDER - NSDCCPCAREPLAN_GEN_ALL_CORE_FT
PRINCIPAL DISCHARGE DIAGNOSIS  Diagnosis: Alcohol dependence with withdrawal  Assessment and Plan of Treatment: You were admitted to the hospital for alcohol withdrawal symptoms, you were started on medications to manage withdrawal symptoms.  Your symptoms remained stable   Continue medications as prescribed   Follow up with PCP and Psychiatry outpateint   In order to optimize your overall health and prevent adverse events, please abstain from ingesting alcohol. Continue to supplement with recommended vitamins and follow-up with your primary care provider for further medical care. It is highly recommended to attend AA meetings to help create a sober lifestyle. If you need immediate assistance with substance abuse you may contact the [facility] by calling [phone].  [facility]: [phone]. [facility] -[phone]        SECONDARY DISCHARGE DIAGNOSES  Diagnosis: Acute pancreatitis  Assessment and Plan of Treatment: You presented to hospital with abdominal pain which was likely due to Alcohol-induced pancreatitis which occurs in the setting of prolonged, chronic alcohol use. CT scan of abdomen was done and showed that you have acute pancreatitis. you were started on IV fluids, pain medications and bowel rest. your symptoms improved and you were started on oral food which you were able to tolerate.  If your pain starts again, you should start off with drinking only clear liquids, such as soup broth or gelatin. You will need to follow this diet until your symptoms get better. Slowly add other foods back to your diet when you are better.  -Eat a healthy diet that is low in fat  -Eat foods that are high in protein and carbohydrates, but low in fat. --Eat smaller meals, and eat more often.   -stop drinking alcohol   Managing Pain:   Avoiding alcohol and foods that make your symptoms worse is the first step to controlling pain.  -Use acetaminophen (Tylenol) or nonsteroidal anti-inflammatory drugs, such as ibuprofen (Advil, Motrin), at first to try and control your pain.  -seek medical attention if your symptoms do not improve or worsen on current treatment  Follow up with PCP within 1 week

## 2023-11-19 NOTE — PROGRESS NOTE ADULT - NEGATIVE MUSCULOSKELETAL SYMPTOMS
no stiffness/no neck pain/no arm pain L/no arm pain R/no back pain/no leg pain L/no leg pain R

## 2023-11-19 NOTE — PROGRESS NOTE ADULT - NEGATIVE GENERAL GENITOURINARY SYMPTOMS
no renal colic/no flank pain L/no flank pain R/no incontinence/no dysuria

## 2023-11-19 NOTE — PROGRESS NOTE ADULT - NEGATIVE ENMT SYMPTOMS
no hearing difficulty/no ear pain/no tinnitus/no vertigo/no sinus symptoms/no nasal congestion/no nasal discharge/no nasal obstruction/no nose bleeds/no gum bleeding/no dry mouth/no throat pain/no dysphagia

## 2023-11-19 NOTE — PROGRESS NOTE ADULT - ASSESSMENT
_________________________________________________________________________________________  ========>>  M E D I C A L   A T T E N D I N G    F O L L O W  U P  N O T E  <<=========  -----------------------------------------------------------------------------------------------------    - Patient seen and examined by me earlier today. (covering today)   - In summary,  PAN MCDERMOTT is a 38y year old man admitted with pancreatitis   - Patient today overall doing ok, comfortable, eating OK:: patient on regular diet now, ate ok this morning,, denies any pain       asking to go home..     ==================>> REVIEW OF SYSTEM <<=================    GEN: no fever, no chills, no pain  RESP: no SOB, no cough  CVS: no chest pain, no palpitations  GI: no abdominal pain, no nausea  : no dysuria, no frequency  Neuro: no headache, no dizziness    ==================>> PHYSICAL EXAM <<=================    GEN: A&O X 3 , NAD , comfortable, pleasant, calm   HEENT: NCAT, MMM, hearing intact  CVS: S1S2 , regular , No M/R/G appreciated  PULM: CTA B/L,  no W/R/R appreciated  ABD.: soft. non tender, non distended,  bowel sounds present  Extrem: intact pulses , no edema          ( Note written / Date of service 11-19-23 ( This is certified to be the same as "ENTERED" date above ( for billing purposes)))    ==================>> MEDICATIONS <<====================    MEDICATIONS  (STANDING):  dextrose 5% + sodium chloride 0.9%. 1000 milliLiter(s) (100 mL/Hr) IV Continuous <Continuous>  enoxaparin Injectable 40 milliGRAM(s) SubCutaneous every 24 hours  influenza   Vaccine 0.5 milliLiter(s) IntraMuscular once  multivitamin/minerals 1 Tablet(s) Oral daily  pantoprazole  Injectable 40 milliGRAM(s) IV Push two times a day  thiamine IVPB 500 milliGRAM(s) IV Intermittent every 8 hours    MEDICATIONS  (PRN):  aluminum hydroxide/magnesium hydroxide/simethicone Suspension 30 milliLiter(s) Oral every 6 hours PRN Dyspepsia  LORazepam   Injectable 2 milliGRAM(s) IV Push every 4 hours PRN CIWA > 8  ondansetron Injectable 4 milliGRAM(s) IV Push every 8 hours PRN Nausea and/or Vomiting    ___________  Active diet:  Diet, Regular  ___________________    ==================>> VITAL SIGNS <<==================    T(C): 36.8 (11-19-23 @ 05:08), Max: 36.8 (11-18-23 @ 16:12)  HR: 85 (11-19-23 @ 05:08) (85 - 111)  BP: 129/87 (11-19-23 @ 05:08) (107/54 - 129/87)  RR: 18 (11-19-23 @ 05:08) (18 - 18)  SpO2: 98% (11-19-23 @ 05:08) (98% - 100%)      ==================>> LAB AND IMAGING <<==================                        13.8   3.41  )-----------( 125      ( 18 Nov 2023 03:43 )             40.8        11-18    134<L>  |  107  |  5<L>  ----------------------------<  136<H>  3.4<L>   |  21<L>  |  0.55    Ca    7.1<L>      18 Nov 2023 03:43  Phos  2.0     11-18  Mg     2.3     11-18    TPro  6.6  /  Alb  2.6<L>  /  TBili  0.6  /  DBili  x   /  AST  48<H>  /  ALT  33  /  AlkPhos  117  11-18    Lipase:  620      Urinalysis Basic - ( 18 Nov 2023 03:43 )  Color: x / Appearance: x / SG: x / pH: x  Gluc: 136 mg/dL / Ketone: x  / Bili: x / Urobili: x   Blood: x / Protein: x / Nitrite: x   Leuk Esterase: x / RBC: x / WBC x   Sq Epi: x / Non Sq Epi: x / Bacteria: x    Culture - Urine (collected 14 Nov 2023 20:25)  Source: Clean Catch Clean Catch (Midstream)  Final Report (15 Nov 2023 23:07):    <10,000 CFU/mL Normal Urogenital Sandee      ___________________________________________________________________________________  ===============>>  A S S E S S M E N T   A N D   P L A N <<===============  ------------------------------------------------------------------------------------------    Patient is a 38 year old male from home, ambulates independently, with PMH of HTN, alcohol use disorder with history of seizures 2/2 alcohol withdrawal earlier this year who presented to the ED with abdominal pain.  History difficult to obtain due to patient vomiting.      patient overall appears clinically improved  patient started already on regular diet today  monitor for recurrence of abd pain   GI noted..  monitor vitals  labs  Continue Current medications otherwise and monitor.  supportive care  dc planing if cleared by GI..   SW follow up / alcohol cessation education    --------------------------------------------  Case discussed with patient ..   Education given on findings and plan of care  ___________________________  H. KEON Abel (covering today)   Pager: [phone]     _________________________________________________________________________________________  ========>>  M E D I C A L   A T T E N D I N G    F O L L O W  U P  N O T E  <<=========  -----------------------------------------------------------------------------------------------------    - Patient seen and examined by me earlier today. (covering today)   - In summary,  PAN MCDERMOTT is a 38y year old man admitted with pancreatitis   - Patient today overall doing ok, comfortable, eating OK:: patient on regular diet now, ate ok this morning,, denies any pain       asking to go home..     ==================>> REVIEW OF SYSTEM <<=================    GEN: no fever, no chills, no pain  RESP: no SOB, no cough  CVS: no chest pain, no palpitations  GI: no abdominal pain, no nausea  : no dysuria, no frequency  Neuro: no headache, no dizziness    ==================>> PHYSICAL EXAM <<=================    GEN: A&O X 2-3 , NAD , comfortable, pleasant, calm   HEENT: NCAT, MMM, hearing intact  CVS: S1S2 , regular , No M/R/G appreciated  PULM: CTA B/L,  no W/R/R appreciated  ABD.: soft. non tender, non distended,  bowel sounds present  Extrem: intact pulses , no edema          ( Note written / Date of service 11-19-23 ( This is certified to be the same as "ENTERED" date above ( for billing purposes)))    ==================>> MEDICATIONS <<====================    MEDICATIONS  (STANDING):  dextrose 5% + sodium chloride 0.9%. 1000 milliLiter(s) (100 mL/Hr) IV Continuous <Continuous>  enoxaparin Injectable 40 milliGRAM(s) SubCutaneous every 24 hours  influenza   Vaccine 0.5 milliLiter(s) IntraMuscular once  multivitamin/minerals 1 Tablet(s) Oral daily  pantoprazole  Injectable 40 milliGRAM(s) IV Push two times a day  thiamine IVPB 500 milliGRAM(s) IV Intermittent every 8 hours    MEDICATIONS  (PRN):  aluminum hydroxide/magnesium hydroxide/simethicone Suspension 30 milliLiter(s) Oral every 6 hours PRN Dyspepsia  LORazepam   Injectable 2 milliGRAM(s) IV Push every 4 hours PRN CIWA > 8  ondansetron Injectable 4 milliGRAM(s) IV Push every 8 hours PRN Nausea and/or Vomiting    ___________  Active diet:  Diet, Regular  ___________________    ==================>> VITAL SIGNS <<==================    T(C): 36.8 (11-19-23 @ 05:08), Max: 36.8 (11-18-23 @ 16:12)  HR: 85 (11-19-23 @ 05:08) (85 - 111)  BP: 129/87 (11-19-23 @ 05:08) (107/54 - 129/87)  RR: 18 (11-19-23 @ 05:08) (18 - 18)  SpO2: 98% (11-19-23 @ 05:08) (98% - 100%)      ==================>> LAB AND IMAGING <<==================                        13.8   3.41  )-----------( 125      ( 18 Nov 2023 03:43 )             40.8        11-18    134<L>  |  107  |  5<L>  ----------------------------<  136<H>  3.4<L>   |  21<L>  |  0.55    Ca    7.1<L>      18 Nov 2023 03:43  Phos  2.0     11-18  Mg     2.3     11-18    TPro  6.6  /  Alb  2.6<L>  /  TBili  0.6  /  DBili  x   /  AST  48<H>  /  ALT  33  /  AlkPhos  117  11-18    Lipase:  620      Urinalysis Basic - ( 18 Nov 2023 03:43 )  Color: x / Appearance: x / SG: x / pH: x  Gluc: 136 mg/dL / Ketone: x  / Bili: x / Urobili: x   Blood: x / Protein: x / Nitrite: x   Leuk Esterase: x / RBC: x / WBC x   Sq Epi: x / Non Sq Epi: x / Bacteria: x    Culture - Urine (collected 14 Nov 2023 20:25)  Source: Clean Catch Clean Catch (Midstream)  Final Report (15 Nov 2023 23:07):    <10,000 CFU/mL Normal Urogenital Sandee      ___________________________________________________________________________________  ===============>>  A S S E S S M E N T   A N D   P L A N <<===============  ------------------------------------------------------------------------------------------    Patient is a 38 year old male from home, ambulates independently, with PMH of HTN, alcohol use disorder with history of seizures 2/2 alcohol withdrawal earlier this year who presented to the ED with abdominal pain.  History difficult to obtain due to patient vomiting.      patient overall appears clinically improved  patient started already on regular diet today  monitor for recurrence of abd pain   GI noted..  monitor vitals  labs  Continue Current medications otherwise and monitor.  supportive care  dc planing if cleared by GI..   SW follow up / alcohol cessation education    --------------------------------------------  Case discussed with patient ..   Education given on findings and plan of care  ___________________________  H. KEON Abel (covering today)   Pager: [phone]

## 2023-11-19 NOTE — PROGRESS NOTE ADULT - NEGATIVE CARDIOVASCULAR SYMPTOMS
no chest pain/no palpitations/no dyspnea on exertion/no orthopnea/no paroxysmal nocturnal dyspnea

## 2023-11-19 NOTE — PROGRESS NOTE ADULT - NEGATIVE GENERAL SYMPTOMS
no fever/no chills/no sweating/no anorexia/no polyphagia/no polyuria/no polydipsia

## 2023-11-19 NOTE — PROGRESS NOTE ADULT - NECK
supple/symmetric/no tracheal deviation

## 2023-11-19 NOTE — DISCHARGE NOTE PROVIDER - CARE PROVIDER_API CALL
Koffi Veronica  Internal Medicine  [address] [place], [place] [address]  Phone: [phone]  Fax: [phone]  Follow Up Time: 1 week

## 2023-11-19 NOTE — DISCHARGE NOTE NURSING/CASE MANAGEMENT/SOCIAL WORK - NSDCPEFALRISK_GEN_ALL_CORE
For information on Fall & Injury Prevention, visit: [email or web address] OR  [email or web address] OR  https://www.cdc.gov/steadi/patient.html

## 2023-11-19 NOTE — DISCHARGE NOTE NURSING/CASE MANAGEMENT/SOCIAL WORK - PATIENT PORTAL LINK FT
You can access the FollowMyHealth Patient Portal offered by [facility] by registering at the following website: [email or web address]. By joining [facility]’s FollowMyHealth portal, you will also be able to view your health information using other applications (apps) compatible with our system.

## 2023-11-19 NOTE — PROGRESS NOTE ADULT - PROVIDER SPECIALTY LIST ADULT
Critical Care
Critical Care
Internal Medicine
Gastroenterology

## 2023-11-19 NOTE — PROGRESS NOTE ADULT - NEGATIVE NEUROLOGICAL SYMPTOMS
no syncope/no tremors/no vertigo/no loss of sensation/no headache

## 2023-11-19 NOTE — CHART NOTE - NSCHARTNOTEFT_GEN_A_CORE
Seen and examined pt with spouse at bedside asking to be discharged,  pt axo x3, denies any pain or discomfort. Diet advanced and tolerated. Pt last received ativan yesterday and has not required PRN ativan since yesterday. Hospital course, outpt follow up was reviewed. Alcohol cessation was reviewed at length, pt verbalized understanding. all questions answered.     D/C plan discussed with Dr. Abel

## 2024-03-13 ENCOUNTER — EMERGENCY (EMERGENCY)
Facility: HOSPITAL | Age: 39
LOS: 1 days | Discharge: ANOTHER HOSPITAL | End: 2024-03-13
Attending: EMERGENCY MEDICINE
Payer: SELF-PAY

## 2024-03-13 VITALS
SYSTOLIC BLOOD PRESSURE: 139 MMHG | WEIGHT: 149.91 LBS | RESPIRATION RATE: 20 BRPM | DIASTOLIC BLOOD PRESSURE: 90 MMHG | TEMPERATURE: 98 F | HEIGHT: 65 IN | HEART RATE: 117 BPM | OXYGEN SATURATION: 98 %

## 2024-03-13 LAB
ALBUMIN SERPL ELPH-MCNC: 3.7 G/DL — SIGNIFICANT CHANGE UP (ref 3.5–5)
ALP SERPL-CCNC: 120 U/L — SIGNIFICANT CHANGE UP (ref 40–120)
ALT FLD-CCNC: 114 U/L DA — HIGH (ref 10–60)
ANION GAP SERPL CALC-SCNC: 16 MMOL/L — SIGNIFICANT CHANGE UP (ref 5–17)
APTT BLD: 28.6 SEC — SIGNIFICANT CHANGE UP (ref 24.5–35.6)
AST SERPL-CCNC: 149 U/L — HIGH (ref 10–40)
BASOPHILS # BLD AUTO: 0.04 K/UL — SIGNIFICANT CHANGE UP (ref 0–0.2)
BASOPHILS NFR BLD AUTO: 0.3 % — SIGNIFICANT CHANGE UP (ref 0–2)
BILIRUB SERPL-MCNC: 1 MG/DL — SIGNIFICANT CHANGE UP (ref 0.2–1.2)
BUN SERPL-MCNC: 11 MG/DL — SIGNIFICANT CHANGE UP (ref 7–18)
CALCIUM SERPL-MCNC: 8 MG/DL — LOW (ref 8.4–10.5)
CHLORIDE SERPL-SCNC: 94 MMOL/L — LOW (ref 96–108)
CO2 SERPL-SCNC: 22 MMOL/L — SIGNIFICANT CHANGE UP (ref 22–31)
CREAT SERPL-MCNC: 0.86 MG/DL — SIGNIFICANT CHANGE UP (ref 0.5–1.3)
EGFR: 112 ML/MIN/1.73M2 — SIGNIFICANT CHANGE UP
EOSINOPHIL # BLD AUTO: 0.01 K/UL — SIGNIFICANT CHANGE UP (ref 0–0.5)
EOSINOPHIL NFR BLD AUTO: 0.1 % — SIGNIFICANT CHANGE UP (ref 0–6)
GAS PNL BLDV: SIGNIFICANT CHANGE UP
GLUCOSE SERPL-MCNC: 215 MG/DL — HIGH (ref 70–99)
HCT VFR BLD CALC: 50.1 % — HIGH (ref 39–50)
HGB BLD-MCNC: 18 G/DL — HIGH (ref 13–17)
IMM GRANULOCYTES NFR BLD AUTO: 0.3 % — SIGNIFICANT CHANGE UP (ref 0–0.9)
INR BLD: 1.33 RATIO — HIGH (ref 0.85–1.18)
LACTATE SERPL-SCNC: 6.6 MMOL/L — CRITICAL HIGH (ref 0.7–2)
LIDOCAIN IGE QN: 711 U/L — HIGH (ref 13–75)
LYMPHOCYTES # BLD AUTO: 1.07 K/UL — SIGNIFICANT CHANGE UP (ref 1–3.3)
LYMPHOCYTES # BLD AUTO: 8.2 % — LOW (ref 13–44)
MAGNESIUM SERPL-MCNC: 1.9 MG/DL — SIGNIFICANT CHANGE UP (ref 1.6–2.6)
MCHC RBC-ENTMCNC: 31.1 PG — SIGNIFICANT CHANGE UP (ref 27–34)
MCHC RBC-ENTMCNC: 35.9 GM/DL — SIGNIFICANT CHANGE UP (ref 32–36)
MCV RBC AUTO: 86.5 FL — SIGNIFICANT CHANGE UP (ref 80–100)
MONOCYTES # BLD AUTO: 0.73 K/UL — SIGNIFICANT CHANGE UP (ref 0–0.9)
MONOCYTES NFR BLD AUTO: 5.6 % — SIGNIFICANT CHANGE UP (ref 2–14)
NEUTROPHILS # BLD AUTO: 11.22 K/UL — HIGH (ref 1.8–7.4)
NEUTROPHILS NFR BLD AUTO: 85.5 % — HIGH (ref 43–77)
NRBC # BLD: 0 /100 WBCS — SIGNIFICANT CHANGE UP (ref 0–0)
PLATELET # BLD AUTO: 194 K/UL — SIGNIFICANT CHANGE UP (ref 150–400)
POTASSIUM SERPL-MCNC: 3.1 MMOL/L — LOW (ref 3.5–5.3)
POTASSIUM SERPL-SCNC: 3.1 MMOL/L — LOW (ref 3.5–5.3)
PROT SERPL-MCNC: 8 G/DL — SIGNIFICANT CHANGE UP (ref 6–8.3)
PROTHROM AB SERPL-ACNC: 15 SEC — HIGH (ref 9.5–13)
RBC # BLD: 5.79 M/UL — SIGNIFICANT CHANGE UP (ref 4.2–5.8)
RBC # FLD: 12.3 % — SIGNIFICANT CHANGE UP (ref 10.3–14.5)
SODIUM SERPL-SCNC: 132 MMOL/L — LOW (ref 135–145)
WBC # BLD: 13.11 K/UL — HIGH (ref 3.8–10.5)
WBC # FLD AUTO: 13.11 K/UL — HIGH (ref 3.8–10.5)

## 2024-03-13 PROCEDURE — 99292 CRITICAL CARE ADDL 30 MIN: CPT

## 2024-03-13 PROCEDURE — 99291 CRITICAL CARE FIRST HOUR: CPT

## 2024-03-13 PROCEDURE — 71045 X-RAY EXAM CHEST 1 VIEW: CPT | Mod: 26

## 2024-03-13 RX ORDER — PANTOPRAZOLE SODIUM 20 MG/1
40 TABLET, DELAYED RELEASE ORAL ONCE
Refills: 0 | Status: COMPLETED | OUTPATIENT
Start: 2024-03-13 | End: 2024-03-13

## 2024-03-13 RX ORDER — CEFTRIAXONE 500 MG/1
1000 INJECTION, POWDER, FOR SOLUTION INTRAMUSCULAR; INTRAVENOUS ONCE
Refills: 0 | Status: COMPLETED | OUTPATIENT
Start: 2024-03-13 | End: 2024-03-13

## 2024-03-13 RX ORDER — THIAMINE MONONITRATE (VIT B1) 100 MG
100 TABLET ORAL ONCE
Refills: 0 | Status: COMPLETED | OUTPATIENT
Start: 2024-03-13 | End: 2024-03-13

## 2024-03-13 RX ORDER — ONDANSETRON 8 MG/1
4 TABLET, FILM COATED ORAL ONCE
Refills: 0 | Status: COMPLETED | OUTPATIENT
Start: 2024-03-13 | End: 2024-03-13

## 2024-03-13 RX ORDER — SODIUM CHLORIDE 9 MG/ML
1000 INJECTION INTRAMUSCULAR; INTRAVENOUS; SUBCUTANEOUS ONCE
Refills: 0 | Status: COMPLETED | OUTPATIENT
Start: 2024-03-13 | End: 2024-03-13

## 2024-03-13 RX ADMIN — Medication 100 MILLIGRAM(S): at 22:57

## 2024-03-13 RX ADMIN — SODIUM CHLORIDE 1000 MILLILITER(S): 9 INJECTION INTRAMUSCULAR; INTRAVENOUS; SUBCUTANEOUS at 22:53

## 2024-03-13 RX ADMIN — PANTOPRAZOLE SODIUM 40 MILLIGRAM(S): 20 TABLET, DELAYED RELEASE ORAL at 22:54

## 2024-03-13 RX ADMIN — CEFTRIAXONE 100 MILLIGRAM(S): 500 INJECTION, POWDER, FOR SOLUTION INTRAMUSCULAR; INTRAVENOUS at 22:54

## 2024-03-13 RX ADMIN — Medication 2 MILLIGRAM(S): at 22:54

## 2024-03-13 RX ADMIN — ONDANSETRON 4 MILLIGRAM(S): 8 TABLET, FILM COATED ORAL at 22:57

## 2024-03-13 NOTE — ED ADULT NURSE NOTE - OBJECTIVE STATEMENT
BIBA with wife at bedside with c/o of vomiting blood s/p drinking alochol every day for 3 weeks. pt states that last drink was 3 weeks ago. Pt appears tremulous with c/o of ABD pain, N/V. .

## 2024-03-13 NOTE — ED ADULT NURSE NOTE - PAIN: BODY LOCATION
Zygomaticofacial Flap Text: Given the location of the defect, shape of the defect and the proximity to free margins a zygomaticofacial flap was deemed most appropriate for repair.  Using a sterile surgical marker, the appropriate flap was drawn incorporating the defect and placing the expected incisions within the relaxed skin tension lines where possible. The area thus outlined was incised deep to adipose tissue with a #15 scalpel blade with preservation of a vascular pedicle.  The skin margins were undermined to an appropriate distance in all directions utilizing iris scissors.  The flap was then placed into the defect and anchored with interrupted buried subcutaneous sutures. abdomen

## 2024-03-14 ENCOUNTER — INPATIENT (INPATIENT)
Facility: HOSPITAL | Age: 39
LOS: 4 days | Discharge: HOME | End: 2024-03-19
Attending: STUDENT IN AN ORGANIZED HEALTH CARE EDUCATION/TRAINING PROGRAM | Admitting: STUDENT IN AN ORGANIZED HEALTH CARE EDUCATION/TRAINING PROGRAM
Payer: MEDICAID

## 2024-03-14 VITALS
TEMPERATURE: 100 F | SYSTOLIC BLOOD PRESSURE: 152 MMHG | DIASTOLIC BLOOD PRESSURE: 101 MMHG | OXYGEN SATURATION: 96 % | HEART RATE: 118 BPM | RESPIRATION RATE: 21 BRPM

## 2024-03-14 VITALS
OXYGEN SATURATION: 96 % | HEART RATE: 120 BPM | RESPIRATION RATE: 18 BRPM | WEIGHT: 149.91 LBS | DIASTOLIC BLOOD PRESSURE: 83 MMHG | TEMPERATURE: 100 F | SYSTOLIC BLOOD PRESSURE: 127 MMHG | HEIGHT: 65 IN

## 2024-03-14 DIAGNOSIS — K85.90 ACUTE PANCREATITIS WITHOUT NECROSIS OR INFECTION, UNSPECIFIED: ICD-10-CM

## 2024-03-14 DIAGNOSIS — Z29.9 ENCOUNTER FOR PROPHYLACTIC MEASURES, UNSPECIFIED: ICD-10-CM

## 2024-03-14 DIAGNOSIS — F10.239 ALCOHOL DEPENDENCE WITH WITHDRAWAL, UNSPECIFIED: ICD-10-CM

## 2024-03-14 DIAGNOSIS — K92.0 HEMATEMESIS: ICD-10-CM

## 2024-03-14 DIAGNOSIS — E83.51 HYPOCALCEMIA: ICD-10-CM

## 2024-03-14 PROBLEM — Z87.898 PERSONAL HISTORY OF OTHER SPECIFIED CONDITIONS: Chronic | Status: ACTIVE | Noted: 2023-11-14

## 2024-03-14 PROBLEM — I10 ESSENTIAL (PRIMARY) HYPERTENSION: Chronic | Status: ACTIVE | Noted: 2023-11-14

## 2024-03-14 PROBLEM — F10.10 ALCOHOL ABUSE, UNCOMPLICATED: Chronic | Status: ACTIVE | Noted: 2023-11-14

## 2024-03-14 LAB
ALBUMIN SERPL ELPH-MCNC: 3.1 G/DL — LOW (ref 3.3–5)
ALBUMIN SERPL ELPH-MCNC: 3.6 G/DL — SIGNIFICANT CHANGE UP (ref 3.3–5)
ALP SERPL-CCNC: 100 U/L — SIGNIFICANT CHANGE UP (ref 40–120)
ALP SERPL-CCNC: 87 U/L — SIGNIFICANT CHANGE UP (ref 40–120)
ALT FLD-CCNC: 47 U/L — HIGH (ref 4–41)
ALT FLD-CCNC: 69 U/L — HIGH (ref 4–41)
ANION GAP SERPL CALC-SCNC: 14 MMOL/L — SIGNIFICANT CHANGE UP (ref 7–14)
ANION GAP SERPL CALC-SCNC: 20 MMOL/L — HIGH (ref 7–14)
APTT BLD: 28.8 SEC — SIGNIFICANT CHANGE UP (ref 24.5–35.6)
AST SERPL-CCNC: 51 U/L — HIGH (ref 4–40)
AST SERPL-CCNC: 88 U/L — HIGH (ref 4–40)
BASE EXCESS BLDV CALC-SCNC: -2 MMOL/L — SIGNIFICANT CHANGE UP (ref -2–3)
BASOPHILS # BLD AUTO: 0.02 K/UL — SIGNIFICANT CHANGE UP (ref 0–0.2)
BASOPHILS NFR BLD AUTO: 0.1 % — SIGNIFICANT CHANGE UP (ref 0–2)
BILIRUB SERPL-MCNC: 1 MG/DL — SIGNIFICANT CHANGE UP (ref 0.2–1.2)
BILIRUB SERPL-MCNC: 1.6 MG/DL — HIGH (ref 0.2–1.2)
BLD GP AB SCN SERPL QL: NEGATIVE — SIGNIFICANT CHANGE UP
BLD GP AB SCN SERPL QL: SIGNIFICANT CHANGE UP
BLOOD GAS VENOUS COMPREHENSIVE RESULT: SIGNIFICANT CHANGE UP
BLOOD GAS VENOUS COMPREHENSIVE RESULT: SIGNIFICANT CHANGE UP
BUN SERPL-MCNC: 6 MG/DL — LOW (ref 7–23)
BUN SERPL-MCNC: 8 MG/DL — SIGNIFICANT CHANGE UP (ref 7–23)
CALCIUM SERPL-MCNC: 6.4 MG/DL — CRITICAL LOW (ref 8.4–10.5)
CALCIUM SERPL-MCNC: 6.8 MG/DL — LOW (ref 8.4–10.5)
CHLORIDE BLDV-SCNC: 98 MMOL/L — SIGNIFICANT CHANGE UP (ref 96–108)
CHLORIDE SERPL-SCNC: 97 MMOL/L — LOW (ref 98–107)
CHLORIDE SERPL-SCNC: 99 MMOL/L — SIGNIFICANT CHANGE UP (ref 98–107)
CO2 BLDV-SCNC: 23.4 MMOL/L — SIGNIFICANT CHANGE UP (ref 22–26)
CO2 SERPL-SCNC: 20 MMOL/L — LOW (ref 22–31)
CO2 SERPL-SCNC: 21 MMOL/L — LOW (ref 22–31)
CREAT SERPL-MCNC: 0.56 MG/DL — SIGNIFICANT CHANGE UP (ref 0.5–1.3)
CREAT SERPL-MCNC: 0.63 MG/DL — SIGNIFICANT CHANGE UP (ref 0.5–1.3)
EGFR: 123 ML/MIN/1.73M2 — SIGNIFICANT CHANGE UP
EGFR: 128 ML/MIN/1.73M2 — SIGNIFICANT CHANGE UP
EOSINOPHIL # BLD AUTO: 0 K/UL — SIGNIFICANT CHANGE UP (ref 0–0.5)
EOSINOPHIL NFR BLD AUTO: 0 % — SIGNIFICANT CHANGE UP (ref 0–6)
ETHANOL SERPL-MCNC: 289 MG/DL — HIGH (ref 0–10)
GAS PNL BLDV: 134 MMOL/L — LOW (ref 136–145)
GLUCOSE BLDV-MCNC: 169 MG/DL — HIGH (ref 70–99)
GLUCOSE SERPL-MCNC: 112 MG/DL — HIGH (ref 70–99)
GLUCOSE SERPL-MCNC: 166 MG/DL — HIGH (ref 70–99)
HCO3 BLDV-SCNC: 22 MMOL/L — SIGNIFICANT CHANGE UP (ref 22–29)
HCT VFR BLD CALC: 44.2 % — SIGNIFICANT CHANGE UP (ref 39–50)
HCT VFR BLDA CALC: 47 % — SIGNIFICANT CHANGE UP (ref 39–51)
HGB BLD CALC-MCNC: 15.6 G/DL — SIGNIFICANT CHANGE UP (ref 12.6–17.4)
HGB BLD-MCNC: 15.9 G/DL — SIGNIFICANT CHANGE UP (ref 13–17)
IANC: 13.02 K/UL — HIGH (ref 1.8–7.4)
IMM GRANULOCYTES NFR BLD AUTO: 0.5 % — SIGNIFICANT CHANGE UP (ref 0–0.9)
INR BLD: 1.34 RATIO — HIGH (ref 0.85–1.18)
INR BLD: 1.4 RATIO — HIGH (ref 0.85–1.18)
LACTATE BLDV-MCNC: 5.3 MMOL/L — CRITICAL HIGH (ref 0.5–2)
LACTATE SERPL-SCNC: 5.3 MMOL/L — CRITICAL HIGH (ref 0.7–2)
LIDOCAIN IGE QN: 452 U/L — HIGH (ref 7–60)
LYMPHOCYTES # BLD AUTO: 0.82 K/UL — LOW (ref 1–3.3)
LYMPHOCYTES # BLD AUTO: 5.4 % — LOW (ref 13–44)
MAGNESIUM SERPL-MCNC: 1.3 MG/DL — LOW (ref 1.6–2.6)
MAGNESIUM SERPL-MCNC: 1.6 MG/DL — SIGNIFICANT CHANGE UP (ref 1.6–2.6)
MCHC RBC-ENTMCNC: 31.9 PG — SIGNIFICANT CHANGE UP (ref 27–34)
MCHC RBC-ENTMCNC: 36 GM/DL — SIGNIFICANT CHANGE UP (ref 32–36)
MCV RBC AUTO: 88.6 FL — SIGNIFICANT CHANGE UP (ref 80–100)
MONOCYTES # BLD AUTO: 1.13 K/UL — HIGH (ref 0–0.9)
MONOCYTES NFR BLD AUTO: 7.5 % — SIGNIFICANT CHANGE UP (ref 2–14)
NEUTROPHILS # BLD AUTO: 13.02 K/UL — HIGH (ref 1.8–7.4)
NEUTROPHILS NFR BLD AUTO: 86.5 % — HIGH (ref 43–77)
NRBC # BLD: 0 /100 WBCS — SIGNIFICANT CHANGE UP (ref 0–0)
NRBC # FLD: 0 K/UL — SIGNIFICANT CHANGE UP (ref 0–0)
OSMOLALITY SERPL: 358 MOSMOL/KG — HIGH (ref 275–300)
PCO2 BLDV: 36 MMHG — LOW (ref 42–55)
PH BLDV: 7.4 — SIGNIFICANT CHANGE UP (ref 7.32–7.43)
PHOSPHATE SERPL-MCNC: 1.3 MG/DL — LOW (ref 2.5–4.5)
PHOSPHATE SERPL-MCNC: 2.6 MG/DL — SIGNIFICANT CHANGE UP (ref 2.5–4.5)
PLATELET # BLD AUTO: 163 K/UL — SIGNIFICANT CHANGE UP (ref 150–400)
PO2 BLDV: 51 MMHG — HIGH (ref 25–45)
POTASSIUM BLDV-SCNC: 2.9 MMOL/L — CRITICAL LOW (ref 3.5–5.1)
POTASSIUM SERPL-MCNC: 3.1 MMOL/L — LOW (ref 3.5–5.3)
POTASSIUM SERPL-MCNC: 3.2 MMOL/L — LOW (ref 3.5–5.3)
POTASSIUM SERPL-SCNC: 3.1 MMOL/L — LOW (ref 3.5–5.3)
POTASSIUM SERPL-SCNC: 3.2 MMOL/L — LOW (ref 3.5–5.3)
PROT SERPL-MCNC: 6 G/DL — SIGNIFICANT CHANGE UP (ref 6–8.3)
PROT SERPL-MCNC: 6.6 G/DL — SIGNIFICANT CHANGE UP (ref 6–8.3)
PROTHROM AB SERPL-ACNC: 14.9 SEC — HIGH (ref 9.5–13)
PROTHROM AB SERPL-ACNC: 15.8 SEC — HIGH (ref 9.5–13)
RBC # BLD: 4.99 M/UL — SIGNIFICANT CHANGE UP (ref 4.2–5.8)
RBC # FLD: 13 % — SIGNIFICANT CHANGE UP (ref 10.3–14.5)
RH IG SCN BLD-IMP: POSITIVE — SIGNIFICANT CHANGE UP
SAO2 % BLDV: 81.5 % — SIGNIFICANT CHANGE UP (ref 67–88)
SODIUM SERPL-SCNC: 134 MMOL/L — LOW (ref 135–145)
SODIUM SERPL-SCNC: 137 MMOL/L — SIGNIFICANT CHANGE UP (ref 135–145)
WBC # BLD: 15.07 K/UL — HIGH (ref 3.8–10.5)
WBC # FLD AUTO: 15.07 K/UL — HIGH (ref 3.8–10.5)

## 2024-03-14 PROCEDURE — 99233 SBSQ HOSP IP/OBS HIGH 50: CPT | Mod: GC

## 2024-03-14 PROCEDURE — 86901 BLOOD TYPING SEROLOGIC RH(D): CPT

## 2024-03-14 PROCEDURE — 96374 THER/PROPH/DIAG INJ IV PUSH: CPT | Mod: XU

## 2024-03-14 PROCEDURE — 80307 DRUG TEST PRSMV CHEM ANLYZR: CPT

## 2024-03-14 PROCEDURE — 85025 COMPLETE CBC W/AUTO DIFF WBC: CPT

## 2024-03-14 PROCEDURE — 85730 THROMBOPLASTIN TIME PARTIAL: CPT

## 2024-03-14 PROCEDURE — 99291 CRITICAL CARE FIRST HOUR: CPT

## 2024-03-14 PROCEDURE — 99223 1ST HOSP IP/OBS HIGH 75: CPT | Mod: GC

## 2024-03-14 PROCEDURE — 86850 RBC ANTIBODY SCREEN: CPT

## 2024-03-14 PROCEDURE — 71045 X-RAY EXAM CHEST 1 VIEW: CPT

## 2024-03-14 PROCEDURE — 80053 COMPREHEN METABOLIC PANEL: CPT

## 2024-03-14 PROCEDURE — 85610 PROTHROMBIN TIME: CPT

## 2024-03-14 PROCEDURE — 71046 X-RAY EXAM CHEST 2 VIEWS: CPT | Mod: 26

## 2024-03-14 PROCEDURE — 74177 CT ABD & PELVIS W/CONTRAST: CPT | Mod: 26,MC

## 2024-03-14 PROCEDURE — 83930 ASSAY OF BLOOD OSMOLALITY: CPT

## 2024-03-14 PROCEDURE — 83735 ASSAY OF MAGNESIUM: CPT

## 2024-03-14 PROCEDURE — 36415 COLL VENOUS BLD VENIPUNCTURE: CPT

## 2024-03-14 PROCEDURE — 83605 ASSAY OF LACTIC ACID: CPT

## 2024-03-14 PROCEDURE — 74177 CT ABD & PELVIS W/CONTRAST: CPT | Mod: MC

## 2024-03-14 PROCEDURE — 82962 GLUCOSE BLOOD TEST: CPT

## 2024-03-14 PROCEDURE — 96361 HYDRATE IV INFUSION ADD-ON: CPT

## 2024-03-14 PROCEDURE — 96375 TX/PRO/DX INJ NEW DRUG ADDON: CPT

## 2024-03-14 PROCEDURE — 84132 ASSAY OF SERUM POTASSIUM: CPT

## 2024-03-14 PROCEDURE — 84295 ASSAY OF SERUM SODIUM: CPT

## 2024-03-14 PROCEDURE — 82803 BLOOD GASES ANY COMBINATION: CPT

## 2024-03-14 PROCEDURE — 93005 ELECTROCARDIOGRAM TRACING: CPT

## 2024-03-14 PROCEDURE — 83690 ASSAY OF LIPASE: CPT

## 2024-03-14 PROCEDURE — 96376 TX/PRO/DX INJ SAME DRUG ADON: CPT

## 2024-03-14 PROCEDURE — 99292 CRITICAL CARE ADDL 30 MIN: CPT | Mod: 25

## 2024-03-14 PROCEDURE — 86900 BLOOD TYPING SEROLOGIC ABO: CPT

## 2024-03-14 PROCEDURE — 82330 ASSAY OF CALCIUM: CPT

## 2024-03-14 PROCEDURE — T1013: CPT

## 2024-03-14 PROCEDURE — 99291 CRITICAL CARE FIRST HOUR: CPT | Mod: 25

## 2024-03-14 PROCEDURE — 84478 ASSAY OF TRIGLYCERIDES: CPT

## 2024-03-14 RX ORDER — MORPHINE SULFATE 50 MG/1
2 CAPSULE, EXTENDED RELEASE ORAL ONCE
Refills: 0 | Status: DISCONTINUED | OUTPATIENT
Start: 2024-03-14 | End: 2024-03-14

## 2024-03-14 RX ORDER — POTASSIUM CHLORIDE 20 MEQ
20 PACKET (EA) ORAL ONCE
Refills: 0 | Status: COMPLETED | OUTPATIENT
Start: 2024-03-14 | End: 2024-03-15

## 2024-03-14 RX ORDER — POTASSIUM CHLORIDE 20 MEQ
10 PACKET (EA) ORAL
Refills: 0 | Status: COMPLETED | OUTPATIENT
Start: 2024-03-14 | End: 2024-03-14

## 2024-03-14 RX ORDER — PANTOPRAZOLE SODIUM 20 MG/1
40 TABLET, DELAYED RELEASE ORAL ONCE
Refills: 0 | Status: COMPLETED | OUTPATIENT
Start: 2024-03-14 | End: 2024-03-14

## 2024-03-14 RX ORDER — SODIUM CHLORIDE 9 MG/ML
1000 INJECTION INTRAMUSCULAR; INTRAVENOUS; SUBCUTANEOUS ONCE
Refills: 0 | Status: COMPLETED | OUTPATIENT
Start: 2024-03-14 | End: 2024-03-14

## 2024-03-14 RX ORDER — FOLIC ACID 0.8 MG
1 TABLET ORAL DAILY
Refills: 0 | Status: DISCONTINUED | OUTPATIENT
Start: 2024-03-14 | End: 2024-03-19

## 2024-03-14 RX ORDER — MAGNESIUM SULFATE 500 MG/ML
1 VIAL (ML) INJECTION ONCE
Refills: 0 | Status: COMPLETED | OUTPATIENT
Start: 2024-03-14 | End: 2024-03-14

## 2024-03-14 RX ORDER — KETOROLAC TROMETHAMINE 30 MG/ML
10 SYRINGE (ML) INJECTION EVERY 6 HOURS
Refills: 0 | Status: DISCONTINUED | OUTPATIENT
Start: 2024-03-14 | End: 2024-03-14

## 2024-03-14 RX ORDER — MORPHINE SULFATE 50 MG/1
4 CAPSULE, EXTENDED RELEASE ORAL ONCE
Refills: 0 | Status: DISCONTINUED | OUTPATIENT
Start: 2024-03-14 | End: 2024-03-14

## 2024-03-14 RX ORDER — CALCIUM GLUCONATE 100 MG/ML
2 VIAL (ML) INTRAVENOUS ONCE
Refills: 0 | Status: COMPLETED | OUTPATIENT
Start: 2024-03-14 | End: 2024-03-15

## 2024-03-14 RX ORDER — PANTOPRAZOLE SODIUM 20 MG/1
40 TABLET, DELAYED RELEASE ORAL EVERY 12 HOURS
Refills: 0 | Status: DISCONTINUED | OUTPATIENT
Start: 2024-03-14 | End: 2024-03-19

## 2024-03-14 RX ORDER — SODIUM CHLORIDE 9 MG/ML
1000 INJECTION, SOLUTION INTRAVENOUS
Refills: 0 | Status: DISCONTINUED | OUTPATIENT
Start: 2024-03-14 | End: 2024-03-15

## 2024-03-14 RX ORDER — KETOROLAC TROMETHAMINE 30 MG/ML
15 SYRINGE (ML) INJECTION ONCE
Refills: 0 | Status: DISCONTINUED | OUTPATIENT
Start: 2024-03-14 | End: 2024-03-14

## 2024-03-14 RX ORDER — ACETAMINOPHEN 500 MG
1000 TABLET ORAL ONCE
Refills: 0 | Status: COMPLETED | OUTPATIENT
Start: 2024-03-14 | End: 2024-03-14

## 2024-03-14 RX ORDER — ACETAMINOPHEN 500 MG
650 TABLET ORAL EVERY 6 HOURS
Refills: 0 | Status: DISCONTINUED | OUTPATIENT
Start: 2024-03-14 | End: 2024-03-19

## 2024-03-14 RX ORDER — OXYCODONE HYDROCHLORIDE 5 MG/1
2.5 TABLET ORAL EVERY 6 HOURS
Refills: 0 | Status: DISCONTINUED | OUTPATIENT
Start: 2024-03-14 | End: 2024-03-16

## 2024-03-14 RX ORDER — THIAMINE MONONITRATE (VIT B1) 100 MG
500 TABLET ORAL EVERY 8 HOURS
Refills: 0 | Status: COMPLETED | OUTPATIENT
Start: 2024-03-14 | End: 2024-03-17

## 2024-03-14 RX ORDER — CALCIUM GLUCONATE 100 MG/ML
2 VIAL (ML) INTRAVENOUS ONCE
Refills: 0 | Status: COMPLETED | OUTPATIENT
Start: 2024-03-14 | End: 2024-03-14

## 2024-03-14 RX ORDER — POTASSIUM PHOSPHATE, MONOBASIC POTASSIUM PHOSPHATE, DIBASIC 236; 224 MG/ML; MG/ML
30 INJECTION, SOLUTION INTRAVENOUS ONCE
Refills: 0 | Status: COMPLETED | OUTPATIENT
Start: 2024-03-14 | End: 2024-03-15

## 2024-03-14 RX ADMIN — Medication 2 MILLIGRAM(S): at 16:40

## 2024-03-14 RX ADMIN — Medication 100 MILLIEQUIVALENT(S): at 09:25

## 2024-03-14 RX ADMIN — Medication 2 MILLIGRAM(S): at 11:55

## 2024-03-14 RX ADMIN — Medication 4 MILLIGRAM(S): at 13:04

## 2024-03-14 RX ADMIN — SODIUM CHLORIDE 1000 MILLILITER(S): 9 INJECTION INTRAMUSCULAR; INTRAVENOUS; SUBCUTANEOUS at 02:17

## 2024-03-14 RX ADMIN — SODIUM CHLORIDE 1000 MILLILITER(S): 9 INJECTION INTRAMUSCULAR; INTRAVENOUS; SUBCUTANEOUS at 02:30

## 2024-03-14 RX ADMIN — Medication 15 MILLIGRAM(S): at 17:36

## 2024-03-14 RX ADMIN — SODIUM CHLORIDE 1000 MILLILITER(S): 9 INJECTION INTRAMUSCULAR; INTRAVENOUS; SUBCUTANEOUS at 00:03

## 2024-03-14 RX ADMIN — PANTOPRAZOLE SODIUM 40 MILLIGRAM(S): 20 TABLET, DELAYED RELEASE ORAL at 09:25

## 2024-03-14 RX ADMIN — Medication 50 MILLIGRAM(S): at 05:48

## 2024-03-14 RX ADMIN — Medication 2 MILLIGRAM(S): at 02:17

## 2024-03-14 RX ADMIN — Medication 100 MILLIEQUIVALENT(S): at 11:54

## 2024-03-14 RX ADMIN — Medication 4 MILLIGRAM(S): at 09:25

## 2024-03-14 RX ADMIN — Medication 100 MILLIEQUIVALENT(S): at 10:41

## 2024-03-14 RX ADMIN — PANTOPRAZOLE SODIUM 40 MILLIGRAM(S): 20 TABLET, DELAYED RELEASE ORAL at 18:33

## 2024-03-14 RX ADMIN — MORPHINE SULFATE 2 MILLIGRAM(S): 50 CAPSULE, EXTENDED RELEASE ORAL at 08:10

## 2024-03-14 RX ADMIN — Medication 4 MILLIGRAM(S): at 22:52

## 2024-03-14 RX ADMIN — SODIUM CHLORIDE 1000 MILLILITER(S): 9 INJECTION INTRAMUSCULAR; INTRAVENOUS; SUBCUTANEOUS at 01:04

## 2024-03-14 RX ADMIN — Medication 400 MILLIGRAM(S): at 08:24

## 2024-03-14 RX ADMIN — Medication 2 MILLIGRAM(S): at 07:18

## 2024-03-14 RX ADMIN — SODIUM CHLORIDE 100 MILLILITER(S): 9 INJECTION, SOLUTION INTRAVENOUS at 17:40

## 2024-03-14 RX ADMIN — SODIUM CHLORIDE 1000 MILLILITER(S): 9 INJECTION INTRAMUSCULAR; INTRAVENOUS; SUBCUTANEOUS at 07:19

## 2024-03-14 RX ADMIN — MORPHINE SULFATE 4 MILLIGRAM(S): 50 CAPSULE, EXTENDED RELEASE ORAL at 01:30

## 2024-03-14 RX ADMIN — Medication 200 GRAM(S): at 09:25

## 2024-03-14 RX ADMIN — MORPHINE SULFATE 4 MILLIGRAM(S): 50 CAPSULE, EXTENDED RELEASE ORAL at 01:04

## 2024-03-14 RX ADMIN — Medication 4 MILLIGRAM(S): at 17:37

## 2024-03-14 RX ADMIN — Medication 2 MILLIGRAM(S): at 20:09

## 2024-03-14 RX ADMIN — Medication 2 MILLIGRAM(S): at 08:09

## 2024-03-14 RX ADMIN — Medication 105 MILLIGRAM(S): at 22:52

## 2024-03-14 RX ADMIN — SODIUM CHLORIDE 1000 MILLILITER(S): 9 INJECTION INTRAMUSCULAR; INTRAVENOUS; SUBCUTANEOUS at 11:55

## 2024-03-14 NOTE — H&P ADULT - ATTENDING COMMENTS
38 y.o male with alcohol abuse, hx of pancreatitis, presenting for hematemesis, abdominal pain. Vitals with tachycardia, Tmax 100.4, at . Patient oriented to person, date and hospital (does not know which hospital), Reports symptoms of withdrawal, tremors, abdominal pain. Exam with mild epigastric tenderness, mild tremors, RRR, CTA b/, Labs with elevated lipase, mild transaminitis, leukocytosis to 15, lactate 4.3, hypocalcemia 6.4. CXR personally reviewed no consolidations, no pneumothorax/effusion. CT A/P at  with probable acute interstitial pancreatitis, thickening of the distal esophagus. Treating for acute pancreatitis with IVFs, pain control, clear liquids, replete electrolytes PRN. Trend lactate, wbc, electrolytes. High risk alcohol withdrawal, ativan taper, CIWA monitoring. Encourage alcohol cessation. GI following for hematemesis, esophageal thickening, Hgb stable, c/w IV PPI BID, f.u GI recs in regards EGD.

## 2024-03-14 NOTE — ED PROVIDER NOTE - CARE PLAN
1 Principal Discharge DX:	Acute pancreatitis  Secondary Diagnosis:	Alcohol dependence with withdrawal  Secondary Diagnosis:	Moderate dehydration   Principal Discharge DX:	Acute pancreatitis  Secondary Diagnosis:	Alcohol dependence with withdrawal  Secondary Diagnosis:	Moderate dehydration  Secondary Diagnosis:	GI bleed

## 2024-03-14 NOTE — ED PROVIDER NOTE - OBJECTIVE STATEMENT
[id number]. 38-year-old male with history of alcohol abuse, pancreatitis, presents with wife with alcohol intoxication. He states that he has been drinking hard for the past 3 weeks, and then vomiting blood x 3 days. Associated black stool. Does not know if he has fever. Denies cough, constipation, urinary symptoms, testicular pain or swelling. 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

## 2024-03-14 NOTE — ED PROVIDER NOTE - HIV OFFER
Partially impaired: cannot see medication labels or newsprint, but can see obstacles in path, and the surrounding layout; can count fingers at arm's length Previously Negative (within the last year)

## 2024-03-14 NOTE — ED ADULT NURSE NOTE - NSFALLRISKINTERV_ED_ALL_ED
Assistance OOB with selected safe patient handling equipment if applicable/Assistance with ambulation/Communicate fall risk and risk factors to all staff, patient, and family/Monitor gait and stability/Provide visual cue: yellow wristband, yellow gown, etc/Reinforce activity limits and safety measures with patient and family/Call bell, personal items and telephone in reach/Instruct patient to call for assistance before getting out of bed/chair/stretcher/Non-slip footwear applied when patient is off stretcher/[place] to call system/Physically safe environment - no spills, clutter or unnecessary equipment/Purposeful Proactive Rounding/Room/bathroom lighting operational, light cord in reach

## 2024-03-14 NOTE — H&P ADULT - NSHPPHYSICALEXAM_GEN_ALL_CORE
LOS:     VITALS:   T(C): 37.5 (03-14-24 @ 09:58), Max: 38 (03-14-24 @ 07:25)  HR: 129 (03-14-24 @ 09:58) (120 - 139)  BP: 143/99 (03-14-24 @ 09:58) (127/83 - 143/99)  RR: 19 (03-14-24 @ 09:58) (18 - 19)  SpO2: 100% (03-14-24 @ 09:58) (96% - 100%)    GENERAL: NAD, lying in bed comfortably  HEAD:  Atraumatic, Normocephalic  EYES: EOMI, PERRLA, conjunctiva and sclera clear  ENT: Moist mucous membranes  NECK: Supple, No JVD  CHEST/LUNG: Clear to auscultation bilaterally; No rales, rhonchi, wheezing, or rubs. Unlabored respirations  HEART: Regular rate and rhythm; No murmurs, rubs, or gallops  ABDOMEN: BSx4; Soft, nontender, nondistended  EXTREMITIES:  2+ Peripheral Pulses, brisk capillary refill. No clubbing, cyanosis, or edema  NERVOUS SYSTEM:  A&Ox3, no focal deficits   SKIN: No rashes or lesions LOS:     VITALS:   T(C): 37.5 (03-14-24 @ 09:58), Max: 38 (03-14-24 @ 07:25)  HR: 129 (03-14-24 @ 09:58) (120 - 139)  BP: 143/99 (03-14-24 @ 09:58) (127/83 - 143/99)  RR: 19 (03-14-24 @ 09:58) (18 - 19)  SpO2: 100% (03-14-24 @ 09:58) (96% - 100%)    GENERAL: diaphoretic and lethargic   HEAD:  Atraumatic, Normocephalic  EYES: EOMI, PERRLA, conjunctiva and sclera clear  ENT: Dry mucous membranes  NECK: Supple, No JVD  CHEST/LUNG: Clear to auscultation bilaterally; No rales, rhonchi, wheezing, or rubs. Unlabored respirations  HEART: Regular rate and rhythm; No murmurs, rubs, or gallops  ABDOMEN: TTP RUQ and LUQ  EXTREMITIES:  2+ Peripheral Pulses, brisk capillary refill. No clubbing, cyanosis, or edema  NERVOUS SYSTEM:  A&Ox3, no focal deficits   SKIN: No rashes or lesions

## 2024-03-14 NOTE — H&P ADULT - PROBLEM SELECTOR PLAN 1
- pt with hx of EtOH use disorder  - CIWA >20 on admission with tachypnea, tachycardia, and anxiety  - s/p ativan 10mg in ED (ISHAANJ and FH)  - now admitted with withdrawal symptoms    PLAN  - on high risk CIWA taper with symptom triggered prn  - thiamine 500IV TID, folate, and multivitamin supplement  - SBIRT consult/recs - pt with hx of EtOH use disorder and elevated LEs  - CIWA >20 on admission with tachypnea, tachycardia, and anxiety  - s/p ativan 10mg in ED (LUIS and FH)  - now admitted with withdrawal symptoms    PLAN  - on high risk CIWA taper with symptom triggered prn  - thiamine 500IV TID, folate, and multivitamin supplement  - SBIRT consult/recs - pt with hx of EtOH use disorder and elevated LEs  - elevated lactate on admission   - CIWA >20 on admission with tachypnea, tachycardia, and anxiety  - s/p ativan 10mg in ED (LUIS and FH)  - now admitted with withdrawal symptoms    PLAN  - on high risk CIWA taper with symptom triggered prn  - thiamine 500IV TID, folate, and multivitamin supplement  - SBIRT consult/recs

## 2024-03-14 NOTE — ED PROVIDER NOTE - PHYSICAL EXAMINATION
CONSTITUTIONAL: Appears uncomfortable   HEAD: Normocephalic, no obvious signs of trauma  EENT: PERRL, EOMI, nares patent no drainage, no pharyngeal erythema, swelling, or exudates  NECK: Trachea midline, no goiter  RESP: L/S equal clr, bilat, apices and bases, no accessory muscle use, speaking full sentences  CARDIC: Tachycardic, regular rhythm, +S1/S2, no peripheral edema  GI: ABD soft, nondistended, diffusely tender, no palpable masses  : No CVA Tenderness  MSK: +5 strength extremities x 4, full ROM without pain, visibly tremulous w/ arms extended  NEURO: A&OX4, No focal motor deficits/weakness, no sensory deficits, no slurred speech, no facial droop

## 2024-03-14 NOTE — H&P ADULT - PROBLEM SELECTOR PLAN 2
- pt with also episodes of hematemesis   - HD and Hb stable (Hb elevated on admission, likely due to hemoconcentration)  - likely 2/2 PUD vs gastropathy vs esophagitis   - no hx of cirrhosis nor varices     PLAN  - keep NPO  - PPI IV BID  - maintain active T/S  - transfuse for  Hb>7  - appreciate GI recs--> consider scope when withdrawal symptoms improve

## 2024-03-14 NOTE — H&P ADULT - PROBLEM SELECTOR PLAN 3
- pt with abdominal pain and hx of pancreatitis  - lipase elevated and CT A/P with findings of pancreatitis  - 2/2 alcohol use disorder    PLAN  - maintain NPO and advance diet as tolerated  - fluids - pt with abdominal pain and hx of pancreatitis  - lipase elevated and CT A/P with findings of pancreatitis--> meets criteria   - 2/2 alcohol use disorder    PLAN  - maintain NPO and advance diet as tolerated  - fluids

## 2024-03-14 NOTE — ED ADULT NURSE NOTE - CHIEF COMPLAINT QUOTE
Pt arrives as GI transfer from [place].  Pt has GI bleed and is withdrawing from ETOH experiencing N/V with report of bloody emesis and black stool.  Pt appears to have tremors with hands extended.  Last CIWA reported at 02:15 = 9). Last drink approximately 18:00.  Pt A&Ox3, Pt received 4mg Morphine @01:04; received 4mg Ativan total with last dose at 02:17; received Ceftriaxone @22:00;  2 Liters fluid due to lactate of 6.6.  Rt AC 20g

## 2024-03-14 NOTE — CONSULT NOTE ADULT - NS ATTEND AMEND GEN_ALL_CORE FT
38-year-old male with HTN/alcohol use disorder/history of alcohol withdrawal 11/2023 with acute pancreatitis at that time, presents as a transfer from [place] for management of alcohol withdrawal and episodes of bloody emesis, dark stools, had 20 beers yesterday, epigastric pain for the past 2 days, 1 episode of coffee-ground emesis  Imaging consistent with interstitial pancreatitis, possible early necrosis  Thickening of the distal esophagus with air-fluid level within the esophagus  Currently febrile 100.4 with tachycardia and leukocytosis, hemoglobin 15.9    Impression  Acute interstitial pancreatitis with possible complication  No evidence of portal hypertension/cirrhosis on labs, likely thickening is secondary to esophagitis with potential Kiana-Ramos tear given active drinking  Alcohol use disorder  Low Maddrey function    Recommendation  1.  PPI twice daily  2.  CIWA protocol  3.  IV fluid hydration at 150 cc an hour LR  4.  Trend LFTs/INR  5.  Will defer EGD as no significant bleeding objectively, patient with fever and withdrawal

## 2024-03-14 NOTE — H&P ADULT - TIME BILLING
Time-based billing (NON-critical care).     80 minutes spent on total encounter; more than 50% of the visit was spent counseling and / or coordinating care by the attending physician.  The necessity of the time spent during the encounter on this date of service was due to:     documentation in [place], reviewing chart and coordinating care with patient/residents and interdisciplinary staff (such as , social workers, etc) as well as reviewing vitals, laboratory data, radiology, medication list, consultants' recommendations and prior records. Interventions were performed as documented above.

## 2024-03-14 NOTE — CONSULT NOTE ADULT - ASSESSMENT
37 y/o M with PMH of HTN (not on medication), alcohol use disorder, ICU admission at [facility] in November 2023 for alcohol withdrawal and acute pancreatitis who presents as a transfer from [facility] for management of alcohol withdrawal, bloody emesis and dark stools. Imaging at [facility] showing thickening of distal esophagus w/ air fluid level within esophagus and 3.9 x 1.9 cm focus of hypoattenuation w/in pancreatic tail w/ inflammatory changes and small amount of fluid in adjacent fat- suspicious for acute interstitial pancreatitis with possible developing necrosis, cannot exclude underlying mass. GI consulted for further evaluation of cge and pancreatitis.    Currently febrile to 100.4, hr 139 bp, 134/100. Received 3L IVF total. Labs: wbc 15k, hgb 18-- 15.9, plts 163k, inr 1.34, k 3.1, ag 20, ca 6.3, lipase 711-- 452, ast 149-- 88, alt 114- 69, tg 450, lactate 5.3, bal 289, ciwa 8-9    # reports of CGE w/ imaging showing thickening of distal esophagus w/ air fluid level within esophagus  # acute pancreatitis, cannot exclude necrosis or underlying mass  # elevated lfts likely 2/2 etoh  # active etoh withdrawal   # fever/tachycardia/leukocytosis/lactic acidosis       Recommendations-     39 y/o M with PMH of HTN (not on medication), alcohol use disorder, ICU admission at [facility] in November 2023 for alcohol withdrawal and acute pancreatitis who presents as a transfer from [facility] for management of alcohol withdrawal, bloody emesis and dark stools. Imaging at [facility] showing thickening of distal esophagus w/ air fluid level within esophagus, liver normal appearing, and 3.9 x 1.9 cm focus of hypoattenuation w/in pancreatic tail w/ inflammatory changes and small amount of fluid in adjacent fat- suspicious for acute interstitial pancreatitis with possible developing necrosis, PD not seen, bile ducts normal, cannot exclude underlying mass. GI consulted for further evaluation of cge and pancreatitis.    Currently febrile to 100.4, hr 139 bp, 134/100. Received 3L IVF total. Labs: wbc 15k, hgb 18-- 15.9, plts 163k, inr 1.34, k 3.1, ag 20, ca 6.3, lipase 711-- 452, ast 149-- 88, alt 114- 69, tg 450, lactate 5.3, bal 289, ciwa 8-9    # CGE  - limited hx but reports dark colored emesis, denies sarabjit hematemesis, hh stable, no evidence of portal htn/cirrhosis on labs/imaging, favor 2/2 esophagitis as seen on imaging (addtl ddx includes mwt, gastropathy, pud, etc)  # acute pancreatitis, cannot exclude necrosis or underlying mass  - likely 2/2 etoh +/- component of hypertriglyceridemia   # elevated lfts likely 2/2 etoh  # active etoh withdrawal   # fever/tachycardia/leukocytosis/lactic acidosis     Recommendations-  - f/u MICU recommendations  - CIWA protocol as per primary  - NPO, sp 3L IVF, pls start mIVF  - high dose IV PPI; avoid nsaids  - maintain active t&s and 2 large bore IV access  - trend cbc, transfuse for hgb >/=7  - trend lactate, lfts/inr  - can plan for EGD to further evaluate symptoms/imaging findings when no longer withdrawing and medically optimized (or sooner if develops overt gib)  - when feasible obtain MRCP to further evaluate pancreas  - rest of care as per primary team    *all recommendations are tentative until note is attested by attending*    JACOB Saavedra PA-C, RD, CDN  available on TEAMS M/T/TH/F from 7am - 4pm    after hours/weekends: non urgent issues --> email [email or web address], urgent issues --> contact on-call GI fellow at [phone]    39 y/o M with PMH of HTN (not on medication), alcohol use disorder, ICU admission at [facility] in November 2023 for alcohol withdrawal and acute pancreatitis who presents as a transfer from [facility] for management of alcohol withdrawal, bloody emesis and dark stools. Imaging at [facility] showing thickening of distal esophagus w/ air fluid level within esophagus, liver normal appearing, and 3.9 x 1.9 cm focus of hypoattenuation w/in pancreatic tail w/ inflammatory changes and small amount of fluid in adjacent fat- suspicious for acute interstitial pancreatitis with possible developing necrosis, PD not seen, bile ducts normal, cannot exclude underlying mass. GI consulted for further evaluation of cge and pancreatitis.    Currently febrile to 100.4, hr 139 bp, 134/100. Received 3L IVF total. Labs: wbc 15k, hgb 18-- 15.9, plts 163k, inr 1.34, k 3.1, ag 20, ca 6.3, lipase 711-- 452, ast 149-- 88, alt 114- 69, tg 450, lactate 5.3, bal 289, ciwa 8-9    # CGE  - limited hx but reports dark colored emesis, denies sarabjit hematemesis, hh stable, no evidence of portal htn/cirrhosis on labs/imaging, favor 2/2 esophagitis as seen on imaging (addtl ddx includes mwt, gastropathy, pud, etc)  # acute pancreatitis, cannot exclude necrosis or underlying mass  - likely 2/2 etoh +/- component of hypertriglyceridemia   # elevated lfts likely 2/2 etoh  # active etoh withdrawal   # fever/tachycardia/leukocytosis/lactic acidosis     Recommendations-  - f/u MICU recommendations  - CIWA protocol as per primary  - NPO, sp 3L IVF, pls start mIVF  - high dose IV PPI; avoid nsaids  - maintain active t&s and 2 large bore IV access  - trend cbc, transfuse for hgb >/=7  - trend lactate, lfts/inr  - can plan for EGD to further evaluate symptoms/imaging findings when no longer withdrawing and medically optimized (or sooner if develops overt gib)  - when feasible obtain MRCP to further evaluate pancreas  - rest of care as per primary team    dw gi attg    JACOB Saavedra PA-C, RD, CDN  available on TEAMS M/T/TH/F from 7am - 4pm    after hours/weekends: non urgent issues --> email [email or web address], urgent issues --> contact on-call GI fellow at [phone]

## 2024-03-14 NOTE — ED ADULT NURSE NOTE - OBJECTIVE STATEMENT
Pt received in room 15. Pt alert and oriented x 3, ambulatory at baseline. Hx of alcohol abuse. Pt transferred from [place] for GI eval. Pt has a GI bleed and is withdrawing from ETOH. Pt c/o nausea, bloody emesis, and black stool that began 2 days ago. Pt reports last drink 3 days ago. Skin warm, diaphoretic. Respirations even and unlabored. Sinus tachycardia on the monitor. Abdomen soft, round, nondistended, tender in RUQ and LUQ. Pt denies chest pain, shortness of breath, dizziness, weakness, fever, chills,  symptoms. Pt received with 20G IV in the right AC, labs drawn and pt medicated per MD orders. Pt received in room 15. Pt Azerbaijani speaking,  Jacob [id number] used. Pt alert and oriented x 3, ambulatory at baseline. Hx of alcohol abuse. Pt transferred from [place] for GI eval. Pt has a GI bleed and is withdrawing from ETOH. Pt c/o nausea, bloody emesis, and black stool that began 2 days ago. Pt reports last drink 3 days ago. Skin warm, diaphoretic. Respirations even and unlabored. Sinus tachycardia on the monitor. Abdomen soft, round, nondistended, tender in RUQ and LUQ. Pt denies chest pain, shortness of breath, dizziness, weakness, fever, chills,  symptoms. Pt received with 20G IV in the right AC, labs drawn and pt medicated per MD orders.

## 2024-03-14 NOTE — ED PROVIDER NOTE - OBJECTIVE STATEMENT
39 yo male primarily Ukrainian speaking  Jacob [id number] utilized denies PMH presenting to ED as a transfer from [facility] ED c/o 2-3 days of epigastric/upper abd pain, nausea and black vomit. Patient states drinks alcohol everyday, last drink yesterday afternoon. On arrival patient c/o epigastric pain, nausea, SOB, lightheadedness. Denies chest pain, palpitations, cough, fevers/chills, LOC, diarrhea. CT at  shows likely pancreatitis and esophageal wall thickening. Patient states does have hx of withdrawals without seizures, patient is visibly tremulous on arrival, last received Ativan at  at ~0200.

## 2024-03-14 NOTE — H&P ADULT - PROBLEM SELECTOR PLAN 4
Diet: NPO  DVT ppx: SCDs (iso hematemesis)  Dispo: pending clinical course Diet: NPO except meds   DVT ppx: SCDs (iso hematemesis)  Dispo: pending clinical course - hypocalcemia noted on admission  - may be due to pancreatitis  - ctm

## 2024-03-14 NOTE — ED PROVIDER NOTE - PROGRESS NOTE DETAILS
Colten Mcgowan PGY3: assessed patient at bedside, tachy 130s, tremulousness, aucte abd pain at this time, s/p ativan 1 hour ago. pt still clinically in withdrawal, will order additional ativan and pain medication and reassess. MM NP: Patient received total 8mg ativan, less tremulous, remains tachycardic ~130. Patient hypokalemic and hypocalcemic, IV potassium and calcium gluconate ordered.  Lactate 5.3, fluids administered and repeat pending. MICU consulted, recommends admit tele and will continue to follow. Patent less tremulous, responds to verbal stimuli. Email sent to GI for pancreatitis/upper GI bleed, HGB stable no further vomiting. Patient admitted to hospitalist Dr. Walker.

## 2024-03-14 NOTE — SBIRT NOTE ADULT - NSSBIRTALCPASSREFTXDET_GEN_A_CORE
Provided SBIRT services: Full screen positive. Referral to Treatment Performed. Screening results were reviewed with the patient and patient was provided information about healthy guidelines and potential negative consequences associated with level of risk. Motivation and readiness to reduce or stop use was discussed and goals and activities to make changes were suggested/offered. Referral for complete assessment and level of care determination at a certified treatment facility was completed by giving the patient information for treatment facilities that met their needs and encouraging them to call for an appointment. A call was not made to a facility because:  Patient not interested at this time

## 2024-03-14 NOTE — CONSULT NOTE ADULT - SUBJECTIVE AND OBJECTIVE BOX
Chief Complaint:  Patient is a 38y old  Male who presents with a chief complaint of etoh withdrawal, gib    HPI: 39 y/o M with PMH of HTN (not on medication), alcohol use disorder, ICU admission at [facility] in November 2023 for alcohol withdrawal and acute pancreatitis who presents as a transfer from [facility] for management of alcohol withdrawal and bloody emesis and dark stools.. Patient states he drinks alcohol daily and drank 20 beers yesterday. Reports epigastric pain for the past 2 days and 1 episode of coffee ground emesis yesterday.     CT A/P at [facility]: within the pancreatic tail is a 3.9 x 1.9 cm focus of hypoattenuation and there are inflammatory changes and a small amount of fluid in the adjacent fat. The pancreatic duct is not well seen. This is suspicious for acute interstitial pancreatitis with possible developing necrosis. An underlying mass as the etiology is not excluded. Thickening of distal esophagus again noted. Air-fluid level within the esophagus.     pt seen and examined.        currently febrile to 100.4 hr 139 bp 134/100  wbc 15k  hgb 18-- 15.9, plts 163k, inr 1.34, k 3.1, ag 20, ca 6.3, lipase 711-- 452, ast 149-- 88, alt 114- 69, tg 450, lactate 5.3, bal 289        Allergies:  No Known Allergies      PMHX/PSHX:  ETOH abuse      Family history:  unable to obtain     Social History: unable to obtain     Home Medications: unable to obtain     Hospital Medications:  LORazepam   Injectable 2 milliGRAM(s) IV Push every 1 hour PRN  LORazepam   Injectable 2 milliGRAM(s) IV Push every 1 hour PRN  potassium chloride  10 mEq/100 mL IVPB 10 milliEquivalent(s) IV Intermittent every 1 hour        ROS:   General:  AS PER HPI  Eyes:  Adequate vision, no reported pain  ENT:  No sore throat, runny nose, dysphagia  CV:  No chest pain, palpitations  Pulm:  No dyspnea, cough, tachypnea, wheezing  GI:  AS PER HPI  :  No pain, bleeding, burning  Muscle:  No pain, weakness  Neuro:  No tingling, numbness, memory problems  Psych:  No insomnia, mood problems, depression  Endocrine:  No polyuria, cold/heat intolerance  Heme:  No petechiae, ecchymosis, easy bruisability  Skin:  No rash, edema      Vital Signs:  Vital Signs Last 24 Hrs  T(C): 38 (14 Mar 2024 07:25), Max: 38 (14 Mar 2024 07:25)  T(F): 100.4 (14 Mar 2024 07:25), Max: 100.4 (14 Mar 2024 07:25)  HR: 139 (14 Mar 2024 07:25) (120 - 139)  BP: 134/100 (14 Mar 2024 07:25) (127/83 - 134/100)  BP(mean): --  RR: 18 (14 Mar 2024 07:25) (18 - 18)  SpO2: 99% (14 Mar 2024 07:25) (96% - 99%)    Parameters below as of 14 Mar 2024 07:25  Patient On (Oxygen Delivery Method): room air      Daily Height in cm: 165.1 (14 Mar 2024 06:37)    Daily     LABS:                        15.9   15.07 )-----------( 163      ( 14 Mar 2024 07:37 )             44.2     Mean Cell Volume: 88.6 fL (03-14-24 @ 07:37)    03-14    137  |  97<L>  |  8   ----------------------------<  166<H>  3.1<L>   |  20<L>  |  0.63    Ca    6.4<LL>      14 Mar 2024 07:37  Phos  2.6     03-14  Mg     1.60     03-14    TPro  6.6  /  Alb  3.6  /  TBili  1.0  /  DBili  x   /  AST  88<H>  /  ALT  69<H>  /  AlkPhos  100  03-14    LIVER FUNCTIONS - ( 14 Mar 2024 07:37 )  Alb: 3.6 g/dL / Pro: 6.6 g/dL / ALK PHOS: 100 U/L / ALT: 69 U/L / AST: 88 U/L / GGT: x           PT/INR - ( 14 Mar 2024 07:37 )   PT: 14.9 sec;   INR: 1.34 ratio         PTT - ( 14 Mar 2024 07:37 )  PTT:28.8 sec  Urinalysis Basic - ( 14 Mar 2024 07:37 )    Color: x / Appearance: x / SG: x / pH: x  Gluc: 166 mg/dL / Ketone: x  / Bili: x / Urobili: x   Blood: x / Protein: x / Nitrite: x   Leuk Esterase: x / RBC: x / WBC x   Sq Epi: x / Non Sq Epi: x / Bacteria: x      Amylase Serum--      Lipase [id number]       Ammonia--                          15.9   15.07 )-----------( 163      ( 14 Mar 2024 07:37 )             44.2       PHYSICAL EXAM:   GENERAL:  Lying in bed in NAD  HEENT:  Normocephalic/atraumatic, no scleral icterus  NECK: Trachea midline  CHEST:  Resp even, non labored   ABDOMEN:  Soft, non-tender, non-distended  EXTREMITIES: WWP, no edema  SKIN:  No rash or jaundice  NEURO:  Alert and oriented x 3, no asterixis    Imaging:           ACC: [id number]     EXAM:  CT ABDOMEN AND PELVIS IC   ORDERED BY: CAYETANO HATHAWAY    PROCEDURE DATE:  03/14/2024        INTERPRETATION:  CLINICAL INFORMATION: Epigastric pain. Vomiting    COMPARISON: CT abdomen pelvis 11/14/2023.    CONTRAST/COMPLICATIONS:  IV Contrast: Omnipaque 350  90 cc administered   10 cc discarded  Oral Contrast: NONE  Complications: None reported at time of study completion    PROCEDURE:  CT of the Abdomen and Pelvis was performed.  Sagittal and coronal reformats were performed.    FINDINGS:  LOWER CHEST: Within normal limits.    LIVER: Within normal limits.  BILE DUCTS: Normal caliber.  GALLBLADDER: Within normal limits.  SPLEEN: Within normal limits.  PANCREAS: Within the pancreatic tail is a 3.9 x 1.9 cm focus of hypoattenuation and there are inflammatory changes and a small amount of fluid in the adjacent fat. The pancreatic duct is not well seen. This is suspicious for acute interstitial pancreatitis with possible developing necrosis. An underlying mass as the etiology is not excluded. Recommend follow-up to assess contrast-enhanced MRI abdomen with MRCP. The adjacent vessels are widely patent.  ADRENALS: Within normal limits.  KIDNEYS/URETERS: Within normal limits.    BLADDER: Distended urinary bladder.  REPRODUCTIVE ORGANS: Prostate within normal limits.    BOWEL: Thickening of the distal esophagus again noted. Air-fluid level within the esophagus. This would be better evaluated with endoscopy. No bowel obstruction. Appendix is normal.  PERITONEUM: No ascites.  VESSELS: Within normal limits.  RETROPERITONEUM/LYMPH NODES: No lymphadenopathy.  ABDOMINAL WALL: Within normal limits.  BONES: Within normal limits.    IMPRESSION:    Probable acute interstitial pancreatitis as described above.    Thickening of the distal esophagus again noted. Air-fluid level within the esophagus. This would be better evaluated with endoscopy      --- End of Report ---            VIC DE LEÓN MD; Attending Radiologist  This document has been electronically signed. Mar 14 2024  1:29AM         Chief Complaint:  Patient is a 38y old  Male who presents with a chief complaint of etoh withdrawal, gib    HPI: 37 y/o M with PMH of HTN (not on medication), alcohol use disorder, ICU admission at [facility] in November 2023 for alcohol withdrawal and acute pancreatitis who presents as a transfer from [facility] for management of alcohol withdrawal and bloody emesis and dark stools.. Patient states he drinks alcohol daily and drank 20 beers yesterday. Reports epigastric pain for the past 2 days and 1 episode of coffee ground emesis yesterday.     CT A/P at [facility]: within the pancreatic tail is a 3.9 x 1.9 cm focus of hypoattenuation and there are inflammatory changes and a small amount of fluid in the adjacent fat. The pancreatic duct is not well seen. This is suspicious for acute interstitial pancreatitis with possible developing necrosis. An underlying mass as the etiology is not excluded. Thickening of distal esophagus again noted. Air-fluid level within the esophagus.     pt seen and examined. lethargic in bed but arousable. provides minimal hx despite use of  id# [id number]. hx obtained via chart and medical staff. +etoh use. c/o 8 days of vomiting- described as dark in color, no sarabjit hematemesis, and epigastric abd pain. he is unclear regarding color of stool at time of eval. unclear if prior nsaid use and unclear re: prior endoscopic hx.    currently febrile to 100.4 hr 139 bp 134/100  wbc 15k  hgb 18-- 15.9, plts 163k, inr 1.34, k 3.1, ag 20, ca 6.3, lipase 711-- 452, ast 149-- 88, alt 114- 69, tg 450, lactate 5.3, bal 289      Allergies:  No Known Allergies      PMHX/PSHX:  ETOH abuse    Family history:  unable to obtain from pt    Social History: unable to obtain from pt    Home Medications: unable to obtain from pt    Hospital Medications:  LORazepam   Injectable 2 milliGRAM(s) IV Push every 1 hour PRN  LORazepam   Injectable 2 milliGRAM(s) IV Push every 1 hour PRN  potassium chloride  10 mEq/100 mL IVPB 10 milliEquivalent(s) IV Intermittent every 1 hour        ROS:   see hpi, unable to obtain in entirety from pt      Vital Signs:  Vital Signs Last 24 Hrs  T(C): 38 (14 Mar 2024 07:25), Max: 38 (14 Mar 2024 07:25)  T(F): 100.4 (14 Mar 2024 07:25), Max: 100.4 (14 Mar 2024 07:25)  HR: 139 (14 Mar 2024 07:25) (120 - 139)  BP: 134/100 (14 Mar 2024 07:25) (127/83 - 134/100)  BP(mean): --  RR: 18 (14 Mar 2024 07:25) (18 - 18)  SpO2: 99% (14 Mar 2024 07:25) (96% - 99%)    Parameters below as of 14 Mar 2024 07:25  Patient On (Oxygen Delivery Method): room air      Daily Height in cm: 165.1 (14 Mar 2024 06:37)    Daily     LABS:                        15.9   15.07 )-----------( 163      ( 14 Mar 2024 07:37 )             44.2     Mean Cell Volume: 88.6 fL (03-14-24 @ 07:37)    03-14    137  |  97<L>  |  8   ----------------------------<  166<H>  3.1<L>   |  20<L>  |  0.63    Ca    6.4<LL>      14 Mar 2024 07:37  Phos  2.6     03-14  Mg     1.60     03-14    TPro  6.6  /  Alb  3.6  /  TBili  1.0  /  DBili  x   /  AST  88<H>  /  ALT  69<H>  /  AlkPhos  100  03-14    LIVER FUNCTIONS - ( 14 Mar 2024 07:37 )  Alb: 3.6 g/dL / Pro: 6.6 g/dL / ALK PHOS: 100 U/L / ALT: 69 U/L / AST: 88 U/L / GGT: x           PT/INR - ( 14 Mar 2024 07:37 )   PT: 14.9 sec;   INR: 1.34 ratio         PTT - ( 14 Mar 2024 07:37 )  PTT:28.8 sec  Urinalysis Basic - ( 14 Mar 2024 07:37 )    Color: x / Appearance: x / SG: x / pH: x  Gluc: 166 mg/dL / Ketone: x  / Bili: x / Urobili: x   Blood: x / Protein: x / Nitrite: x   Leuk Esterase: x / RBC: x / WBC x   Sq Epi: x / Non Sq Epi: x / Bacteria: x      Amylase Serum--      Lipase [id number]       Ammonia--                          15.9   15.07 )-----------( 163      ( 14 Mar 2024 07:37 )             44.2       PHYSICAL EXAM:   GENERAL: lying in bed  HEENT: NCAT  NECK: trachea midline  CV: tachy  CHEST:  respirations even, non labored  ABDOMEN:  softly distended, generalized ttp most prominent epigastric region  EXTREMITIES: WWP  SKIN:  warm/dry  PSYCH: lethargic, confused, answers simple questions  NEURO: mild UE tremor    Imaging:           ACC: [id number]     EXAM:  CT ABDOMEN AND PELVIS IC   ORDERED BY: CAYETANO HATHAWAY    PROCEDURE DATE:  03/14/2024        INTERPRETATION:  CLINICAL INFORMATION: Epigastric pain. Vomiting    COMPARISON: CT abdomen pelvis 11/14/2023.    CONTRAST/COMPLICATIONS:  IV Contrast: Omnipaque 350  90 cc administered   10 cc discarded  Oral Contrast: NONE  Complications: None reported at time of study completion    PROCEDURE:  CT of the Abdomen and Pelvis was performed.  Sagittal and coronal reformats were performed.    FINDINGS:  LOWER CHEST: Within normal limits.    LIVER: Within normal limits.  BILE DUCTS: Normal caliber.  GALLBLADDER: Within normal limits.  SPLEEN: Within normal limits.  PANCREAS: Within the pancreatic tail is a 3.9 x 1.9 cm focus of hypoattenuation and there are inflammatory changes and a small amount of fluid in the adjacent fat. The pancreatic duct is not well seen. This is suspicious for acute interstitial pancreatitis with possible developing necrosis. An underlying mass as the etiology is not excluded. Recommend follow-up to assess contrast-enhanced MRI abdomen with MRCP. The adjacent vessels are widely patent.  ADRENALS: Within normal limits.  KIDNEYS/URETERS: Within normal limits.    BLADDER: Distended urinary bladder.  REPRODUCTIVE ORGANS: Prostate within normal limits.    BOWEL: Thickening of the distal esophagus again noted. Air-fluid level within the esophagus. This would be better evaluated with endoscopy. No bowel obstruction. Appendix is normal.  PERITONEUM: No ascites.  VESSELS: Within normal limits.  RETROPERITONEUM/LYMPH NODES: No lymphadenopathy.  ABDOMINAL WALL: Within normal limits.  BONES: Within normal limits.    IMPRESSION:    Probable acute interstitial pancreatitis as described above.    Thickening of the distal esophagus again noted. Air-fluid level within the esophagus. This would be better evaluated with endoscopy      --- End of Report ---            VIC DE LEÓN MD; Attending Radiologist  This document has been electronically signed. Mar 14 2024  1:29AM

## 2024-03-14 NOTE — ED ADULT TRIAGE NOTE - CHIEF COMPLAINT QUOTE
Pt arrives as GI transfer from [place].  Pt has GI bleed and is withdrawing from ETOH experiencing N/V with report of bloody emesis and black stool.  Pt appears to have tremors with hands extended.      Pt A&Ox3, Pt received 4mg Morphine @01:04; received 4mg Ativan total with last dose at 02:17; received Ceftriaxone @22:00;  2 Liters fluid due to lactate of 6.6.  Rt AC 20g Pt arrives as GI transfer from [place].  Pt has GI bleed and is withdrawing from ETOH experiencing N/V with report of bloody emesis and black stool.  Pt appears to have tremors with hands extended.  Last CIWA reported at 02:15 = 9). Last drink approximately 18:00.  Pt A&Ox3, Pt received 4mg Morphine @01:04; received 4mg Ativan total with last dose at 02:17; received Ceftriaxone @22:00;  2 Liters fluid due to lactate of 6.6.  Rt AC 20g

## 2024-03-14 NOTE — H&P ADULT - HISTORY OF PRESENT ILLNESS
39 yo M with pmhx of pancreatitis and alcohol use disorder  39 yo M with pmhx of pancreatitis and alcohol use disorder comes to the ED as a transfer from  after experiencing hematemesis.       In the ED, his vitals were significant for and his labs showed. A CT A/P demonstrated s/p . He is now admitted for further management.  37 yo M with pmhx of pancreatitis and alcohol use disorder comes to the ED as a transfer from  after experiencing hematemesis. The patient began experiencing epigastric pain that started 3/11/24 after he consumed alcohol. The patient drinks approximately 10-20 cans of beer per day and his last drink was the morning prior to his presentation to . He also began experiencing hematemesis prior to coming into the hospital. Of note, the patient       In the ED, his vitals were significant for and his labs showed. A CT A/P demonstrated s/p . He is now admitted for further management.  39 yo M with pmhx of pancreatitis and alcohol use disorder comes to the ED as a transfer from  after experiencing hematemesis. The patient began experiencing epigastric pain that started 3/11/24 after he consumed alcohol. The patient drinks approximately 10-20 cans of beer per day and his last drink was the morning prior to his presentation to . He also began experiencing hematemesis prior to coming into the hospital. Of note, the patient has had multiple admissions for alcohol withdrawal, pancreatitis, and an admission to the ICU in  11/23 for alcohol withdrawal. His abdominal pain is epigastric and radiates to his back.       In the ED, his vitals were significant for tachycardia, tachypnea, and an elevated blood pressure and his labs showed leukocytosis, elevated hemoglobin, lipase, triglycerides, and transaminitis. A CT A/P demonstrated pancreatitis and possible necrotizing pancreatitis, along with esophageal thickening s/p 12mg ativan and 50mg librium and fluids. He is now admitted for further management.

## 2024-03-14 NOTE — ED PROVIDER NOTE - CLINICAL SUMMARY MEDICAL DECISION MAKING FREE TEXT BOX
I reviewed prior records from GI and no mention of varices. Character and exam consistent with acute pancreatitis, confirmed on CT, with concern for developing necrosis. Exam and history also consistent with alcohol withdrawal and given Ativan, fluids, analgesia, Zofran with significant improvement though still in active withdrawal. No further active emesis here in the ED, and patient hemodynamically stable and not anemic and does not require emergent transfusion or transfer at this time. I reviewed prior records from GI and no mention of varices. Character and exam consistent with acute pancreatitis, confirmed on CT, with concern for developing necrosis. Exam and history also consistent with alcohol withdrawal and given Ativan, fluids, analgesia, Zofran with significant improvement though still in active withdrawal. No further active emesis here in the ED, and patient hemodynamically stable and not anemic and does not require emergent transfusion or transfer at this time. Given additional IV fluids and Ativan due to tachycardia. ICU consulted and Seen by Dr. Castaneda of ICU and does not feel comfortable with pt here without GI coverage overnight due to potential for decompensation or possible varices. I d/w Dr. Hansen of GI and accepted for transfer to [facility]. NAD, nontoxic appearing, stable.

## 2024-03-14 NOTE — CONSULT NOTE ADULT - ATTENDING COMMENTS
Patient examined and case reviewed in detail on bedside rounds  Pt with eton withdrawal and pancreatitis Does not need MICU level care at the present time Reconsult prn

## 2024-03-14 NOTE — CONSULT NOTE ADULT - ASSESSMENT
37 y/o M with PMH of HTN (not on medication), alcohol use disorder, ICU admission at [facility] in November 2023 for alcohol withdrawal and acute pancreatitis who presented to [facility] with epigastric pain and coffee ground emesis, found to have acute pancreatitis with possible necrosis on CT A/P. Transferred from [facility] for further management of alcohol withdrawal and hematemesis.  39 y/o M with PMH of HTN (not on medication), alcohol use disorder, ICU admission at [facility] in November 2023 for alcohol withdrawal and acute pancreatitis who presented to [facility] with epigastric pain and coffee ground emesis, found to have acute pancreatitis with possible necrosis on CT A/P. Transferred from [facility] for further management of alcohol withdrawal and hematemesis. MICU consulted for alcohol withdrawal    Recommendations: 37 y/o M with PMH of HTN (not on medication), alcohol use disorder, ICU admission at [facility] in November 2023 for alcohol withdrawal and acute pancreatitis who presented to [facility] with epigastric pain and coffee ground emesis, found to have acute pancreatitis with possible necrosis on CT A/P. Transferred from [facility] for further management of alcohol withdrawal and hematemesis. MICU consulted for alcohol withdrawal. On exam, patient A&Ox3, answering questions appropriately and following commands, b/l UE tremors. HR 140s. Patient has received Ativan 2 mg IVP x2 in ED.     #Alcohol Withdrawal   Recommendations:  -would give another 4 mg of Ativan  -maintain on   -CIWA protocol  -management of hematemesis and possible necrotizing pancreatitis per GI    Patient is not a MICU candidate at this time. Please re-consult as needed    Sandra Gillespie MD  Internal Medicine PGY3 37 y/o M with PMH of HTN (not on medication), alcohol use disorder, ICU admission at [facility] in November 2023 for alcohol withdrawal and acute pancreatitis who presented to [facility] with epigastric pain and coffee ground emesis, found to have acute pancreatitis with possible necrosis on CT A/P. Transferred from [facility] for further management of alcohol withdrawal and hematemesis. MICU consulted for alcohol withdrawal. On exam, patient A&Ox3, answering questions appropriately and following commands, b/l UE tremors. HR 140s. Patient has received Ativan 2 mg IVP x2 in ED.     #Alcohol Withdrawal     Recommendations:  -would give another 4 mg of Ativan  -maintain on   -CIWA protocol  -management of hematemesis and possible necrotizing pancreatitis per GI    Patient is not a MICU candidate at this time. Please re-consult as needed    Sandra Gillespie MD  Internal Medicine PGY3 37 y/o M with PMH of HTN (not on medication), alcohol use disorder, ICU admission at [facility] in November 2023 for alcohol withdrawal and acute pancreatitis who presented to [facility] with epigastric pain and coffee ground emesis, found to have acute pancreatitis with possible necrosis on CT A/P. Transferred from [facility] for further management of alcohol withdrawal and hematemesis. Last drink 3/13. MICU consulted for alcohol withdrawal. On exam, patient A&Ox3, answering questions appropriately and following commands, b/l UE tremors. HR 140s. Patient has received Ativan 2 mg IVP x2 in ED.     #Alcohol Withdrawal     Recommendations:  -would give another 4 mg of Ativan  -maintain on   -CIWA protocol  -folic acid and thiamine supplementation  -management of hematemesis and possible necrotizing pancreatitis per GI    Patient is not a MICU candidate at this time. Please re-consult as needed    Sandar Gillespie MD  Internal Medicine PGY3

## 2024-03-14 NOTE — ED PROVIDER NOTE - PHYSICAL EXAMINATION
Afebrile, hemodynamically stable, saturating well  Uncomfortable appearing, alize basin with red-tinged emesis and black specks, no WOB, speaking full sentences  Head NCAT  EOMI grossly, anicteric  MMM  No JVD  Tachy, nml S1/S2, no m/r/g  Lungs CTAB, no w/r/r  Abd soft, epigastric TTP, ND, no rebound or guarding  AAO, CN's 3-12 grossly intact  HILLS spontaneously, no leg cyanosis or edema  Skin warm, dry, no rashes or hives

## 2024-03-14 NOTE — H&P ADULT - NSHPREVIEWOFSYSTEMS_GEN_ALL_CORE
Review of Systems:  Constitutional: No fever, No weight loss, good appetite/po intake  Head: No headache   Eyes: No blurry vision, No diplopia  Neuro: No tremors, No muscle weakness   Cardiovascular: No chest pain, No palpitations  Respiratory: No SOB, No cough  GI: No nausea, No vomiting, No diarrhea  : No dysuria, No hematuria  Skin: No rash  MSK: No joint pain   Psych: No depression  Heme: No abnormal bruising, no abnormal bleeding Review of Systems:  Constitutional: No fever, No weight loss, poor appetite/po intake  Head: + headache   Eyes: No blurry vision, No diplopia  Neuro: + tremors, No muscle weakness   Cardiovascular: No chest pain, No palpitations  Respiratory: No SOB, No cough  GI: + nausea, + vomiting, No diarrhea  : No dysuria, No hematuria  Skin: No rash  MSK: No joint pain     Heme: No abnormal bruising, no abnormal bleeding

## 2024-03-14 NOTE — CONSULT NOTE ADULT - SUBJECTIVE AND OBJECTIVE BOX
CHIEF COMPLAINT:    HPI:    PAST MEDICAL & SURGICAL HISTORY:  ETOH abuse          FAMILY HISTORY:      SOCIAL HISTORY:  Smoking: __ packs x ___ years  EtOH Use:  Marital Status:  Occupation:  Recent Travel:  Country of Birth:  Advance Directives:    Allergies    No Known Allergies    Intolerances        HOME MEDICATIONS:    REVIEW OF SYSTEMS:    CONSTITUTIONAL: No weakness, fevers or chills  EYES: No visual changes; no sclera icterics, no pain or drainage  ENT:  No vertigo or throat pain   NECK: No pain or stiffness  RESPIRATORY: No cough, wheezing, hemoptysis; No shortness of breath  CARDIOVASCULAR: No chest pain or palpitations  GASTROINTESTINAL: +epigastric pain, nausea, hematemesis No diarrhea or constipation. No melena or hematochezia.  GENITOURINARY: No dysuria, frequency or hematuria  NEUROLOGICAL: +tremors. No numbness or weakness. No headache  SKIN: No itching, rashes  Psych: No anxiety or depression      OBJECTIVE:  ICU Vital Signs Last 24 Hrs  T(C): 38 (14 Mar 2024 07:25), Max: 38 (14 Mar 2024 07:25)  T(F): 100.4 (14 Mar 2024 07:25), Max: 100.4 (14 Mar 2024 07:25)  HR: 139 (14 Mar 2024 07:25) (120 - 139)  BP: 134/100 (14 Mar 2024 07:25) (127/83 - 134/100)  BP(mean): --  ABP: --  ABP(mean): --  RR: 18 (14 Mar 2024 07:25) (18 - 18)  SpO2: 99% (14 Mar 2024 07:25) (96% - 99%)    O2 Parameters below as of 14 Mar 2024 07:25  Patient On (Oxygen Delivery Method): room air              CAPILLARY BLOOD GLUCOSE          PHYSICAL EXAM:  Constitutional: NAD  HEENT: EOMI, anicteric sclera, no nystagmus  Neck: no JVD, supple, no LAD, no thyromegaly  Chest: normal WOB at rest, CTAB  Heart: tachycardic, physiologic S1 and S2, no murmurs, no rubs, no gallops, 2+ radial pulses  Abd: +epigastric tenderness, soft, nondistended,, no rebound, no involuntary guarding  Extremities: no clubbing, warm hands and feet, no edema  Neuro: alert, answering questions appropriately, follows commands, +b/l UE tremors  MSK: spontaneous movement of all 4 extremities,   Psych: no audiovisual hallucinations  Skin: No jaundice. no rashes    HOSPITAL MEDICATIONS:  MEDICATIONS  (STANDING):  calcium gluconate IVPB 2 Gram(s) IV Intermittent Once  pantoprazole  Injectable 40 milliGRAM(s) IV Push Once  potassium chloride  10 mEq/100 mL IVPB 10 milliEquivalent(s) IV Intermittent every 1 hour    MEDICATIONS  (PRN):  LORazepam   Injectable 2 milliGRAM(s) IV Push every 1 hour PRN CIWA-Ar score 8 or greater  LORazepam   Injectable 2 milliGRAM(s) IV Push every 1 hour PRN Symptom-triggered: each CIWA -Ar score 8 or GREATER      LABS:                        15.9   15.07 )-----------( 163      ( 14 Mar 2024 07:37 )             44.2     03-14    137  |  97<L>  |  8   ----------------------------<  166<H>  3.1<L>   |  20<L>  |  0.63    Ca    6.4<LL>      14 Mar 2024 07:37  Phos  2.6     03-14  Mg     1.60     03-14    TPro  6.6  /  Alb  3.6  /  TBili  1.0  /  DBili  x   /  AST  88<H>  /  ALT  69<H>  /  AlkPhos  100  03-14    PT/INR - ( 14 Mar 2024 07:37 )   PT: 14.9 sec;   INR: 1.34 ratio         PTT - ( 14 Mar 2024 07:37 )  PTT:28.8 sec  Urinalysis Basic - ( 14 Mar 2024 07:37 )    Color: x / Appearance: x / SG: x / pH: x  Gluc: 166 mg/dL / Ketone: x  / Bili: x / Urobili: x   Blood: x / Protein: x / Nitrite: x   Leuk Esterase: x / RBC: x / WBC x   Sq Epi: x / Non Sq Epi: x / Bacteria: x        Venous Blood Gas:  03-14 @ 07:37  7.40/36/51/22/81.5  VBG Lactate: 5.3      MICROBIOLOGY:     RADIOLOGY:  [ ] Reviewed and interpreted by me    EKG: CHIEF COMPLAINT: transfer from [facility]    HPI:  39 y/o M with PMH of HTN (not on medication), alcohol use disorder, ICU admission at [facility] in November 2023 for alcohol withdrawal and acute pancreatitis who presents as a transfer from [facility] for management of alcohol withdrawal and hematemsis. Patient states he drinks alcohol daily and drank 20 beers yesterday. Denies tobacco or drug use. Reports epigastric pain for the past 2 days and 1 episode of hematemesis yesterday. Patient denies h/o of seizures however [facility] records note h/o alcohol withdrawal seizures. Labs at [facility] showed Hgb 16, lipase 711, pH 7.54, pCO2 29, bicarb 25, lactate 7.5, AST, ALT,  blood alcohol 289. CT A/P at [facility] showed acute interstitial pancreatitis with possible necrosis and esophageal thickening. At [facility], patient received ativan 4 mg IVP, morphine 4 mg, ceftriaxone, librium, and 2 L IVF.    In the ED, tachycardic to 140s, Tmax 100.4. Patient tremulous and was given Ativan 2 mg IVP x2. Also received 1 L NS, calcium gluconate 2 g, morphine 2 mg IVP, 1 L NS. Labs significant for Hgb 15.9, WBC 15, plt 163, INR 1.3, K 3.1, calcium 6.4, ph 7.4, lactate 5.    PAST MEDICAL & SURGICAL HISTORY:  ETOH abuse          FAMILY HISTORY:      SOCIAL HISTORY:  Smoking: None  EtOH Use: daily, last drink 3/13 drank 20 beers  Marital Status:   No drug use    Allergies    No Known Allergies    Intolerances        HOME MEDICATIONS:    REVIEW OF SYSTEMS:    CONSTITUTIONAL: No weakness, fevers or chills  EYES: No visual changes; no sclera icterics, no pain or drainage  ENT:  No vertigo or throat pain   NECK: No pain or stiffness  RESPIRATORY: No cough, wheezing, hemoptysis; No shortness of breath  CARDIOVASCULAR: No chest pain or palpitations  GASTROINTESTINAL: +epigastric pain, nausea, hematemesis No diarrhea or constipation. No melena or hematochezia.  GENITOURINARY: No dysuria, frequency or hematuria  NEUROLOGICAL: +tremors. No numbness or weakness. No headache  SKIN: No itching, rashes  Psych: No anxiety or depression      OBJECTIVE:  ICU Vital Signs Last 24 Hrs  T(C): 38 (14 Mar 2024 07:25), Max: 38 (14 Mar 2024 07:25)  T(F): 100.4 (14 Mar 2024 07:25), Max: 100.4 (14 Mar 2024 07:25)  HR: 139 (14 Mar 2024 07:25) (120 - 139)  BP: 134/100 (14 Mar 2024 07:25) (127/83 - 134/100)  BP(mean): --  ABP: --  ABP(mean): --  RR: 18 (14 Mar 2024 07:25) (18 - 18)  SpO2: 99% (14 Mar 2024 07:25) (96% - 99%)    O2 Parameters below as of 14 Mar 2024 07:25  Patient On (Oxygen Delivery Method): room air              CAPILLARY BLOOD GLUCOSE          PHYSICAL EXAM:  Constitutional: NAD  HEENT: EOMI, anicteric sclera, no nystagmus  Neck: no JVD, supple, no LAD, no thyromegaly  Chest: normal WOB at rest, CTAB  Heart: tachycardic, physiologic S1 and S2, no murmurs, no rubs, no gallops, 2+ radial pulses  Abd: +epigastric tenderness, soft, nondistended,, no rebound, no involuntary guarding  Extremities: no clubbing, warm hands and feet, no edema  Neuro: alert, answering questions appropriately, follows commands, +b/l UE tremors  MSK: spontaneous movement of all 4 extremities,   Psych: no audiovisual hallucinations  Skin: No jaundice. no rashes    HOSPITAL MEDICATIONS:  MEDICATIONS  (STANDING):  calcium gluconate IVPB 2 Gram(s) IV Intermittent Once  pantoprazole  Injectable 40 milliGRAM(s) IV Push Once  potassium chloride  10 mEq/100 mL IVPB 10 milliEquivalent(s) IV Intermittent every 1 hour    MEDICATIONS  (PRN):  LORazepam   Injectable 2 milliGRAM(s) IV Push every 1 hour PRN CIWA-Ar score 8 or greater  LORazepam   Injectable 2 milliGRAM(s) IV Push every 1 hour PRN Symptom-triggered: each CIWA -Ar score 8 or GREATER      LABS:                        15.9   15.07 )-----------( 163      ( 14 Mar 2024 07:37 )             44.2     03-14    137  |  97<L>  |  8   ----------------------------<  166<H>  3.1<L>   |  20<L>  |  0.63    Ca    6.4<LL>      14 Mar 2024 07:37  Phos  2.6     03-14  Mg     1.60     03-14    TPro  6.6  /  Alb  3.6  /  TBili  1.0  /  DBili  x   /  AST  88<H>  /  ALT  69<H>  /  AlkPhos  100  03-14    PT/INR - ( 14 Mar 2024 07:37 )   PT: 14.9 sec;   INR: 1.34 ratio         PTT - ( 14 Mar 2024 07:37 )  PTT:28.8 sec  Urinalysis Basic - ( 14 Mar 2024 07:37 )    Color: x / Appearance: x / SG: x / pH: x  Gluc: 166 mg/dL / Ketone: x  / Bili: x / Urobili: x   Blood: x / Protein: x / Nitrite: x   Leuk Esterase: x / RBC: x / WBC x   Sq Epi: x / Non Sq Epi: x / Bacteria: x        Venous Blood Gas:  03-14 @ 07:37  7.40/36/51/22/81.5  VBG Lactate: 5.3      MICROBIOLOGY:     RADIOLOGY:  [ ] Reviewed and interpreted by me    EKG: CHIEF COMPLAINT: transfer from [facility]    HPI:  39 y/o M with PMH of HTN (not on medication), alcohol use disorder, ICU admission at [facility] in November 2023 for alcohol withdrawal and acute pancreatitis who presents as a transfer from [facility] for management of alcohol withdrawal and hematemsis. Patient states he drinks alcohol daily and drank 20 beers yesterday. Denies tobacco or drug use. Reports epigastric pain for the past 2 days and 1 episode of coffee ground emesis yesterday. Patient denies h/o of seizures however [facility] records note h/o alcohol withdrawal seizures. Labs at [facility] showed Hgb 16, pH 7.54, pCO2 29, bicarb 25, lactate 7.5,  , ,  lipase 711, triglycerides 450, blood alcohol level 289. CT A/P at [facility] showed acute interstitial pancreatitis with possible necrosis and esophageal thickening. At [facility], patient received ativan 4 mg IVP, morphine 4 mg, ceftriaxone, librium, and 2 L IVF.    In the ED here, patient tachycardic to 140s, Tmax 100.4. Patient tremulous and was given Ativan 2 mg IVP x2. Also received 1 L NS, calcium gluconate 2 g, morphine 2 mg IVP, 1 L NS. Labs significant for Hgb 15.9, WBC 15, plt 163, INR 1.3, K 3.1, calcium 6.4, ph 7.4, lactate 5, lipase 452.    PAST MEDICAL & SURGICAL HISTORY:  ETOH abuse          FAMILY HISTORY:      SOCIAL HISTORY:  Smoking: None  EtOH Use: daily, last drink 3/13 drank 20 beers  Marital Status:   No drug use    Allergies    No Known Allergies    Intolerances        HOME MEDICATIONS:    REVIEW OF SYSTEMS:    CONSTITUTIONAL: No weakness, fevers or chills  EYES: No visual changes; no sclera icterics, no pain or drainage  ENT:  No vertigo or throat pain   NECK: No pain or stiffness  RESPIRATORY: No cough, wheezing, hemoptysis; No shortness of breath  CARDIOVASCULAR: No chest pain or palpitations  GASTROINTESTINAL: +epigastric pain, nausea, hematemesis No diarrhea or constipation. No melena or hematochezia.  GENITOURINARY: No dysuria, frequency or hematuria  NEUROLOGICAL: +tremors. No numbness or weakness. No headache  SKIN: No itching, rashes  Psych: No anxiety or depression      OBJECTIVE:  ICU Vital Signs Last 24 Hrs  T(C): 38 (14 Mar 2024 07:25), Max: 38 (14 Mar 2024 07:25)  T(F): 100.4 (14 Mar 2024 07:25), Max: 100.4 (14 Mar 2024 07:25)  HR: 139 (14 Mar 2024 07:25) (120 - 139)  BP: 134/100 (14 Mar 2024 07:25) (127/83 - 134/100)  BP(mean): --  ABP: --  ABP(mean): --  RR: 18 (14 Mar 2024 07:25) (18 - 18)  SpO2: 99% (14 Mar 2024 07:25) (96% - 99%)    O2 Parameters below as of 14 Mar 2024 07:25  Patient On (Oxygen Delivery Method): room air              CAPILLARY BLOOD GLUCOSE          PHYSICAL EXAM:  Constitutional: NAD  HEENT: EOMI, anicteric sclera, no nystagmus  Neck: no JVD, supple, no LAD, no thyromegaly  Chest: normal WOB at rest, CTAB  Heart: tachycardic, physiologic S1 and S2, no murmurs, no rubs, no gallops, 2+ radial pulses  Abd: +epigastric tenderness, soft, nondistended,, no rebound, no involuntary guarding  Extremities: no clubbing, warm hands and feet, no edema  Neuro: alert, answering questions appropriately, follows commands, +b/l UE tremors  MSK: spontaneous movement of all 4 extremities,   Psych: no audiovisual hallucinations  Skin: No jaundice. no rashes    HOSPITAL MEDICATIONS:  MEDICATIONS  (STANDING):  calcium gluconate IVPB 2 Gram(s) IV Intermittent Once  pantoprazole  Injectable 40 milliGRAM(s) IV Push Once  potassium chloride  10 mEq/100 mL IVPB 10 milliEquivalent(s) IV Intermittent every 1 hour    MEDICATIONS  (PRN):  LORazepam   Injectable 2 milliGRAM(s) IV Push every 1 hour PRN CIWA-Ar score 8 or greater  LORazepam   Injectable 2 milliGRAM(s) IV Push every 1 hour PRN Symptom-triggered: each CIWA -Ar score 8 or GREATER      LABS:                        15.9   15.07 )-----------( 163      ( 14 Mar 2024 07:37 )             44.2     03-14    137  |  97<L>  |  8   ----------------------------<  166<H>  3.1<L>   |  20<L>  |  0.63    Ca    6.4<LL>      14 Mar 2024 07:37  Phos  2.6     03-14  Mg     1.60     03-14    TPro  6.6  /  Alb  3.6  /  TBili  1.0  /  DBili  x   /  AST  88<H>  /  ALT  69<H>  /  AlkPhos  100  03-14    PT/INR - ( 14 Mar 2024 07:37 )   PT: 14.9 sec;   INR: 1.34 ratio         PTT - ( 14 Mar 2024 07:37 )  PTT:28.8 sec  Urinalysis Basic - ( 14 Mar 2024 07:37 )    Color: x / Appearance: x / SG: x / pH: x  Gluc: 166 mg/dL / Ketone: x  / Bili: x / Urobili: x   Blood: x / Protein: x / Nitrite: x   Leuk Esterase: x / RBC: x / WBC x   Sq Epi: x / Non Sq Epi: x / Bacteria: x        Venous Blood Gas:  03-14 @ 07:37  7.40/36/51/22/81.5  VBG Lactate: 5.3      MICROBIOLOGY:     RADIOLOGY:  [ ] Reviewed and interpreted by me    EKG:

## 2024-03-14 NOTE — H&P ADULT - NSHPSOCIALHISTORY_GEN_ALL_CORE
alcohol consumption alcohol consumption drinks about 10-20 cans of beer per day. No smoking hx nor drug use. Lives with his wife.

## 2024-03-14 NOTE — ED PROVIDER NOTE - CLINICAL SUMMARY MEDICAL DECISION MAKING FREE TEXT BOX
39 yo male primarily Divehi speaking  Jacob [id number] utilized denies PMH presenting to ED as a transfer from [facility] ED c/o 2-3 days of epigastric/upper abd pain, nausea and black vomit. Patient states drinks alcohol everyday, last drink yesterday afternoon. On arrival patient c/o epigastric pain, nausea, SOB, lightheadedness. Denies chest pain, palpitations, cough, fevers/chills, LOC, diarrhea. CT at  shows likely pancreatitis and esophageal wall thickening. Patient states does have hx of withdrawals without seizures, patient is visibly tremulous on arrival, last received Ativan at  at ~0200. On exam abd soft, not distended, diffusely tender on palpation, patient tachycardic ~130BPM, regular rhythm, l/s equal clr bilat. Will administer 2mg Ativan IV for ETOH withdrawals and order CIWA protocol. Most likely acute pancreatitis, will obtain lipase. Will obtain CMP to assess electrolyte status, CBC to assess anemia/leukocytosis. Concern for upper GI bleed, protonix and ceftriaxone administered at ED, HGB 18, will trend and consider octreotide/protonix infusion. Dispo pending results, likely admit. 39 yo male primarily Hong Konger speaking  Jacob [id number] utilized denies PMH presenting to ED as a transfer from [facility] ED c/o 2-3 days of epigastric/upper abd pain, nausea and black vomit. Patient states drinks alcohol everyday, last drink yesterday afternoon. On arrival patient c/o epigastric pain, nausea, SOB, lightheadedness. Denies chest pain, palpitations, cough, fevers/chills, LOC, diarrhea. CT at  shows likely pancreatitis and esophageal wall thickening. Patient states does have hx of withdrawals without seizures, patient is visibly tremulous on arrival, last received Ativan at  at ~0200. On exam abd soft, not distended, diffusely tender on palpation, patient tachycardic ~130BPM, regular rhythm, l/s equal clr bilat. Will administer 2mg Ativan IV for ETOH withdrawals and order CIWA protocol. Most likely acute pancreatitis, will obtain lipase. Will obtain CMP to assess electrolyte status, CBC to assess anemia/leukocytosis. Concern for upper GI bleed, protonix and ceftriaxone administered at ED, HGB 18, will trend and consider octreotide/protonix infusion. Dispo pending results, likely admit.    fredy: Patient presents after being transferred from [place].  Hong [place]er-speaking.  He has 2 to 3 days of epigastric upper abdominal pain and far chills he was noted to have black vomit.  He drinks every day last drink was yesterday afternoon.  Patient with epigastric pain on presentation but also with significant alcohol withdrawal with tremor tachycardia.  He was given Ativan about 2:00 in [place] most recently.  He is given 2 mg of Ativan here around 7:00 and again at about 830.  We discussed the option of starting phenobarb which looks like it be advisable given how much he is responding to his withdrawal.    Hemoglobin here looks hemoconcentrated.  Patient does have epigastric pain.  Patient has CT that shows pancreatitis as well.    ICU was consulted to see the patient to consider for ICU placement.  They suggest 1 more dose of Ativan and if that is not adequate to have the phenobarb started    Patient here is awake episodically lucid and not lucid.  Does not know where he is.  But is able to communicate.  Has some mild somnolence but is very arousable.  Abdomen soft mild epigastric tenderness.  Patient is sweaty as well.

## 2024-03-14 NOTE — ED ADULT NURSE REASSESSMENT NOTE - NS ED NURSE REASSESS COMMENT FT1
Critical lab value received potassium 2.9 and lactate 5.3, Hillary LAWRENCE and Di JO made aware.
PT CIWA =11. cedrick Montes made aware and charge RN aware.
Pt remains tachy w/ elevated BP on monitor 100 % RA - pt admitted for withdrawal management - pt stable at this time nilson brandon
Pt report received, pt remains tachy on the monitor - meds given as per order pending micu consult nilson brandon
Pt a&ox4, resting in bed, sinus tach on cardiac monitor, no signs of CP, SOB, or dyspnea at this time. Respirations are even and unlabored, no signs of respiratory distress.

## 2024-03-14 NOTE — H&P ADULT - ASSESSMENT
37 yo M with pmhx of alcohol use disorder and pancreatitis comes to [facility] as a transfer from  after experiencing withdrawal like symptoms and hematemesis. On admission, vitals significant for and labs significant for with CT   A/P showing  s/p  . He is now admitted for further management.  37 yo M with pmhx of alcohol use disorder and pancreatitis comes to [facility] as a transfer from  after experiencing withdrawal symptoms and hematemesis. On admission, vitals significant for tachycardia, tachypnea, and an elevated BP. Labs significant for with CT   A/P showing  s/p  . He is now admitted for further management.  37 yo M with pmhx of alcohol use disorder and pancreatitis comes to [facility] as a transfer from  after experiencing withdrawal symptoms and hematemesis. On admission, vitals significant for tachycardia, tachypnea, and an elevated BP. Labs significant for with CT A/P showing pancreatitis and possible necrotizing pancreatitis s/p fluids and ativan 10mg. He is now admitted for further management.

## 2024-03-14 NOTE — H&P ADULT - NSHPLABSRESULTS_GEN_ALL_CORE
LABS: When present labs, imaging, and ECG were personally reviewed                          15.9   15.07 )-----------( 163      ( 14 Mar 2024 07:37 )             44.2       03-14    137  |  97<L>  |  8   ----------------------------<  166<H>  3.1<L>   |  20<L>  |  0.63    Ca    6.4<LL>      14 Mar 2024 07:37  Phos  2.6     03-14  Mg     1.60     03-14    TPro  6.6  /  Alb  3.6  /  TBili  1.0  /  DBili  x   /  AST  88<H>  /  ALT  69<H>  /  AlkPhos  100  03-14       LIVER FUNCTIONS - ( 14 Mar 2024 07:37 )  Alb: 3.6 g/dL / Pro: 6.6 g/dL / ALK PHOS: 100 U/L / ALT: 69 U/L / AST: 88 U/L / GGT: x                    Urinalysis Basic - ( 14 Mar 2024 07:37 )    Color: x / Appearance: x / SG: x / pH: x  Gluc: 166 mg/dL / Ketone: x  / Bili: x / Urobili: x   Blood: x / Protein: x / Nitrite: x   Leuk Esterase: x / RBC: x / WBC x   Sq Epi: x / Non Sq Epi: x / Bacteria: x        PT/INR - ( 14 Mar 2024 07:37 )   PT: 14.9 sec;   INR: 1.34 ratio         PTT - ( 14 Mar 2024 07:37 )  PTT:28.8 sec    Lactate Trend  03-14 @ 10:30 Lactate:4.3             CAPILLARY BLOOD GLUCOSE                RADIOLOGY & ADDITIONAL TESTS: LABS: When present labs, imaging, and ECG were personally reviewed                          15.9   15.07 )-----------( 163      ( 14 Mar 2024 07:37 )             44.2       03-14    137  |  97<L>  |  8   ----------------------------<  166<H>  3.1<L>   |  20<L>  |  0.63    Ca    6.4<LL>      14 Mar 2024 07:37  Phos  2.6     03-14  Mg     1.60     03-14    TPro  6.6  /  Alb  3.6  /  TBili  1.0  /  DBili  x   /  AST  88<H>  /  ALT  69<H>  /  AlkPhos  100  03-14       LIVER FUNCTIONS - ( 14 Mar 2024 07:37 )  Alb: 3.6 g/dL / Pro: 6.6 g/dL / ALK PHOS: 100 U/L / ALT: 69 U/L / AST: 88 U/L / GGT: x                    Urinalysis Basic - ( 14 Mar 2024 07:37 )    Color: x / Appearance: x / SG: x / pH: x  Gluc: 166 mg/dL / Ketone: x  / Bili: x / Urobili: x   Blood: x / Protein: x / Nitrite: x   Leuk Esterase: x / RBC: x / WBC x   Sq Epi: x / Non Sq Epi: x / Bacteria: x        PT/INR - ( 14 Mar 2024 07:37 )   PT: 14.9 sec;   INR: 1.34 ratio         PTT - ( 14 Mar 2024 07:37 )  PTT:28.8 sec    Lactate Trend  03-14 @ 10:30 Lactate:4.3             CAPILLARY BLOOD GLUCOSE                RADIOLOGY & ADDITIONAL TESTS:    IMPRESSION:    Probable acute interstitial pancreatitis as described above.    Thickening of the distal esophagus again noted. Air-fluid level within   the esophagus. This would be better evaluated with endoscopy

## 2024-03-15 DIAGNOSIS — D69.6 THROMBOCYTOPENIA, UNSPECIFIED: ICD-10-CM

## 2024-03-15 LAB
ALBUMIN SERPL ELPH-MCNC: 3.2 G/DL — LOW (ref 3.3–5)
ALP SERPL-CCNC: 94 U/L — SIGNIFICANT CHANGE UP (ref 40–120)
ALT FLD-CCNC: 44 U/L — HIGH (ref 4–41)
ANION GAP SERPL CALC-SCNC: 13 MMOL/L — SIGNIFICANT CHANGE UP (ref 7–14)
AST SERPL-CCNC: 52 U/L — HIGH (ref 4–40)
BASOPHILS # BLD AUTO: 0.02 K/UL — SIGNIFICANT CHANGE UP (ref 0–0.2)
BASOPHILS NFR BLD AUTO: 0.2 % — SIGNIFICANT CHANGE UP (ref 0–2)
BILIRUB SERPL-MCNC: 2.1 MG/DL — HIGH (ref 0.2–1.2)
BLOOD GAS VENOUS COMPREHENSIVE RESULT: SIGNIFICANT CHANGE UP
BUN SERPL-MCNC: 6 MG/DL — LOW (ref 7–23)
CALCIUM SERPL-MCNC: 7.6 MG/DL — LOW (ref 8.4–10.5)
CHLORIDE SERPL-SCNC: 97 MMOL/L — LOW (ref 98–107)
CO2 SERPL-SCNC: 21 MMOL/L — LOW (ref 22–31)
CREAT SERPL-MCNC: 0.54 MG/DL — SIGNIFICANT CHANGE UP (ref 0.5–1.3)
EGFR: 129 ML/MIN/1.73M2 — SIGNIFICANT CHANGE UP
EOSINOPHIL # BLD AUTO: 0.03 K/UL — SIGNIFICANT CHANGE UP (ref 0–0.5)
EOSINOPHIL NFR BLD AUTO: 0.3 % — SIGNIFICANT CHANGE UP (ref 0–6)
GLUCOSE SERPL-MCNC: 104 MG/DL — HIGH (ref 70–99)
HCT VFR BLD CALC: 44.8 % — SIGNIFICANT CHANGE UP (ref 39–50)
HGB BLD-MCNC: 15.4 G/DL — SIGNIFICANT CHANGE UP (ref 13–17)
IANC: 8.53 K/UL — HIGH (ref 1.8–7.4)
IMM GRANULOCYTES NFR BLD AUTO: 0.3 % — SIGNIFICANT CHANGE UP (ref 0–0.9)
INR BLD: 1.46 RATIO — HIGH (ref 0.85–1.18)
LYMPHOCYTES # BLD AUTO: 0.92 K/UL — LOW (ref 1–3.3)
LYMPHOCYTES # BLD AUTO: 9 % — LOW (ref 13–44)
MAGNESIUM SERPL-MCNC: 2.1 MG/DL — SIGNIFICANT CHANGE UP (ref 1.6–2.6)
MCHC RBC-ENTMCNC: 31.2 PG — SIGNIFICANT CHANGE UP (ref 27–34)
MCHC RBC-ENTMCNC: 34.4 GM/DL — SIGNIFICANT CHANGE UP (ref 32–36)
MCV RBC AUTO: 90.7 FL — SIGNIFICANT CHANGE UP (ref 80–100)
MONOCYTES # BLD AUTO: 0.64 K/UL — SIGNIFICANT CHANGE UP (ref 0–0.9)
MONOCYTES NFR BLD AUTO: 6.3 % — SIGNIFICANT CHANGE UP (ref 2–14)
NEUTROPHILS # BLD AUTO: 8.53 K/UL — HIGH (ref 1.8–7.4)
NEUTROPHILS NFR BLD AUTO: 83.9 % — HIGH (ref 43–77)
NRBC # BLD: 0 /100 WBCS — SIGNIFICANT CHANGE UP (ref 0–0)
NRBC # FLD: 0 K/UL — SIGNIFICANT CHANGE UP (ref 0–0)
PHOSPHATE SERPL-MCNC: 1.7 MG/DL — LOW (ref 2.5–4.5)
PLATELET # BLD AUTO: 89 K/UL — LOW (ref 150–400)
POTASSIUM SERPL-MCNC: 4.4 MMOL/L — SIGNIFICANT CHANGE UP (ref 3.5–5.3)
POTASSIUM SERPL-SCNC: 4.4 MMOL/L — SIGNIFICANT CHANGE UP (ref 3.5–5.3)
PROT SERPL-MCNC: 6.8 G/DL — SIGNIFICANT CHANGE UP (ref 6–8.3)
PROTHROM AB SERPL-ACNC: 16.3 SEC — HIGH (ref 9.5–13)
RBC # BLD: 4.94 M/UL — SIGNIFICANT CHANGE UP (ref 4.2–5.8)
RBC # FLD: 13.2 % — SIGNIFICANT CHANGE UP (ref 10.3–14.5)
SODIUM SERPL-SCNC: 131 MMOL/L — LOW (ref 135–145)
WBC # BLD: 10.17 K/UL — SIGNIFICANT CHANGE UP (ref 3.8–10.5)
WBC # FLD AUTO: 10.17 K/UL — SIGNIFICANT CHANGE UP (ref 3.8–10.5)

## 2024-03-15 PROCEDURE — 99222 1ST HOSP IP/OBS MODERATE 55: CPT

## 2024-03-15 PROCEDURE — 99233 SBSQ HOSP IP/OBS HIGH 50: CPT | Mod: GC

## 2024-03-15 RX ORDER — POTASSIUM PHOSPHATE, MONOBASIC POTASSIUM PHOSPHATE, DIBASIC 236; 224 MG/ML; MG/ML
30 INJECTION, SOLUTION INTRAVENOUS ONCE
Refills: 0 | Status: COMPLETED | OUTPATIENT
Start: 2024-03-15 | End: 2024-03-15

## 2024-03-15 RX ORDER — KETOROLAC TROMETHAMINE 30 MG/ML
15 SYRINGE (ML) INJECTION ONCE
Refills: 0 | Status: DISCONTINUED | OUTPATIENT
Start: 2024-03-15 | End: 2024-03-15

## 2024-03-15 RX ORDER — INFLUENZA VIRUS VACCINE 15; 15; 15; 15 UG/.5ML; UG/.5ML; UG/.5ML; UG/.5ML
0.5 SUSPENSION INTRAMUSCULAR ONCE
Refills: 0 | Status: DISCONTINUED | OUTPATIENT
Start: 2024-03-15 | End: 2024-03-19

## 2024-03-15 RX ORDER — SODIUM CHLORIDE 9 MG/ML
1000 INJECTION, SOLUTION INTRAVENOUS
Refills: 0 | Status: DISCONTINUED | OUTPATIENT
Start: 2024-03-15 | End: 2024-03-16

## 2024-03-15 RX ADMIN — SODIUM CHLORIDE 100 MILLILITER(S): 9 INJECTION, SOLUTION INTRAVENOUS at 04:09

## 2024-03-15 RX ADMIN — Medication 15 MILLIGRAM(S): at 09:00

## 2024-03-15 RX ADMIN — Medication 200 GRAM(S): at 00:50

## 2024-03-15 RX ADMIN — Medication 100 GRAM(S): at 00:50

## 2024-03-15 RX ADMIN — SODIUM CHLORIDE 125 MILLILITER(S): 9 INJECTION, SOLUTION INTRAVENOUS at 20:24

## 2024-03-15 RX ADMIN — Medication 20 MILLIEQUIVALENT(S): at 00:50

## 2024-03-15 RX ADMIN — Medication 50 MILLIGRAM(S): at 16:41

## 2024-03-15 RX ADMIN — PANTOPRAZOLE SODIUM 40 MILLIGRAM(S): 20 TABLET, DELAYED RELEASE ORAL at 18:48

## 2024-03-15 RX ADMIN — Medication 105 MILLIGRAM(S): at 06:12

## 2024-03-15 RX ADMIN — Medication 2 MILLIGRAM(S): at 05:01

## 2024-03-15 RX ADMIN — Medication 105 MILLIGRAM(S): at 16:41

## 2024-03-15 RX ADMIN — Medication 50 MILLIGRAM(S): at 13:47

## 2024-03-15 RX ADMIN — POTASSIUM PHOSPHATE, MONOBASIC POTASSIUM PHOSPHATE, DIBASIC 83.33 MILLIMOLE(S): 236; 224 INJECTION, SOLUTION INTRAVENOUS at 04:16

## 2024-03-15 RX ADMIN — Medication 50 MILLIGRAM(S): at 18:48

## 2024-03-15 RX ADMIN — Medication 3 MILLIGRAM(S): at 09:50

## 2024-03-15 RX ADMIN — PANTOPRAZOLE SODIUM 40 MILLIGRAM(S): 20 TABLET, DELAYED RELEASE ORAL at 06:12

## 2024-03-15 RX ADMIN — Medication 50 MILLIGRAM(S): at 11:44

## 2024-03-15 RX ADMIN — Medication 1 TABLET(S): at 11:46

## 2024-03-15 RX ADMIN — SODIUM CHLORIDE 125 MILLILITER(S): 9 INJECTION, SOLUTION INTRAVENOUS at 13:48

## 2024-03-15 RX ADMIN — POTASSIUM PHOSPHATE, MONOBASIC POTASSIUM PHOSPHATE, DIBASIC 83.33 MILLIMOLE(S): 236; 224 INJECTION, SOLUTION INTRAVENOUS at 09:49

## 2024-03-15 RX ADMIN — Medication 4 MILLIGRAM(S): at 06:12

## 2024-03-15 RX ADMIN — Medication 2 MILLIGRAM(S): at 08:40

## 2024-03-15 RX ADMIN — Medication 2 MILLIGRAM(S): at 07:39

## 2024-03-15 RX ADMIN — Medication 105 MILLIGRAM(S): at 21:58

## 2024-03-15 RX ADMIN — Medication 1 MILLIGRAM(S): at 11:46

## 2024-03-15 RX ADMIN — Medication 4 MILLIGRAM(S): at 04:10

## 2024-03-15 NOTE — PROGRESS NOTE ADULT - TIME BILLING
Time-based billing (NON-critical care).     50 minutes spent on total encounter; more than 50% of the visit was spent counseling and / or coordinating care by the attending physician.  The necessity of the time spent during the encounter on this date of service was due to:     documentation in [place], reviewing chart and coordinating care with patient/residents and interdisciplinary staff (such as , social workers, etc) as well as reviewing vitals, laboratory data, radiology, medication list, consultants' recommendations and prior records. Interventions were performed as documented above.

## 2024-03-15 NOTE — PROVIDER CONTACT NOTE (OTHER) - BACKGROUND
patient admitted for acute pancreatitis, [facility] q1
patient admitted for acute pancreatitis, [facility] q1

## 2024-03-15 NOTE — PATIENT PROFILE ADULT - NSPROPTRIGHTBILLOFRIGHTS_GEN_A_NUR
patient Helical Rim Advancement Flap Text: We discussed various closure modalities with the patient, including healing by second intention, primary closure, skin graft and various flaps.  The location and configuration of the defect indicated that a helical rim advancement flap would result in the least disturbance of the position and function of the surrounding anatomic structures, and provide the best result.  The technique, its benefits, alternatives and risks were discussed with the patient.  The patient underwent the procedure as follows: The patient was positioned supine on the operating room table.  The area of the defect and the surrounding skin were anesthetized with buffered 1% lidocaine with epinephrine.  The area was washed with chlorhexidine.  Sterile drapes were applied. \\n\\nThe flap was designed, incised and elevated at the rim of the helix.  Hemostasis was achieved with spot electrodesiccation.  The flap was advanced into position to cover the primary defect and a key suture was used to secure the flap into place.  Tissue was carried over to close the defect.  Additional buried interrupted sutures were used to close the flap.  The standing cones so created by the design of the flap was removed to fat by triangulation.  Final cutaneous approximation was achieved.  The closure was manually tested and found to be sound for strength and hemostasis.

## 2024-03-15 NOTE — PROGRESS NOTE ADULT - SUBJECTIVE AND OBJECTIVE BOX
PROGRESS NOTE:   Authored by Dr. Luigi Alexandra MD     Patient is a 38y old  Male who presents with a chief complaint of Alcohol withdrawal and Hematemesis (14 Mar 2024 12:41)      SUBJECTIVE / OVERNIGHT EVENTS:  No acute events overnight. Patient has no acute complaints this morning.     MEDICATIONS  (STANDING):  folic acid 1 milliGRAM(s) Oral daily  lactated ringers. 1000 milliLiter(s) (100 mL/Hr) IV Continuous <Continuous>  LORazepam   Injectable 3 milliGRAM(s) IV Push every 4 hours  LORazepam   Injectable   IV Push   multivitamin 1 Tablet(s) Oral daily  pantoprazole  Injectable 40 milliGRAM(s) IV Push every 12 hours  thiamine IVPB 500 milliGRAM(s) IV Intermittent every 8 hours    MEDICATIONS  (PRN):  acetaminophen     Tablet .. 650 milliGRAM(s) Oral every 6 hours PRN Mild Pain (1 - 3)  ketorolac 10 milliGRAM(s) Oral every 6 hours PRN Moderate Pain (4 - 6)  LORazepam   Injectable 2 milliGRAM(s) IV Push every 1 hour PRN Symptom-triggered: each CIWA -Ar score 8 or GREATER  oxyCODONE    IR 2.5 milliGRAM(s) Oral every 6 hours PRN Severe Pain (7 - 10)      CAPILLARY BLOOD GLUCOSE        I&O's Summary      PHYSICAL EXAM:  Vital Signs Last 24 Hrs  T(C): 36.7 (15 Mar 2024 10:30), Max: 37.6 (14 Mar 2024 18:16)  T(F): 98 (15 Mar 2024 10:30), Max: 99.6 (14 Mar 2024 18:16)  HR: 131 (15 Mar 2024 10:30) (118 - 143)  BP: 120/89 (15 Mar 2024 10:30) (110/76 - 143/117)  BP(mean): --  RR: 20 (15 Mar 2024 10:30) (16 - 25)  SpO2: 100% (15 Mar 2024 10:30) (95% - 100%)    Parameters below as of 15 Mar 2024 10:30  Patient On (Oxygen Delivery Method): room air        CONSTITUTIONAL: NAD  HEET: MMM, EOMI, PERRLA  NECK: supple  RESPIRATORY: Normal respiratory effort; lungs are clear to auscultation bilaterally  CARDIOVASCULAR: Regular rate and rhythm, normal S1 and S2, no murmur/rub/gallop; No lower extremity edema; Peripheral pulses are 2+ bilaterally  ABDOMEN: Nontender to palpation, normoactive bowel sounds, no rebound/guarding; No hepatosplenomegaly  MUSCULOSKELETAL: no clubbing or cyanosis of digits; no joint swelling or tenderness to palpation  PSYCH: A+O to person, place, and time; affect appropriate  SKIN: No rash    LABS:                        15.4   10.17 )-----------( 89       ( 15 Mar 2024 07:15 )             44.8     03-15    131<L>  |  97<L>  |  6<L>  ----------------------------<  104<H>  4.4   |  21<L>  |  0.54    Ca    7.6<L>      15 Mar 2024 07:15  Phos  1.7     03-15  Mg     2.10     03-15    TPro  6.8  /  Alb  3.2<L>  /  TBili  2.1<H>  /  DBili  x   /  AST  52<H>  /  ALT  44<H>  /  AlkPhos  94  03-15    PT/INR - ( 15 Mar 2024 07:15 )   PT: 16.3 sec;   INR: 1.46 ratio         PTT - ( 14 Mar 2024 07:37 )  PTT:28.8 sec      Urinalysis Basic - ( 15 Mar 2024 07:15 )    Color: x / Appearance: x / SG: x / pH: x  Gluc: 104 mg/dL / Ketone: x  / Bili: x / Urobili: x   Blood: x / Protein: x / Nitrite: x   Leuk Esterase: x / RBC: x / WBC x   Sq Epi: x / Non Sq Epi: x / Bacteria: x          Tele Reviewed:    RADIOLOGY & ADDITIONAL TESTS:  Results Reviewed:   Imaging Personally Reviewed:  Electrocardiogram Personally Reviewed:

## 2024-03-15 NOTE — PROGRESS NOTE ADULT - PROBLEM SELECTOR PLAN 1
- pt with hx of EtOH use disorder and elevated LEs  - elevated lactate on admission   - CIWA >20 on admission with tachypnea, tachycardia, and anxiety  - s/p ativan 10mg in ED ([facility] and [facility])  - now admitted with withdrawal symptoms    PLAN  - on high risk CIWA taper with symptom triggered prn--> will transition to librium  - thiamine 500IV TID, folate, and multivitamin supplement  - SBIRT consult/recs

## 2024-03-15 NOTE — PATIENT PROFILE ADULT - FALL HARM RISK - HARM RISK INTERVENTIONS
Assistance with ambulation/Assistance OOB with selected safe patient handling equipment/Communicate Risk of Fall with Harm to all staff/Monitor for mental status changes/Monitor gait and stability/Reinforce activity limits and safety measures with patient and family/Tailored Fall Risk Interventions/Toileting schedule using arm’s reach rule for commode and bathroom/Use of alarms - bed, chair and/or voice tab/Visual Cue: Yellow wristband and red socks/Bed in lowest position, wheels locked, appropriate side rails in place/Call bell, personal items and telephone in reach/Instruct patient to call for assistance before getting out of bed or chair/Non-slip footwear when patient is out of bed/[place] to call system/Physically safe environment - no spills, clutter or unnecessary equipment/Purposeful Proactive Rounding/Room/bathroom lighting operational, light cord in reach

## 2024-03-15 NOTE — PROGRESS NOTE ADULT - PROBLEM SELECTOR PLAN 2
- acute drop in platelets noted  - may be due to alcohol use causing bone marrow suppression vs liver injury vs hematemesis episode   - continue to monitor  - transfuse for platelets >10k, 20k if febrile, and 50k if going for procedure

## 2024-03-15 NOTE — PATIENT PROFILE ADULT - NSPROPASSIVESMOKEEXPOSURE_GEN_A_NUR
mg  50 mg Oral Daily Marquis Tracy MD   50 mg at 05/18/20 1258    sodium chloride flush 0.9 % injection 10 mL  10 mL Intravenous 2 times per day Marquis Tracy MD   10 mL at 05/18/20 1301    sodium chloride flush 0.9 % injection 10 mL  10 mL Intravenous PRN Marquis Tracy MD   10 mL at 05/18/20 0239    enoxaparin (LOVENOX) injection 40 mg  40 mg Subcutaneous Daily Marquis Tracy MD        metronidazole (FLAGYL) 500 mg in NaCl 100 mL IVPB premix  500 mg Intravenous Q8H Marquis Tracy  mL/hr at 05/18/20 1311 500 mg at 05/18/20 1311    cefTRIAXone (ROCEPHIN) 1 g in sodium chloride (PF) 10 mL IV syringe  1 g Intravenous Daily Marquis Tracy MD   1 g at 05/18/20 1300    oxyCODONE (ROXICODONE) immediate release tablet 5 mg  5 mg Oral Q4H PRN MD Gigi Mahmood oxyCODONE (ROXICODONE) immediate release tablet 10 mg  10 mg Oral Q4H PRN Marquis Tracy MD        morphine (PF) injection 2 mg  2 mg Intravenous Q2H PRN Marquis Tracy MD        Or    morphine injection 4 mg  4 mg Intravenous Q2H PRN Marquis Tracy MD        ondansetron (ZOFRAN) injection 4 mg  4 mg Intravenous Q6H PRN Marquis Tracy MD        pantoprazole (PROTONIX) injection 40 mg  40 mg Intravenous BID Wayne Diaz, DO   40 mg at 05/18/20 1300    And    sodium chloride (PF) 0.9 % injection 10 mL  10 mL Intravenous Daily Wayne Diaz, DO        0.9% NaCl with KCl 40 mEq infusion   Intravenous Continuous Wayne Diaz  mL/hr at 05/18/20 1242      glucose (GLUTOSE) 40 % oral gel 15 g  15 g Oral PRN Wayne Diaz, DO        dextrose 50 % IV solution  12.5 g Intravenous PRN Wayne Diaz, DO        glucagon (rDNA) injection 1 mg  1 mg Intramuscular PRN Wayne Diaz, DO        dextrose 5 % solution  100 mL/hr Intravenous PRN Wayne Diaz, DO           Allergies:  No Known Allergies    Problem List:    Patient Active Problem List   Diagnosis Code    Tinea corporis B35.4    Cholecystitis K81.9    Acute CALCIUM 9.4 05/18/2020    BILITOT 0.4 05/18/2020    ALKPHOS 43 05/18/2020    AST 21 05/18/2020    ALT 27 05/18/2020       POC Tests: No results for input(s): POCGLU, POCNA, POCK, POCCL, POCBUN, POCHEMO, POCHCT in the last 72 hours. Coags:   Lab Results   Component Value Date    PROTIME 10.7 05/17/2020    INR 0.9 05/17/2020       HCG (If Applicable): No results found for: PREGTESTUR, PREGSERUM, HCG, HCGQUANT     ABGs: No results found for: PHART, PO2ART, JRV2DMW, ISR6AYQ, BEART, U7GMJBPX     Type & Screen (If Applicable):  No results found for: LABABO, LABRH    Drug/Infectious Status (If Applicable):  No results found for: HIV, HEPCAB    COVID-19 Screening (If Applicable): No results found for: COVID19      Anesthesia Evaluation  Patient summary reviewed no history of anesthetic complications:   Airway: Mallampati: I  TM distance: >3 FB   Neck ROM: full  Mouth opening: > = 3 FB Dental: normal exam         Pulmonary:Negative Pulmonary ROS breath sounds clear to auscultation      (-) not a current smoker                           Cardiovascular:    (+) hypertension:, hyperlipidemia        Rhythm: regular  Rate: normal                    Neuro/Psych:   Negative Neuro/Psych ROS              GI/Hepatic/Renal:             Endo/Other:    (+) Diabetes, . Abdominal:   (+) obese,         Vascular: negative vascular ROS. Anesthesia Plan      general     ASA 3       Induction: intravenous. MIPS: Postoperative opioids intended and Prophylactic antiemetics administered. Anesthetic plan and risks discussed with patient. Plan discussed with CRNA. DOS STAFF ADDENDUM:    Pt seen and examined, chart reviewed (including anesthesia, drug and allergy history). Anesthetic plan, risks, benefits, alternatives, and personnel involved discussed with patient. Patient verbalized an understanding and agrees to proceed.   Plan discussed with care team members and agreed upon.     Shiv Madrid MD  Staff Anesthesiologist  2:11 PM        Shiv Madrid MD   5/18/2020 No

## 2024-03-16 DIAGNOSIS — D75.89 OTHER SPECIFIED DISEASES OF BLOOD AND BLOOD-FORMING ORGANS: ICD-10-CM

## 2024-03-16 LAB
ALBUMIN SERPL ELPH-MCNC: 3.2 G/DL — LOW (ref 3.3–5)
ALP SERPL-CCNC: 88 U/L — SIGNIFICANT CHANGE UP (ref 40–120)
ALT FLD-CCNC: 30 U/L — SIGNIFICANT CHANGE UP (ref 4–41)
ANION GAP SERPL CALC-SCNC: 11 MMOL/L — SIGNIFICANT CHANGE UP (ref 7–14)
AST SERPL-CCNC: 33 U/L — SIGNIFICANT CHANGE UP (ref 4–40)
BILIRUB SERPL-MCNC: 1.4 MG/DL — HIGH (ref 0.2–1.2)
BUN SERPL-MCNC: 7 MG/DL — SIGNIFICANT CHANGE UP (ref 7–23)
CALCIUM SERPL-MCNC: 7.2 MG/DL — LOW (ref 8.4–10.5)
CHLORIDE SERPL-SCNC: 104 MMOL/L — SIGNIFICANT CHANGE UP (ref 98–107)
CO2 SERPL-SCNC: 21 MMOL/L — LOW (ref 22–31)
CREAT SERPL-MCNC: 0.59 MG/DL — SIGNIFICANT CHANGE UP (ref 0.5–1.3)
EGFR: 126 ML/MIN/1.73M2 — SIGNIFICANT CHANGE UP
GLUCOSE SERPL-MCNC: 137 MG/DL — HIGH (ref 70–99)
HCT VFR BLD CALC: 24.1 % — LOW (ref 39–50)
HGB BLD-MCNC: 8.3 G/DL — LOW (ref 13–17)
MAGNESIUM SERPL-MCNC: 2.4 MG/DL — SIGNIFICANT CHANGE UP (ref 1.6–2.6)
MCHC RBC-ENTMCNC: 30.6 PG — SIGNIFICANT CHANGE UP (ref 27–34)
MCHC RBC-ENTMCNC: 34.4 GM/DL — SIGNIFICANT CHANGE UP (ref 32–36)
MCV RBC AUTO: 88.9 FL — SIGNIFICANT CHANGE UP (ref 80–100)
NRBC # BLD: 0 /100 WBCS — SIGNIFICANT CHANGE UP (ref 0–0)
NRBC # FLD: 0 K/UL — SIGNIFICANT CHANGE UP (ref 0–0)
PHOSPHATE SERPL-MCNC: 1.5 MG/DL — LOW (ref 2.5–4.5)
PLATELET # BLD AUTO: 105 K/UL — LOW (ref 150–400)
POTASSIUM SERPL-MCNC: 2.8 MMOL/L — CRITICAL LOW (ref 3.5–5.3)
POTASSIUM SERPL-SCNC: 2.8 MMOL/L — CRITICAL LOW (ref 3.5–5.3)
PROT SERPL-MCNC: 6.5 G/DL — SIGNIFICANT CHANGE UP (ref 6–8.3)
RBC # BLD: 2.71 M/UL — LOW (ref 4.2–5.8)
RBC # FLD: 13.3 % — SIGNIFICANT CHANGE UP (ref 10.3–14.5)
SODIUM SERPL-SCNC: 136 MMOL/L — SIGNIFICANT CHANGE UP (ref 135–145)
WBC # BLD: 7.45 K/UL — SIGNIFICANT CHANGE UP (ref 3.8–10.5)
WBC # FLD AUTO: 7.45 K/UL — SIGNIFICANT CHANGE UP (ref 3.8–10.5)

## 2024-03-16 PROCEDURE — 99232 SBSQ HOSP IP/OBS MODERATE 35: CPT | Mod: GC

## 2024-03-16 RX ORDER — HALOPERIDOL DECANOATE 100 MG/ML
5 INJECTION INTRAMUSCULAR ONCE
Refills: 0 | Status: COMPLETED | OUTPATIENT
Start: 2024-03-16 | End: 2024-03-16

## 2024-03-16 RX ORDER — FAMOTIDINE 10 MG/ML
40 INJECTION INTRAVENOUS ONCE
Refills: 0 | Status: COMPLETED | OUTPATIENT
Start: 2024-03-16 | End: 2024-03-16

## 2024-03-16 RX ORDER — HALOPERIDOL DECANOATE 100 MG/ML
5 INJECTION INTRAMUSCULAR ONCE
Refills: 0 | Status: DISCONTINUED | OUTPATIENT
Start: 2024-03-16 | End: 2024-03-16

## 2024-03-16 RX ORDER — HALOPERIDOL DECANOATE 100 MG/ML
5 INJECTION INTRAMUSCULAR ONCE
Refills: 0 | Status: COMPLETED | OUTPATIENT
Start: 2024-03-16 | End: 2024-03-18

## 2024-03-16 RX ORDER — THIAMINE MONONITRATE (VIT B1) 100 MG
100 TABLET ORAL DAILY
Refills: 0 | Status: DISCONTINUED | OUTPATIENT
Start: 2024-03-18 | End: 2024-03-19

## 2024-03-16 RX ORDER — DIPHENHYDRAMINE HCL 50 MG
50 CAPSULE ORAL ONCE
Refills: 0 | Status: COMPLETED | OUTPATIENT
Start: 2024-03-16 | End: 2024-03-16

## 2024-03-16 RX ORDER — KETOROLAC TROMETHAMINE 30 MG/ML
15 SYRINGE (ML) INJECTION ONCE
Refills: 0 | Status: DISCONTINUED | OUTPATIENT
Start: 2024-03-16 | End: 2024-03-16

## 2024-03-16 RX ORDER — POTASSIUM CHLORIDE 20 MEQ
40 PACKET (EA) ORAL EVERY 4 HOURS
Refills: 0 | Status: COMPLETED | OUTPATIENT
Start: 2024-03-16 | End: 2024-03-16

## 2024-03-16 RX ORDER — POTASSIUM PHOSPHATE, MONOBASIC POTASSIUM PHOSPHATE, DIBASIC 236; 224 MG/ML; MG/ML
30 INJECTION, SOLUTION INTRAVENOUS ONCE
Refills: 0 | Status: COMPLETED | OUTPATIENT
Start: 2024-03-16 | End: 2024-03-16

## 2024-03-16 RX ADMIN — Medication 1.5 MILLIGRAM(S): at 22:18

## 2024-03-16 RX ADMIN — Medication 40 MILLIEQUIVALENT(S): at 12:00

## 2024-03-16 RX ADMIN — Medication 1 TABLET(S): at 11:57

## 2024-03-16 RX ADMIN — FAMOTIDINE 40 MILLIGRAM(S): 10 INJECTION INTRAVENOUS at 10:01

## 2024-03-16 RX ADMIN — Medication 50 MILLIGRAM(S): at 05:52

## 2024-03-16 RX ADMIN — Medication 2 MILLIGRAM(S): at 10:11

## 2024-03-16 RX ADMIN — Medication 1 MILLIGRAM(S): at 11:57

## 2024-03-16 RX ADMIN — Medication 105 MILLIGRAM(S): at 14:50

## 2024-03-16 RX ADMIN — PANTOPRAZOLE SODIUM 40 MILLIGRAM(S): 20 TABLET, DELAYED RELEASE ORAL at 18:34

## 2024-03-16 RX ADMIN — Medication 2 MILLIGRAM(S): at 13:49

## 2024-03-16 RX ADMIN — Medication 2 MILLIGRAM(S): at 12:51

## 2024-03-16 RX ADMIN — PANTOPRAZOLE SODIUM 40 MILLIGRAM(S): 20 TABLET, DELAYED RELEASE ORAL at 05:53

## 2024-03-16 RX ADMIN — Medication 2 MILLIGRAM(S): at 14:49

## 2024-03-16 RX ADMIN — Medication 2 MILLIGRAM(S): at 11:53

## 2024-03-16 RX ADMIN — Medication 40 MILLIEQUIVALENT(S): at 06:35

## 2024-03-16 RX ADMIN — Medication 15 MILLIGRAM(S): at 08:25

## 2024-03-16 RX ADMIN — SODIUM CHLORIDE 125 MILLILITER(S): 9 INJECTION, SOLUTION INTRAVENOUS at 05:16

## 2024-03-16 RX ADMIN — Medication 2 MILLIGRAM(S): at 18:33

## 2024-03-16 RX ADMIN — POTASSIUM PHOSPHATE, MONOBASIC POTASSIUM PHOSPHATE, DIBASIC 83.33 MILLIMOLE(S): 236; 224 INJECTION, SOLUTION INTRAVENOUS at 08:26

## 2024-03-16 RX ADMIN — Medication 105 MILLIGRAM(S): at 22:22

## 2024-03-16 RX ADMIN — Medication 105 MILLIGRAM(S): at 05:52

## 2024-03-16 RX ADMIN — Medication 15 MILLIGRAM(S): at 08:40

## 2024-03-16 RX ADMIN — Medication 50 MILLIGRAM(S): at 13:19

## 2024-03-16 RX ADMIN — HALOPERIDOL DECANOATE 5 MILLIGRAM(S): 100 INJECTION INTRAMUSCULAR at 13:21

## 2024-03-16 RX ADMIN — Medication 50 MILLIGRAM(S): at 00:01

## 2024-03-16 NOTE — CHART NOTE - NSCHARTNOTEFT_GEN_A_CORE
Code ANASTASIYA called. On arrival, patient attempting to leave bed. Now beginning to refuse his PO librium taper per nursing staff. There is concern his alcohol withdrawal is worsening. Decision made to administer IV ativan 2 IV. Discussed with primary team to consider switching PO medications to IV as he appears to be in the window of alcohol withdrawal where he may worsen since his last drink was Thursday night.

## 2024-03-16 NOTE — PROGRESS NOTE ADULT - PROBLEM SELECTOR PLAN 1
- pt with hx of EtOH use disorder and elevated LEs  - elevated lactate on admission   - CIWA >20 on admission with tachypnea, tachycardia, and anxiety  - s/p ativan 10mg in ED ([facility] and [facility])  - now admitted with withdrawal symptoms    PLAN  - on high risk CIWA taper with symptom triggered prn--> transitioned to librium  - CIWA improved 4-5   - thiamine 500IV TID, folate, and multivitamin supplement  - SBIRT consult/recs

## 2024-03-16 NOTE — DISCHARGE NOTE PROVIDER - NSFOLLOWUPCLINICS_GEN_ALL_ED_FT
[facility]  Gastroenterology  [address] [place] [address]  Phone: [phone]  Fax: [phone]

## 2024-03-16 NOTE — DISCHARGE NOTE PROVIDER - NSDCCPTREATMENT_GEN_ALL_CORE_FT
PRINCIPAL PROCEDURE  Procedure: CT abdomen pelvis  Findings and Treatment:   Probable acute interstitial pancreatitis as described above.  Thickening of the distal esophagus again noted. Air-fluid level within   the esophagus. This would be better evaluated with endoscopy

## 2024-03-16 NOTE — DISCHARGE NOTE PROVIDER - CARE PROVIDER_API CALL
Internal, Medicine  [address] [facility], [address]  [place] [address]  Phone: [phone]  Fax: (   )    -  Follow Up Time:

## 2024-03-16 NOTE — PROGRESS NOTE ADULT - PROBLEM SELECTOR PLAN 2
- acute drop in hemoglobin and platelets  - may be due to bone marrow suppression from alcohol use and dilutional effect from fluids  - no gross blood loss noted   - f/u repeat CBC in afternoon

## 2024-03-16 NOTE — DISCHARGE NOTE PROVIDER - HOSPITAL COURSE
37 yo M with pmhx of pancreatitis and alcohol use disorder comes to the ED as a transfer from  after experiencing hematemesis. The patient began experiencing epigastric pain that started 3/11/24 after he consumed alcohol. The patient drinks approximately 10-20 cans of beer per day and his last drink was the morning prior to his presentation to . He also began experiencing hematemesis prior to coming into the hospital. Of note, the patient has had multiple admissions for alcohol withdrawal, pancreatitis, and an admission to the ICU in  11/23 for alcohol withdrawal. His abdominal pain is epigastric and radiates to his back.       In the ED, his vitals were significant for tachycardia, tachypnea, and an elevated blood pressure and his labs showed leukocytosis, elevated hemoglobin, lipase, triglycerides, and transaminitis. A CT A/P demonstrated pancreatitis and possible necrotizing pancreatitis, along with esophageal thickening s/p 12mg ativan and 50mg librium and fluids. He is now admitted for further management.       In the hospital, the patient was placed on an initial high risk Ativan taper, which was then transitioned to a high risk PO libirum taper. His CIWAs began improving as well. On 3/16/24, a code ANASTASIYA was called as the patient was agitated and was threatening to leave, which was not recommended as he did not have capacity. For this, a 2mg ativan PRN was placed along with the libirum taper. Further, the patient was seen by GI for his hematemesis and started IV protonix BID, however an EGD was deferred until his alcohol withdrawal symptoms improved. He was also experiencing acute on chronic pancreatitis for which he received fluids and pain control medications.     At the time of discharge, the patient was stable and deemed appropriate for discharge. 37 yo M with pmhx of pancreatitis and alcohol use disorder comes to the ED as a transfer from  after experiencing hematemesis. The patient began experiencing epigastric pain that started 3/11/24 after he consumed alcohol. The patient drinks approximately 10-20 cans of beer per day and his last drink was the morning prior to his presentation to . He also began experiencing hematemesis prior to coming into the hospital. Of note, the patient has had multiple admissions for alcohol withdrawal, pancreatitis, and an admission to the ICU in  11/23 for alcohol withdrawal. His abdominal pain is epigastric and radiates to his back.     In the ED, his vitals were significant for tachycardia, tachypnea, and an elevated blood pressure and his labs showed leukocytosis, elevated hemoglobin, lipase, triglycerides, and transaminitis. A CT A/P demonstrated pancreatitis and possible necrotizing pancreatitis, along with esophageal thickening s/p 12mg ativan and 50mg librium and fluids. He is now admitted for further management.     In the hospital, the patient was placed on an initial high risk Ativan taper, which was then transitioned to a high risk PO libirum taper. His CIWAs began improving as well. On 3/16/24, a code ANASTASIYA was called as the patient was agitated and was threatening to leave, which was not recommended as he did not have capacity. For this, a 2mg ativan PRN was placed along with the libirum taper. Patient then subsequently transitioned back to ativan taper which was expedited and completed as CIWA persistently 1-2    Further, the patient was seen by GI for his hematemesis and started IV protonix BID, however an EGD was deferred to outpatient as he was stable. He was also experiencing acute on chronic pancreatitis for which he received fluids and pain control medications which has now resolved.     At the time of discharge, the patient was stable and deemed appropriate for discharge.

## 2024-03-16 NOTE — CHART NOTE - NSCHARTNOTEFT_GEN_A_CORE
Gen [facility] called for agitation. On arrival, patient attempting to leave bed Patient was given ativan and haldol. This appears to not be part of his alcohol withdrawal as his CIWAs have been improving. Will continue with current taper with additional PRNs. Code ANASTASIYA called for agitation. On arrival, patient attempting to leave bed Patient was given ativan and haldol. This may be part of his alcohol withdrawal although the CIWAs have been improving. Will change to IV ativan taper.

## 2024-03-16 NOTE — DISCHARGE NOTE PROVIDER - PROVIDER TOKENS
FREE:[LAST:[Internal],FIRST:[Medicine],PHONE:[phone]],FAX:[(   )    -],ADDRESS:[[address] [place] [address]]]

## 2024-03-16 NOTE — DISCHARGE NOTE PROVIDER - ATTENDING DISCHARGE PHYSICAL EXAMINATION:
Pt requesting discharge. Tolerating diet. Physical exam benign. Last drink 3/12. CIWAs 1-2. Can d/c ativan  Will need GI F/u outpatient for EGD. Discussed w/ patient. Hemodynamically stable for discharge        GENERAL: NAD  HEAD:  Atraumatic, Normocephalic  EYES: EOMI, conjunctiva and sclera clear  NECK: Supple  CHEST/LUNG: Clear to auscultation bilaterally; No wheeze, rhonchi, crackles or rales  HEART: Regular rate and rhythm; No murmurs, rubs, or gallops  ABDOMEN: Soft, Nontender, Nondistended; Bowel sounds present  EXTREMITIES:  2+ Peripheral Pulses, No edema  PSYCH: AAOx3  NEUROLOGY: non-focal  SKIN: Warm and dry

## 2024-03-16 NOTE — DISCHARGE NOTE PROVIDER - NSDCMRMEDTOKEN_GEN_ALL_CORE_FT
Multiple Vitamins with Minerals oral tablet: 1 tab(s) orally once a day   Multiple Vitamins with Minerals oral tablet: 1 tab(s) orally once a day  Protonix 40 mg oral delayed release tablet: 1 tab(s) orally once a day

## 2024-03-16 NOTE — PROGRESS NOTE ADULT - SUBJECTIVE AND OBJECTIVE BOX
PROGRESS NOTE:   Authored by Dr. Luigi Alexandra MD     Patient is a 38y old  Male who presents with a chief complaint of Alcohol withdrawal and Hematemesis (15 Mar 2024 11:19)      SUBJECTIVE / OVERNIGHT EVENTS:  No acute events overnight. Patient has no acute complaints this morning.     MEDICATIONS  (STANDING):  chlordiazePOXIDE 50 milliGRAM(s) Oral every 8 hours  chlordiazePOXIDE   Oral   folic acid 1 milliGRAM(s) Oral daily  influenza   Vaccine 0.5 milliLiter(s) IntraMuscular once  lactated ringers. 1000 milliLiter(s) (125 mL/Hr) IV Continuous <Continuous>  multivitamin 1 Tablet(s) Oral daily  pantoprazole  Injectable 40 milliGRAM(s) IV Push every 12 hours  potassium chloride    Tablet ER 40 milliEquivalent(s) Oral every 4 hours  thiamine IVPB 500 milliGRAM(s) IV Intermittent every 8 hours    MEDICATIONS  (PRN):  acetaminophen     Tablet .. 650 milliGRAM(s) Oral every 6 hours PRN Mild Pain (1 - 3)  chlordiazePOXIDE 50 milliGRAM(s) Oral every 1 hour PRN Symptom-triggered: each CIWA -Ar score 8 or GREATER  ketorolac 10 milliGRAM(s) Oral every 6 hours PRN Moderate Pain (4 - 6)  oxyCODONE    IR 2.5 milliGRAM(s) Oral every 6 hours PRN Severe Pain (7 - 10)      CAPILLARY BLOOD GLUCOSE        I&O's Summary      PHYSICAL EXAM:  Vital Signs Last 24 Hrs  T(C): 36.8 (16 Mar 2024 08:07), Max: 36.8 (15 Mar 2024 21:40)  T(F): 98.3 (16 Mar 2024 08:07), Max: 98.3 (16 Mar 2024 08:07)  HR: 97 (16 Mar 2024 08:07) (88 - 143)  BP: 117/90 (16 Mar 2024 08:07) (103/60 - 139/96)  BP(mean): --  RR: 17 (16 Mar 2024 08:07) (16 - 23)  SpO2: 98% (16 Mar 2024 08:07) (95% - 100%)    Parameters below as of 16 Mar 2024 08:07  Patient On (Oxygen Delivery Method): room air        CONSTITUTIONAL: NAD  HEET: MMM, EOMI, PERRLA  NECK: supple  RESPIRATORY: Normal respiratory effort; lungs are clear to auscultation bilaterally  CARDIOVASCULAR: Regular rate and rhythm, normal S1 and S2, no murmur/rub/gallop; No lower extremity edema; Peripheral pulses are 2+ bilaterally  ABDOMEN: Nontender to palpation, normoactive bowel sounds, no rebound/guarding; No hepatosplenomegaly  MUSCULOSKELETAL: no clubbing or cyanosis of digits; no joint swelling or tenderness to palpation  PSYCH: A+O to person, place, and time; affect appropriate  SKIN: No rash    LABS:                        8.3    7.45  )-----------( 105      ( 16 Mar 2024 05:11 )             24.1     03-16    136  |  104  |  7   ----------------------------<  137<H>  2.8<LL>   |  21<L>  |  0.59    Ca    7.2<L>      16 Mar 2024 05:11  Phos  1.5     03-16  Mg     2.40     03-16    TPro  6.5  /  Alb  3.2<L>  /  TBili  1.4<H>  /  DBili  x   /  AST  33  /  ALT  30  /  AlkPhos  88  03-16    PT/INR - ( 15 Mar 2024 07:15 )   PT: 16.3 sec;   INR: 1.46 ratio               Urinalysis Basic - ( 16 Mar 2024 05:11 )    Color: x / Appearance: x / SG: x / pH: x  Gluc: 137 mg/dL / Ketone: x  / Bili: x / Urobili: x   Blood: x / Protein: x / Nitrite: x   Leuk Esterase: x / RBC: x / WBC x   Sq Epi: x / Non Sq Epi: x / Bacteria: x          Tele Reviewed:    RADIOLOGY & ADDITIONAL TESTS:  Results Reviewed:   Imaging Personally Reviewed:  Electrocardiogram Personally Reviewed:

## 2024-03-16 NOTE — PROVIDER CONTACT NOTE (CRITICAL VALUE NOTIFICATION) - DATE AND TIME:
demonstrated adequate mastication and oral clearing with small amount of cracker. Overall, recommend diet advancement per MD due to ileus, recommend medication crushed in puree as able. Dietary Recommendations:   1.) Defer diet to MD due to ileus and current restrictions  2.) Recommend crushing medication as able (in puree)    Strategies: 90 degree positioning with all p.o. intake; small bites/sips; alternate textures through meal; reduce rate of intake    Treatment/Goals: Speech therapy for dysphagia tx 3-5 times per week. ST.) Pt will tolerate recommended diet without s/s of aspiration     Oral motor Exam:  Dentition: adequate   Labial/Facial: within functional limits   Lingual: within functional limits   Voice: within functional limits     Oral Phase:   Grossly within functional limits     Pharyngeal Phase:  Suspected decreased pharyngeal clearing  No overt clinical s/s of aspiration/penetration    Patient/Family Education:Education, results and recommendations given to the Pt and nurse, who verbalized understanding    Timed Code Treatment: 0 minutes    Total Treatment Time: 25 minutes    Discharge Recommendations: No suspected need for Speech/Dysphagia treatment at discharge. If patient discharges prior to next session this note will serve as a discharge summary.       Debi Hernandez M.A., [address] [place] [address]  Speech-Language Pathologist 16-Mar-2024 06:02

## 2024-03-17 LAB
ALBUMIN SERPL ELPH-MCNC: 3.4 G/DL — SIGNIFICANT CHANGE UP (ref 3.3–5)
ALP SERPL-CCNC: 113 U/L — SIGNIFICANT CHANGE UP (ref 40–120)
ALT FLD-CCNC: 31 U/L — SIGNIFICANT CHANGE UP (ref 4–41)
ANION GAP SERPL CALC-SCNC: 13 MMOL/L — SIGNIFICANT CHANGE UP (ref 7–14)
AST SERPL-CCNC: 32 U/L — SIGNIFICANT CHANGE UP (ref 4–40)
BILIRUB SERPL-MCNC: 0.7 MG/DL — SIGNIFICANT CHANGE UP (ref 0.2–1.2)
BUN SERPL-MCNC: 7 MG/DL — SIGNIFICANT CHANGE UP (ref 7–23)
CALCIUM SERPL-MCNC: 7.6 MG/DL — LOW (ref 8.4–10.5)
CHLORIDE SERPL-SCNC: 105 MMOL/L — SIGNIFICANT CHANGE UP (ref 98–107)
CO2 SERPL-SCNC: 19 MMOL/L — LOW (ref 22–31)
CREAT SERPL-MCNC: 0.57 MG/DL — SIGNIFICANT CHANGE UP (ref 0.5–1.3)
EGFR: 127 ML/MIN/1.73M2 — SIGNIFICANT CHANGE UP
GLUCOSE SERPL-MCNC: 107 MG/DL — HIGH (ref 70–99)
HCT VFR BLD CALC: 37.6 % — LOW (ref 39–50)
HGB BLD-MCNC: 13.1 G/DL — SIGNIFICANT CHANGE UP (ref 13–17)
MAGNESIUM SERPL-MCNC: 2.4 MG/DL — SIGNIFICANT CHANGE UP (ref 1.6–2.6)
MCHC RBC-ENTMCNC: 31.4 PG — SIGNIFICANT CHANGE UP (ref 27–34)
MCHC RBC-ENTMCNC: 34.8 GM/DL — SIGNIFICANT CHANGE UP (ref 32–36)
MCV RBC AUTO: 90.2 FL — SIGNIFICANT CHANGE UP (ref 80–100)
NRBC # BLD: 0 /100 WBCS — SIGNIFICANT CHANGE UP (ref 0–0)
NRBC # FLD: 0 K/UL — SIGNIFICANT CHANGE UP (ref 0–0)
PHOSPHATE SERPL-MCNC: 2.1 MG/DL — LOW (ref 2.5–4.5)
PLATELET # BLD AUTO: 112 K/UL — LOW (ref 150–400)
POTASSIUM SERPL-MCNC: 3.4 MMOL/L — LOW (ref 3.5–5.3)
POTASSIUM SERPL-SCNC: 3.4 MMOL/L — LOW (ref 3.5–5.3)
PROT SERPL-MCNC: 7.4 G/DL — SIGNIFICANT CHANGE UP (ref 6–8.3)
RBC # BLD: 4.17 M/UL — LOW (ref 4.2–5.8)
RBC # FLD: 13.4 % — SIGNIFICANT CHANGE UP (ref 10.3–14.5)
SODIUM SERPL-SCNC: 137 MMOL/L — SIGNIFICANT CHANGE UP (ref 135–145)
WBC # BLD: 4.56 K/UL — SIGNIFICANT CHANGE UP (ref 3.8–10.5)
WBC # FLD AUTO: 4.56 K/UL — SIGNIFICANT CHANGE UP (ref 3.8–10.5)

## 2024-03-17 PROCEDURE — 99232 SBSQ HOSP IP/OBS MODERATE 35: CPT

## 2024-03-17 RX ORDER — ONDANSETRON 8 MG/1
4 TABLET, FILM COATED ORAL ONCE
Refills: 0 | Status: COMPLETED | OUTPATIENT
Start: 2024-03-17 | End: 2024-03-17

## 2024-03-17 RX ORDER — ACETAMINOPHEN 500 MG
1000 TABLET ORAL ONCE
Refills: 0 | Status: COMPLETED | OUTPATIENT
Start: 2024-03-17 | End: 2024-03-17

## 2024-03-17 RX ORDER — POTASSIUM CHLORIDE 20 MEQ
20 PACKET (EA) ORAL ONCE
Refills: 0 | Status: COMPLETED | OUTPATIENT
Start: 2024-03-17 | End: 2024-03-17

## 2024-03-17 RX ADMIN — Medication 1 MILLIGRAM(S): at 11:30

## 2024-03-17 RX ADMIN — Medication 20 MILLIEQUIVALENT(S): at 09:16

## 2024-03-17 RX ADMIN — Medication 650 MILLIGRAM(S): at 12:25

## 2024-03-17 RX ADMIN — Medication 1.5 MILLIGRAM(S): at 14:50

## 2024-03-17 RX ADMIN — Medication 1.5 MILLIGRAM(S): at 02:35

## 2024-03-17 RX ADMIN — Medication 1 TABLET(S): at 11:30

## 2024-03-17 RX ADMIN — Medication 650 MILLIGRAM(S): at 04:50

## 2024-03-17 RX ADMIN — Medication 1.5 MILLIGRAM(S): at 18:58

## 2024-03-17 RX ADMIN — Medication 1.5 MILLIGRAM(S): at 06:08

## 2024-03-17 RX ADMIN — ONDANSETRON 4 MILLIGRAM(S): 8 TABLET, FILM COATED ORAL at 20:02

## 2024-03-17 RX ADMIN — Medication 105 MILLIGRAM(S): at 13:55

## 2024-03-17 RX ADMIN — Medication 650 MILLIGRAM(S): at 04:00

## 2024-03-17 RX ADMIN — PANTOPRAZOLE SODIUM 40 MILLIGRAM(S): 20 TABLET, DELAYED RELEASE ORAL at 18:00

## 2024-03-17 RX ADMIN — Medication 1 MILLIGRAM(S): at 22:37

## 2024-03-17 RX ADMIN — Medication 105 MILLIGRAM(S): at 06:09

## 2024-03-17 RX ADMIN — PANTOPRAZOLE SODIUM 40 MILLIGRAM(S): 20 TABLET, DELAYED RELEASE ORAL at 06:09

## 2024-03-17 RX ADMIN — Medication 1000 MILLIGRAM(S): at 19:53

## 2024-03-17 RX ADMIN — Medication 400 MILLIGRAM(S): at 19:50

## 2024-03-17 RX ADMIN — Medication 1.5 MILLIGRAM(S): at 10:07

## 2024-03-17 RX ADMIN — Medication 650 MILLIGRAM(S): at 13:25

## 2024-03-17 NOTE — PROGRESS NOTE ADULT - SUBJECTIVE AND OBJECTIVE BOX
Bradley Ledesma MD  PGY 2 Department of Internal Medicine        Patient is a 38y old  Male who presents with a chief complaint of Alcohol withdrawal and Hematemesis (16 Mar 2024 11:23)      SUBJECTIVE / OVERNIGHT EVENTS: Pt seen and examined. No acute overnight events. Denies fevers, chills, CP, SOB, Abdominal pain, N/V, Constipation, Diarrhea        MEDICATIONS  (STANDING):  folic acid 1 milliGRAM(s) Oral daily  influenza   Vaccine 0.5 milliLiter(s) IntraMuscular once  LORazepam   Injectable 1.5 milliGRAM(s) IV Push every 4 hours  LORazepam   Injectable 1 milliGRAM(s) IV Push every 4 hours  LORazepam   Injectable   IV Push   multivitamin 1 Tablet(s) Oral daily  pantoprazole  Injectable 40 milliGRAM(s) IV Push every 12 hours  thiamine IVPB 500 milliGRAM(s) IV Intermittent every 8 hours    MEDICATIONS  (PRN):  acetaminophen     Tablet .. 650 milliGRAM(s) Oral every 6 hours PRN Mild Pain (1 - 3)  haloperidol    Injectable 5 milliGRAM(s) IV Push once PRN for agitation  ketorolac 10 milliGRAM(s) Oral every 6 hours PRN Moderate Pain (4 - 6)  LORazepam   Injectable 2 milliGRAM(s) IV Push every 1 hour PRN for CIWA>8      I&O's Summary      Vital Signs Last 24 Hrs  T(C): 36.3 (17 Mar 2024 06:17), Max: 36.8 (16 Mar 2024 08:07)  T(F): 97.4 (17 Mar 2024 06:17), Max: 98.3 (16 Mar 2024 08:07)  HR: 63 (17 Mar 2024 06:17) (63 - 99)  BP: 118/81 (17 Mar 2024 06:17) (97/77 - 131/84)  BP(mean): --  RR: 16 (17 Mar 2024 06:17) (16 - 18)  SpO2: 98% (17 Mar 2024 06:17) (96% - 100%)    Parameters below as of 17 Mar 2024 06:17  Patient On (Oxygen Delivery Method): room air        CAPILLARY BLOOD GLUCOSE          PHYSICAL EXAM:  GENERAL: NAD, lying in bed comfortably  HEAD:  Atraumatic, normocephalic  EYES: EOMI, PERRLA, conjunctiva clear, no conjunctival pallor, anicteric sclera  NECK: Supple, trachea midline, no JVD  HEART: Regular rate and rhythm, Normal S1 S2, no murmurs, rubs, or gallops  LUNGS: Unlabored respirations. Clear to ascultation bilaterally, no crackles, wheezing, or rhonchi  ABDOMEN: Soft, nondistended, nontender, no rebound or guarding, bowel sounds presents  EXTREMITIES: Warm extremities, no clubbing, cyanosis, or edema, peripheral pulses 2+ bilaterally  MUSCULOSKELETAL: No joint swelling or tenderness to palpation  NEURO: CN 2-12 grossly intact, moves all limbs spontaneously  SKIN: No rashes or lesions         LABS:                        8.3    7.45  )-----------( 105      ( 16 Mar 2024 05:11 )             24.1     Auto Eosinophil # x     / Auto Eosinophil % x     / Auto Neutrophil # x     / Auto Neutrophil % x     / BANDS % x        03-16    136  |  104  |  7   ----------------------------<  137<H>  2.8<LL>   |  21<L>  |  0.59    Ca    7.2<L>      16 Mar 2024 05:11  Mg     2.40     03-16  Phos  1.5     03-16  TPro  6.5  /  Alb  3.2<L>  /  TBili  1.4<H>  /  DBili  x   /  AST  33  /  ALT  30  /  AlkPhos  88  03-16          Urinalysis Basic - ( 16 Mar 2024 05:11 )    Color: x / Appearance: x / SG: x / pH: x  Gluc: 137 mg/dL / Ketone: x  / Bili: x / Urobili: x   Blood: x / Protein: x / Nitrite: x   Leuk Esterase: x / RBC: x / WBC x   Sq Epi: x / Non Sq Epi: x / Bacteria: x      Lactate, Blood: 4.3 mmol/L (03-14 @ 10:30)  Lactate, Blood: 5.3 mmol/L (03-14 @ 02:20)  Lactate, Blood: 6.6 mmol/L (03-13 @ 20:50) Bradley Ledesma MD  PGY 2 Department of Internal Medicine        Patient is a 38y old  Male who presents with a chief complaint of Alcohol withdrawal and Hematemesis (16 Mar 2024 11:23)      SUBJECTIVE / OVERNIGHT EVENTS: Pt seen and examined. No acute overnight events. Endorses mild epigastric pain w/o radiation in association with his hunger. Denies fevers, chills, CP, SOB, N/V, Constipation, Diarrhea        MEDICATIONS  (STANDING):  folic acid 1 milliGRAM(s) Oral daily  influenza   Vaccine 0.5 milliLiter(s) IntraMuscular once  LORazepam   Injectable 1.5 milliGRAM(s) IV Push every 4 hours  LORazepam   Injectable 1 milliGRAM(s) IV Push every 4 hours  LORazepam   Injectable   IV Push   multivitamin 1 Tablet(s) Oral daily  pantoprazole  Injectable 40 milliGRAM(s) IV Push every 12 hours  thiamine IVPB 500 milliGRAM(s) IV Intermittent every 8 hours    MEDICATIONS  (PRN):  acetaminophen     Tablet .. 650 milliGRAM(s) Oral every 6 hours PRN Mild Pain (1 - 3)  haloperidol    Injectable 5 milliGRAM(s) IV Push once PRN for agitation  ketorolac 10 milliGRAM(s) Oral every 6 hours PRN Moderate Pain (4 - 6)  LORazepam   Injectable 2 milliGRAM(s) IV Push every 1 hour PRN for CIWA>8      I&O's Summary      Vital Signs Last 24 Hrs  T(C): 36.3 (17 Mar 2024 06:17), Max: 36.8 (16 Mar 2024 08:07)  T(F): 97.4 (17 Mar 2024 06:17), Max: 98.3 (16 Mar 2024 08:07)  HR: 63 (17 Mar 2024 06:17) (63 - 99)  BP: 118/81 (17 Mar 2024 06:17) (97/77 - 131/84)  BP(mean): --  RR: 16 (17 Mar 2024 06:17) (16 - 18)  SpO2: 98% (17 Mar 2024 06:17) (96% - 100%)    Parameters below as of 17 Mar 2024 06:17  Patient On (Oxygen Delivery Method): room air        CAPILLARY BLOOD GLUCOSE          PHYSICAL EXAM:  GENERAL: NAD, lying in bed comfortably  HEAD:  Atraumatic, normocephalic  EYES: EOMI, PERRLA, conjunctiva clear, no conjunctival pallor, anicteric sclera  NECK: Supple, trachea midline, no JVD  HEART: Regular rate and rhythm, Normal S1 S2, no murmurs, rubs, or gallops  LUNGS: Unlabored respirations. Clear to ascultation bilaterally, no crackles, wheezing, or rhonchi  ABDOMEN: Soft, nondistended, mild tenderness to palpation in upper quadrants bilaterally, no rebound or guarding, bowel sounds presents  EXTREMITIES: Warm extremities, no clubbing, cyanosis, or edema, peripheral pulses 2+ bilaterally  MUSCULOSKELETAL: No joint swelling or tenderness to palpation  NEURO: CN 2-12 grossly intact, moves all limbs spontaneously  SKIN: No rashes or lesions         LABS:                        8.3    7.45  )-----------( 105      ( 16 Mar 2024 05:11 )             24.1     Auto Eosinophil # x     / Auto Eosinophil % x     / Auto Neutrophil # x     / Auto Neutrophil % x     / BANDS % x        03-16    136  |  104  |  7   ----------------------------<  137<H>  2.8<LL>   |  21<L>  |  0.59    Ca    7.2<L>      16 Mar 2024 05:11  Mg     2.40     03-16  Phos  1.5     03-16  TPro  6.5  /  Alb  3.2<L>  /  TBili  1.4<H>  /  DBili  x   /  AST  33  /  ALT  30  /  AlkPhos  88  03-16          Urinalysis Basic - ( 16 Mar 2024 05:11 )    Color: x / Appearance: x / SG: x / pH: x  Gluc: 137 mg/dL / Ketone: x  / Bili: x / Urobili: x   Blood: x / Protein: x / Nitrite: x   Leuk Esterase: x / RBC: x / WBC x   Sq Epi: x / Non Sq Epi: x / Bacteria: x      Lactate, Blood: 4.3 mmol/L (03-14 @ 10:30)  Lactate, Blood: 5.3 mmol/L (03-14 @ 02:20)  Lactate, Blood: 6.6 mmol/L (03-13 @ 20:50)

## 2024-03-17 NOTE — PROGRESS NOTE ADULT - PROBLEM SELECTOR PLAN 2
- acute drop in hemoglobin and platelets  - may be due to bone marrow suppression from alcohol use and dilutional effect from fluids  - no gross blood loss noted - acute drop in hemoglobin and platelets, improving; Hb now within normal limits and platelets uptrending  - may be due to bone marrow suppression from alcohol use and dilutional effect from fluids  - no gross blood loss noted

## 2024-03-17 NOTE — PROVIDER CONTACT NOTE (OTHER) - ASSESSMENT
patient . no acute distress noted at this time, CIWA score 8, patient denied chest pain, SOB, palpitations
patient , /96. no acute distress noted at this time, CIWA score 5, patient denied chest pain
Patient admitted with etoh withdrawal and acute pancreatitis. Hx of alcohol abuse, and HTN

## 2024-03-17 NOTE — PROGRESS NOTE ADULT - PROBLEM SELECTOR PLAN 1
- pt with hx of EtOH use disorder and elevated LEs  - elevated lactate on admission   - CIWA >20 on admission with tachypnea, tachycardia, and anxiety  - s/p ativan 10mg in ED ([facility] and [facility])  - now admitted with withdrawal symptoms    PLAN  - on high risk CIWA taper with symptom triggered prn--> transitioned to librium  - CIWA improved 4-5   - thiamine 500IV TID, folate, and multivitamin supplement  - SBIRT consult/recs - pt with hx of EtOH use disorder and elevated LEs  - elevated lactate on admission   - CIWA >20 on admission with tachypnea, tachycardia, and anxiety  - s/p ativan 10mg in ED ([facility] and [facility])  - now admitted with withdrawal symptoms    PLAN  - on high risk CIWA taper with symptom triggered prn--> transitioned to librium back to ativan taper given continued withdrawal and agitation requiring behavioral response team  - CIWA continues to improve; scoring 2-3 on IV ativan taper  - thiamine 500IV TID, folate, and multivitamin supplement  - SBIRT consult/recs

## 2024-03-18 LAB
ALBUMIN SERPL ELPH-MCNC: 3.3 G/DL — SIGNIFICANT CHANGE UP (ref 3.3–5)
ALBUMIN SERPL ELPH-MCNC: 3.6 G/DL — SIGNIFICANT CHANGE UP (ref 3.3–5)
ALP SERPL-CCNC: 113 U/L — SIGNIFICANT CHANGE UP (ref 40–120)
ALP SERPL-CCNC: 123 U/L — HIGH (ref 40–120)
ALT FLD-CCNC: 34 U/L — SIGNIFICANT CHANGE UP (ref 4–41)
ALT FLD-CCNC: 36 U/L — SIGNIFICANT CHANGE UP (ref 4–41)
ANION GAP SERPL CALC-SCNC: 12 MMOL/L — SIGNIFICANT CHANGE UP (ref 7–14)
ANION GAP SERPL CALC-SCNC: 13 MMOL/L — SIGNIFICANT CHANGE UP (ref 7–14)
AST SERPL-CCNC: 36 U/L — SIGNIFICANT CHANGE UP (ref 4–40)
AST SERPL-CCNC: 38 U/L — SIGNIFICANT CHANGE UP (ref 4–40)
BASOPHILS # BLD AUTO: 0.05 K/UL — SIGNIFICANT CHANGE UP (ref 0–0.2)
BASOPHILS NFR BLD AUTO: 1.1 % — SIGNIFICANT CHANGE UP (ref 0–2)
BILIRUB DIRECT SERPL-MCNC: <0.2 MG/DL — SIGNIFICANT CHANGE UP (ref 0–0.3)
BILIRUB INDIRECT FLD-MCNC: >0.2 MG/DL — SIGNIFICANT CHANGE UP (ref 0–1)
BILIRUB SERPL-MCNC: 0.4 MG/DL — SIGNIFICANT CHANGE UP (ref 0.2–1.2)
BILIRUB SERPL-MCNC: 0.6 MG/DL — SIGNIFICANT CHANGE UP (ref 0.2–1.2)
BUN SERPL-MCNC: 8 MG/DL — SIGNIFICANT CHANGE UP (ref 7–23)
BUN SERPL-MCNC: 9 MG/DL — SIGNIFICANT CHANGE UP (ref 7–23)
CALCIUM SERPL-MCNC: 8.1 MG/DL — LOW (ref 8.4–10.5)
CALCIUM SERPL-MCNC: 8.2 MG/DL — LOW (ref 8.4–10.5)
CHLORIDE SERPL-SCNC: 106 MMOL/L — SIGNIFICANT CHANGE UP (ref 98–107)
CHLORIDE SERPL-SCNC: 106 MMOL/L — SIGNIFICANT CHANGE UP (ref 98–107)
CO2 SERPL-SCNC: 19 MMOL/L — LOW (ref 22–31)
CO2 SERPL-SCNC: 20 MMOL/L — LOW (ref 22–31)
CREAT SERPL-MCNC: 0.61 MG/DL — SIGNIFICANT CHANGE UP (ref 0.5–1.3)
CREAT SERPL-MCNC: 0.63 MG/DL — SIGNIFICANT CHANGE UP (ref 0.5–1.3)
EGFR: 123 ML/MIN/1.73M2 — SIGNIFICANT CHANGE UP
EGFR: 125 ML/MIN/1.73M2 — SIGNIFICANT CHANGE UP
EOSINOPHIL # BLD AUTO: 0.07 K/UL — SIGNIFICANT CHANGE UP (ref 0–0.5)
EOSINOPHIL NFR BLD AUTO: 1.5 % — SIGNIFICANT CHANGE UP (ref 0–6)
GLUCOSE SERPL-MCNC: 110 MG/DL — HIGH (ref 70–99)
GLUCOSE SERPL-MCNC: 125 MG/DL — HIGH (ref 70–99)
HCT VFR BLD CALC: 37.3 % — LOW (ref 39–50)
HGB BLD-MCNC: 13.1 G/DL — SIGNIFICANT CHANGE UP (ref 13–17)
IANC: 3.03 K/UL — SIGNIFICANT CHANGE UP (ref 1.8–7.4)
IMM GRANULOCYTES NFR BLD AUTO: 0.2 % — SIGNIFICANT CHANGE UP (ref 0–0.9)
LYMPHOCYTES # BLD AUTO: 0.93 K/UL — LOW (ref 1–3.3)
LYMPHOCYTES # BLD AUTO: 20.3 % — SIGNIFICANT CHANGE UP (ref 13–44)
MAGNESIUM SERPL-MCNC: 2.4 MG/DL — SIGNIFICANT CHANGE UP (ref 1.6–2.6)
MAGNESIUM SERPL-MCNC: 2.4 MG/DL — SIGNIFICANT CHANGE UP (ref 1.6–2.6)
MCHC RBC-ENTMCNC: 31.6 PG — SIGNIFICANT CHANGE UP (ref 27–34)
MCHC RBC-ENTMCNC: 35.1 GM/DL — SIGNIFICANT CHANGE UP (ref 32–36)
MCV RBC AUTO: 90.1 FL — SIGNIFICANT CHANGE UP (ref 80–100)
MONOCYTES # BLD AUTO: 0.49 K/UL — SIGNIFICANT CHANGE UP (ref 0–0.9)
MONOCYTES NFR BLD AUTO: 10.7 % — SIGNIFICANT CHANGE UP (ref 2–14)
NEUTROPHILS # BLD AUTO: 3.03 K/UL — SIGNIFICANT CHANGE UP (ref 1.8–7.4)
NEUTROPHILS NFR BLD AUTO: 66.2 % — SIGNIFICANT CHANGE UP (ref 43–77)
NRBC # BLD: 0 /100 WBCS — SIGNIFICANT CHANGE UP (ref 0–0)
NRBC # FLD: 0 K/UL — SIGNIFICANT CHANGE UP (ref 0–0)
PHOSPHATE SERPL-MCNC: 3.1 MG/DL — SIGNIFICANT CHANGE UP (ref 2.5–4.5)
PHOSPHATE SERPL-MCNC: 3.2 MG/DL — SIGNIFICANT CHANGE UP (ref 2.5–4.5)
PLATELET # BLD AUTO: 143 K/UL — LOW (ref 150–400)
POTASSIUM SERPL-MCNC: 3.5 MMOL/L — SIGNIFICANT CHANGE UP (ref 3.5–5.3)
POTASSIUM SERPL-MCNC: 4.3 MMOL/L — SIGNIFICANT CHANGE UP (ref 3.5–5.3)
POTASSIUM SERPL-SCNC: 3.5 MMOL/L — SIGNIFICANT CHANGE UP (ref 3.5–5.3)
POTASSIUM SERPL-SCNC: 4.3 MMOL/L — SIGNIFICANT CHANGE UP (ref 3.5–5.3)
PROT SERPL-MCNC: 7.3 G/DL — SIGNIFICANT CHANGE UP (ref 6–8.3)
PROT SERPL-MCNC: 7.6 G/DL — SIGNIFICANT CHANGE UP (ref 6–8.3)
RBC # BLD: 4.14 M/UL — LOW (ref 4.2–5.8)
RBC # FLD: 13.5 % — SIGNIFICANT CHANGE UP (ref 10.3–14.5)
SODIUM SERPL-SCNC: 137 MMOL/L — SIGNIFICANT CHANGE UP (ref 135–145)
SODIUM SERPL-SCNC: 139 MMOL/L — SIGNIFICANT CHANGE UP (ref 135–145)
WBC # BLD: 4.58 K/UL — SIGNIFICANT CHANGE UP (ref 3.8–10.5)
WBC # FLD AUTO: 4.58 K/UL — SIGNIFICANT CHANGE UP (ref 3.8–10.5)

## 2024-03-18 PROCEDURE — 99232 SBSQ HOSP IP/OBS MODERATE 35: CPT | Mod: GC

## 2024-03-18 RX ADMIN — Medication 1 MILLIGRAM(S): at 02:11

## 2024-03-18 RX ADMIN — Medication 0.5 MILLIGRAM(S): at 22:34

## 2024-03-18 RX ADMIN — Medication 1 MILLIGRAM(S): at 10:22

## 2024-03-18 RX ADMIN — Medication 100 MILLIGRAM(S): at 14:40

## 2024-03-18 RX ADMIN — PANTOPRAZOLE SODIUM 40 MILLIGRAM(S): 20 TABLET, DELAYED RELEASE ORAL at 18:35

## 2024-03-18 RX ADMIN — PANTOPRAZOLE SODIUM 40 MILLIGRAM(S): 20 TABLET, DELAYED RELEASE ORAL at 06:20

## 2024-03-18 RX ADMIN — Medication 1 MILLIGRAM(S): at 14:40

## 2024-03-18 RX ADMIN — Medication 1 MILLIGRAM(S): at 06:18

## 2024-03-18 RX ADMIN — Medication 0.5 MILLIGRAM(S): at 18:36

## 2024-03-18 RX ADMIN — HALOPERIDOL DECANOATE 5 MILLIGRAM(S): 100 INJECTION INTRAMUSCULAR at 04:14

## 2024-03-18 RX ADMIN — Medication 1 TABLET(S): at 14:40

## 2024-03-18 NOTE — PROGRESS NOTE ADULT - PROBLEM SELECTOR PLAN 2
- acute drop in hemoglobin and platelets, improving; Hb now within normal limits and platelets uptrending  - may be due to bone marrow suppression from alcohol use and dilutional effect from fluids  - no gross blood loss noted

## 2024-03-18 NOTE — CHART NOTE - NSCHARTNOTEFT_GEN_A_CORE
Called regarding patient having abdominal pain that IV tylenol wasn't helping. Assessed patient (with [facility]  #[id number]) who stated abdominal pain was in lower half of abdomen. Last time he urinated was earlier in morning. Abdomen soft, with some discomfort in lower abdominal quadrants, and normoactive bowel sounds. Had nurse check bladder scan which was 365 mL. Patient encouraged to urinate, was able to without straight cath and pain in abdomen subsided.     Later called around midnight for patient wanting to AMA. Patient had gotten dressed up and was sitting up at edge of bed ready to go. Discussed with patient (with RN Leydi Whiting translating) that do not believe it would be safe for him to leave. He stated that he wanted to leave to see his daughter. Patient AOx3, knows he came into hospital because he drank a lot of alcohol, but unable to state the potential consequences of leaving early or the reason we were recommending him to stay in the hospital, so did not appear to have capacity at the time. Ultimately, patient was able to be convinced to stay overnight.

## 2024-03-18 NOTE — PROGRESS NOTE ADULT - PROBLEM SELECTOR PLAN 1
- pt with hx of EtOH use disorder and elevated LEs  - elevated lactate on admission   - CIWA >20 on admission with tachypnea, tachycardia, and anxiety  - s/p ativan 10mg in ED ([facility] and [facility])  - now admitted with withdrawal symptoms    PLAN  - on high risk CIWA taper with symptom triggered prn--> transitioned to librium back to ativan taper given continued withdrawal and agitation requiring behavioral response team  - CIWA continues to improve; scoring 2-3 on IV ativan taper  - thiamine 500IV TID, folate, and multivitamin supplement  - SBIRT consult/recs - pt with hx of EtOH use disorder and elevated LEs  - elevated lactate on admission   - CIWA >20 on admission with tachypnea, tachycardia, and anxiety  - s/p ativan 10mg in ED ([facility] and [facility])  - now admitted with withdrawal symptoms    PLAN  - on high risk CIWA taper with symptom triggered prn--> transitioned to librium back to ativan taper given continued withdrawal and agitation requiring behavioral response team  - CIWA continues to improve; scoring 2-3 on IV ativan taper  - thiamine 500IV TID, folate, and multivitamin supplement  - SBIRT consult/recs  - to expedite taper to allow patient to discharge as clinically stable

## 2024-03-18 NOTE — PROGRESS NOTE ADULT - SUBJECTIVE AND OBJECTIVE BOX
PROGRESS NOTE:   Authored by Capri Devi MD   Patient is a 38y old  Male who presents with a chief complaint of Alcohol withdrawal and Hematemesis (17 Mar 2024 07:50)      SUBJECTIVE / OVERNIGHT EVENTS:  No acute events overnight.     ADDITIONAL REVIEW OF SYSTEMS:    MEDICATIONS  (STANDING):  folic acid 1 milliGRAM(s) Oral daily  influenza   Vaccine 0.5 milliLiter(s) IntraMuscular once  LORazepam   Injectable 0.5 milliGRAM(s) IV Push every 4 hours  LORazepam   Injectable 1 milliGRAM(s) IV Push every 4 hours  LORazepam   Injectable   IV Push   multivitamin 1 Tablet(s) Oral daily  pantoprazole  Injectable 40 milliGRAM(s) IV Push every 12 hours  thiamine 100 milliGRAM(s) Oral daily    MEDICATIONS  (PRN):  acetaminophen     Tablet .. 650 milliGRAM(s) Oral every 6 hours PRN Mild Pain (1 - 3)  ketorolac 10 milliGRAM(s) Oral every 6 hours PRN Moderate Pain (4 - 6)  LORazepam   Injectable 2 milliGRAM(s) IV Push every 1 hour PRN for CIWA>8      CAPILLARY BLOOD GLUCOSE        I&O's Summary    17 Mar 2024 07:01  -  18 Mar 2024 07:00  --------------------------------------------------------  IN: 0 mL / OUT: 370 mL / NET: -370 mL        PHYSICAL EXAM:  Vital Signs Last 24 Hrs  T(C): 36.4 (18 Mar 2024 06:13), Max: 36.7 (17 Mar 2024 22:30)  T(F): 97.5 (18 Mar 2024 06:13), Max: 98 (17 Mar 2024 22:30)  HR: 96 (18 Mar 2024 06:13) (71 - 107)  BP: 108/76 (18 Mar 2024 06:13) (102/66 - 125/85)  BP(mean): --  RR: 17 (18 Mar 2024 06:13) (16 - 18)  SpO2: 98% (18 Mar 2024 06:13) (95% - 100%)    Parameters below as of 18 Mar 2024 06:13  Patient On (Oxygen Delivery Method): room air        GENERAL: No apparent distress.   HEAD:  Atraumatic, Normocephalic  EYES: EOMI, PERRLA, conjunctiva and sclera clear  NECK: Supple, no lymphadenopathy, no elevated JVP  CHEST/LUNG: Clear to auscultation bilateral and symmetric; No wheezes, rales, or rhonchi  HEART: S1 and S2 normal. Regular rate and rhythm; No murmurs, rubs, or gallops  ABDOMEN: Soft, non-tender, non-distended; normal bowel sounds  EXTREMITIES:  2+ peripheral pulses b/l, No clubbing, cyanosis, or edema  NEUROLOGY: A&O x 3, no focal deficits  SKIN: No rashes or lesions    LABS:                        13.1   4.58  )-----------( 143      ( 18 Mar 2024 07:03 )             37.3     03-18    137  |  106  |  8   ----------------------------<  110<H>  3.5   |  19<L>  |  0.63    Ca    8.2<L>      18 Mar 2024 07:03  Phos  3.1     03-18  Mg     2.40     03-18    TPro  7.6  /  Alb  3.6  /  TBili  0.6  /  DBili  x   /  AST  38  /  ALT  36  /  AlkPhos  123<H>  03-18          Urinalysis Basic - ( 18 Mar 2024 07:03 )    Color: x / Appearance: x / SG: x / pH: x  Gluc: 110 mg/dL / Ketone: x  / Bili: x / Urobili: x   Blood: x / Protein: x / Nitrite: x   Leuk Esterase: x / RBC: x / WBC x   Sq Epi: x / Non Sq Epi: x / Bacteria: x          RADIOLOGY & ADDITIONAL TESTS:  Lab Results Reviewed   Imaging Reviewed  Electrocardiogram Reviewed   PROGRESS NOTE:   Authored by Capri Devi MD   Patient is a 38y old  Male who presents with a chief complaint of Alcohol withdrawal and Hematemesis (17 Mar 2024 07:50)      SUBJECTIVE / OVERNIGHT EVENTS:  Patient expressing desire to AMA and abdominal pain overnight. In AM evaluated with  ([id number]) no acute complaints at the time of evaluation. 1 to 1 discontinued as patient denies HI/SI.   Endorsing hunger. Given breakfast. Ambulating without assistance.     ADDITIONAL REVIEW OF SYSTEMS:  denies ha/cp/sob/n/v/d     MEDICATIONS  (STANDING):  folic acid 1 milliGRAM(s) Oral daily  influenza   Vaccine 0.5 milliLiter(s) IntraMuscular once  LORazepam   Injectable 0.5 milliGRAM(s) IV Push every 4 hours  LORazepam   Injectable 1 milliGRAM(s) IV Push every 4 hours  LORazepam   Injectable   IV Push   multivitamin 1 Tablet(s) Oral daily  pantoprazole  Injectable 40 milliGRAM(s) IV Push every 12 hours  thiamine 100 milliGRAM(s) Oral daily    MEDICATIONS  (PRN):  acetaminophen     Tablet .. 650 milliGRAM(s) Oral every 6 hours PRN Mild Pain (1 - 3)  ketorolac 10 milliGRAM(s) Oral every 6 hours PRN Moderate Pain (4 - 6)  LORazepam   Injectable 2 milliGRAM(s) IV Push every 1 hour PRN for CIWA>8      CAPILLARY BLOOD GLUCOSE        I&O's Summary    17 Mar 2024 07:01  -  18 Mar 2024 07:00  --------------------------------------------------------  IN: 0 mL / OUT: 370 mL / NET: -370 mL        PHYSICAL EXAM:  Vital Signs Last 24 Hrs  T(C): 36.4 (18 Mar 2024 06:13), Max: 36.7 (17 Mar 2024 22:30)  T(F): 97.5 (18 Mar 2024 06:13), Max: 98 (17 Mar 2024 22:30)  HR: 96 (18 Mar 2024 06:13) (71 - 107)  BP: 108/76 (18 Mar 2024 06:13) (102/66 - 125/85)  BP(mean): --  RR: 17 (18 Mar 2024 06:13) (16 - 18)  SpO2: 98% (18 Mar 2024 06:13) (95% - 100%)    Parameters below as of 18 Mar 2024 06:13  Patient On (Oxygen Delivery Method): room air        GENERAL: No apparent distress.   HEAD:  Atraumatic, Normocephalic  EYES: EOMI, PERRLA, conjunctiva and sclera clear  NECK: Supple, no lymphadenopathy, no elevated JVP  CHEST/LUNG: Clear to auscultation bilateral and symmetric; No wheezes, rales, or rhonchi  HEART: S1 and S2 normal. Regular rate and rhythm; No murmurs, rubs, or gallops  ABDOMEN: Soft, non-tender, non-distended; normal bowel sounds  EXTREMITIES:  2+ peripheral pulses b/l, No clubbing, cyanosis, or edema  NEUROLOGY: A&O x 3, no focal deficits  SKIN: No rashes or lesions    LABS:                        13.1   4.58  )-----------( 143      ( 18 Mar 2024 07:03 )             37.3     03-18    137  |  106  |  8   ----------------------------<  110<H>  3.5   |  19<L>  |  0.63    Ca    8.2<L>      18 Mar 2024 07:03  Phos  3.1     03-18  Mg     2.40     03-18    TPro  7.6  /  Alb  3.6  /  TBili  0.6  /  DBili  x   /  AST  38  /  ALT  36  /  AlkPhos  123<H>  03-18          Urinalysis Basic - ( 18 Mar 2024 07:03 )    Color: x / Appearance: x / SG: x / pH: x  Gluc: 110 mg/dL / Ketone: x  / Bili: x / Urobili: x   Blood: x / Protein: x / Nitrite: x   Leuk Esterase: x / RBC: x / WBC x   Sq Epi: x / Non Sq Epi: x / Bacteria: x          RADIOLOGY & ADDITIONAL TESTS:  Lab Results Reviewed   Imaging Reviewed  Electrocardiogram Reviewed

## 2024-03-18 NOTE — CHART NOTE - NSCHARTNOTESELECT_GEN_ALL_CORE
Abdominal Pain & wanted to leave AMA/Event Note
Code ANASTASIYA/Event Note
Code ANASTASIYA/Event Note

## 2024-03-19 VITALS
SYSTOLIC BLOOD PRESSURE: 114 MMHG | OXYGEN SATURATION: 100 % | TEMPERATURE: 97 F | HEART RATE: 99 BPM | DIASTOLIC BLOOD PRESSURE: 81 MMHG | RESPIRATION RATE: 18 BRPM

## 2024-03-19 LAB
ANION GAP SERPL CALC-SCNC: 13 MMOL/L — SIGNIFICANT CHANGE UP (ref 7–14)
BUN SERPL-MCNC: 9 MG/DL — SIGNIFICANT CHANGE UP (ref 7–23)
CALCIUM SERPL-MCNC: 8.6 MG/DL — SIGNIFICANT CHANGE UP (ref 8.4–10.5)
CHLORIDE SERPL-SCNC: 102 MMOL/L — SIGNIFICANT CHANGE UP (ref 98–107)
CO2 SERPL-SCNC: 21 MMOL/L — LOW (ref 22–31)
CREAT SERPL-MCNC: 0.63 MG/DL — SIGNIFICANT CHANGE UP (ref 0.5–1.3)
EGFR: 123 ML/MIN/1.73M2 — SIGNIFICANT CHANGE UP
GLUCOSE SERPL-MCNC: 108 MG/DL — HIGH (ref 70–99)
POTASSIUM SERPL-MCNC: 3.8 MMOL/L — SIGNIFICANT CHANGE UP (ref 3.5–5.3)
POTASSIUM SERPL-SCNC: 3.8 MMOL/L — SIGNIFICANT CHANGE UP (ref 3.5–5.3)
SODIUM SERPL-SCNC: 136 MMOL/L — SIGNIFICANT CHANGE UP (ref 135–145)

## 2024-03-19 PROCEDURE — 99239 HOSP IP/OBS DSCHRG MGMT >30: CPT

## 2024-03-19 RX ORDER — PANTOPRAZOLE SODIUM 20 MG/1
1 TABLET, DELAYED RELEASE ORAL
Qty: 30 | Refills: 0
Start: 2024-03-19

## 2024-03-19 RX ADMIN — Medication 1 TABLET(S): at 11:20

## 2024-03-19 RX ADMIN — PANTOPRAZOLE SODIUM 40 MILLIGRAM(S): 20 TABLET, DELAYED RELEASE ORAL at 05:42

## 2024-03-19 RX ADMIN — Medication 1 MILLIGRAM(S): at 11:19

## 2024-03-19 RX ADMIN — Medication 0.5 MILLIGRAM(S): at 02:30

## 2024-03-19 RX ADMIN — Medication 100 MILLIGRAM(S): at 11:20

## 2024-03-19 RX ADMIN — Medication 650 MILLIGRAM(S): at 15:25

## 2024-03-19 NOTE — PROGRESS NOTE ADULT - PROBLEM SELECTOR PLAN 5
Diet: regular   DVT ppx: SCDs (iso hematemesis)  Dispo: pending clinical course

## 2024-03-19 NOTE — PROGRESS NOTE ADULT - PROBLEM SELECTOR PROBLEM 4
Acute on chronic pancreatitis

## 2024-03-19 NOTE — PROGRESS NOTE ADULT - ASSESSMENT
39 yo M with pmhx of alcohol use disorder and pancreatitis comes to [facility] as a transfer from  after experiencing withdrawal symptoms and hematemesis. On admission, vitals significant for tachycardia, tachypnea, and an elevated BP. Labs significant for with CT A/P showing pancreatitis and possible necrotizing pancreatitis s/p fluids and ativan 10mg. He is now admitted for further management. 
37 yo M with pmhx of alcohol use disorder and pancreatitis comes to [facility] as a transfer from  after experiencing withdrawal symptoms and hematemesis. On admission, vitals significant for tachycardia, tachypnea, and an elevated BP. Labs significant for with CT A/P showing pancreatitis and possible necrotizing pancreatitis s/p fluids and ativan 10mg. He is now admitted for further management. 
39 yo M with pmhx of alcohol use disorder and pancreatitis comes to [facility] as a transfer from  after experiencing withdrawal symptoms and hematemesis. On admission, vitals significant for tachycardia, tachypnea, and an elevated BP. Labs significant for with CT A/P showing pancreatitis and possible necrotizing pancreatitis s/p fluids and ativan 10mg. He is now admitted for further management. 
37 yo M with pmhx of alcohol use disorder and pancreatitis comes to [facility] as a transfer from  after experiencing withdrawal symptoms and hematemesis. On admission, vitals significant for tachycardia, tachypnea, and an elevated BP. Labs significant for with CT A/P showing pancreatitis and possible necrotizing pancreatitis s/p fluids and ativan 10mg. He is now admitted for further management. 
37 yo M with pmhx of alcohol use disorder and pancreatitis comes to [facility] as a transfer from  after experiencing withdrawal symptoms and hematemesis. On admission, vitals significant for tachycardia, tachypnea, and an elevated BP. Labs significant for with CT A/P showing pancreatitis and possible necrotizing pancreatitis s/p fluids and ativan 10mg. He is now admitted for further management.

## 2024-03-19 NOTE — DISCHARGE NOTE NURSING/CASE MANAGEMENT/SOCIAL WORK - PATIENT PORTAL LINK FT
You can access the FollowMyHealth Patient Portal offered by [facility] by registering at the following website: [email or web address]. By joining [facility]’s FollowMyHealth portal, you will also be able to view your health information using other applications (apps) compatible with our system.

## 2024-03-19 NOTE — DISCHARGE NOTE NURSING/CASE MANAGEMENT/SOCIAL WORK - NSDCPEFALRISK_GEN_ALL_CORE
For information on Fall & Injury Prevention, visit: [email or web address] OR  [email or web address] OR  https://www.cdc.gov/steadi/patient.html

## 2024-03-19 NOTE — PROGRESS NOTE ADULT - PROBLEM SELECTOR PLAN 1
- pt with hx of EtOH use disorder and elevated LEs  - elevated lactate on admission   - CIWA >20 on admission with tachypnea, tachycardia, and anxiety  - s/p ativan 10mg in ED ([facility] and [facility])  - now admitted with withdrawal symptoms    PLAN  - on high risk CIWA taper with symptom triggered prn--> transitioned to librium back to ativan taper given continued withdrawal and agitation requiring behavioral response team  - CIWA continues to improve; scoring 2-3 on IV ativan taper  - thiamine 500IV TID, folate, and multivitamin supplement  - SBIRT consult/recs  - to expedite taper to allow patient to discharge as clinically stable - pt with hx of EtOH use disorder and elevated LEs  - elevated lactate on admission   - CIWA >20 on admission with tachypnea, tachycardia, and anxiety  - s/p ativan 10mg in ED ([facility] and FH)  - now admitted with withdrawal symptoms    PLAN  - on high risk CIWA taper with symptom triggered prn--> transitioned to librium back to ativan taper given continued withdrawal and agitation requiring behavioral response team  - CIWA continues to improve; scoring 1-2 on IV ativan taper  - thiamine 500IV TID, folate, and multivitamin supplement  - SBIRT consult/recs  - to expedite taper to allow patient to discharge as clinically stable

## 2024-03-19 NOTE — PROGRESS NOTE ADULT - REASON FOR ADMISSION
Alcohol withdrawal and Hematemesis

## 2024-03-19 NOTE — PROGRESS NOTE ADULT - PROBLEM SELECTOR PLAN 4
- pt with abdominal pain and hx of pancreatitis  - lipase elevated and CT A/P with findings of pancreatitis--> meets criteria   - 2/2 alcohol use disorder    PLAN  - advance diet as tolerated--> now on regular diet   - fluids as needed

## 2024-03-19 NOTE — PROGRESS NOTE ADULT - ATTENDING COMMENTS
Pt requesting to leave hospital overnight in setting of lower abd discomfort, which was relieved with urination. This morning pt is sleeping deeply s/p haldol yesterday and ativan. He does have a high-risk withdrawal profile given previous MICU stay. However, it may be possible to taper a bit more briskly as he is rather drowsy today and is already ~5 days out from last EtOH drink. Will continue to monitor CIWA closely to guide taper.
Pt requesting discharge. Tolerating diet. Physical exam benign. Last drink 3/12. CIWAs 1-2. Can d/c ativan     Will need GI F/u outpatient for EGD. Discussed w/ patient    D/C planning
38 y.o male with alcohol abuse, hx of pancreatitis, presenting for hematemesis, abdominal pain, found to have acute on chronic pancreatitis and alcohol withdrawal.    #Alcohol withdrawal  change ativan taper to librium taper + symptoms triggered librium per CIWA  high CIWA scores  disoriented, DOES NOT have capacity to AMA currently  -thiamine, MTV    #acute on chronic pancreatitis  #alcohol pancreatitis  -c/w IVFs, replete electrolytes PRN  -pain meds PRN  -no abdominal pain- advance diet    #Hematemesis  resolved  CT A/P thickening of the distal esophagus.   Hgb resolved  -GI consulted- deferred EGD as no significant bleeding objectively  -monitor, c/w PPI BID
38 y.o male with alcohol abuse, hx of pancreatitis, presenting for hematemesis, abdominal pain, found to have acute on chronic pancreatitis and alcohol withdrawal.    #Alcohol withdrawal  cont with ativan taper  cont to monitor CIWA  no capacity to ama currently   -thiamine, MTV    #acute on chronic pancreatitis  #alcohol pancreatitis  -improved with IVFs, replete electrolytes PRN  -pain meds PRN  -no abdominal pain- diet advanced tolerating     #Hematemesis  resolved  CT A/P thickening of the distal esophagus.   Hgb resolved  -GI consulted- deferred EGD as no significant bleeding objectively  -monitor, c/w PPI BID
38 y.o male with alcohol abuse, hx of pancreatitis, presenting for hematemesis, abdominal pain, found to have acute on chronic pancreatitis and alcohol withdrawal.  Code [place] this morning. on 1:1, required IV ativan. calm now. was demanding to leave.    #Alcohol withdrawal  Cont librium taper, 2mg IV ativan q2hr prn for CIWA >8  no capacity to ama currently   -thiamine, MTV    #acute on chronic pancreatitis  #alcohol pancreatitis  -c/w IVFs, replete electrolytes PRN  -pain meds PRN  -no abdominal pain- diet advanced.    #Hematemesis  resolved  CT A/P thickening of the distal esophagus.   Hgb resolved  -GI consulted- deferred EGD as no significant bleeding objectively  -monitor, c/w PPI BID

## 2024-03-19 NOTE — PROGRESS NOTE ADULT - SUBJECTIVE AND OBJECTIVE BOX
PROGRESS NOTE:   Authored by Capri Devi MD   Patient is a 38y old  Male who presents with a chief complaint of Alcohol withdrawal and Hematemesis (18 Mar 2024 09:12)      SUBJECTIVE / OVERNIGHT EVENTS:  No acute events overnight.     ADDITIONAL REVIEW OF SYSTEMS:    MEDICATIONS  (STANDING):  folic acid 1 milliGRAM(s) Oral daily  influenza   Vaccine 0.5 milliLiter(s) IntraMuscular once  LORazepam   Injectable 0.5 milliGRAM(s) IV Push every 12 hours  LORazepam   Injectable   IV Push   multivitamin 1 Tablet(s) Oral daily  pantoprazole  Injectable 40 milliGRAM(s) IV Push every 12 hours  thiamine 100 milliGRAM(s) Oral daily    MEDICATIONS  (PRN):  acetaminophen     Tablet .. 650 milliGRAM(s) Oral every 6 hours PRN Mild Pain (1 - 3)  ketorolac 10 milliGRAM(s) Oral every 6 hours PRN Moderate Pain (4 - 6)  LORazepam   Injectable 2 milliGRAM(s) IV Push every 1 hour PRN for CIWA>8      CAPILLARY BLOOD GLUCOSE        I&O's Summary      PHYSICAL EXAM:  Vital Signs Last 24 Hrs  T(C): 36.4 (19 Mar 2024 06:30), Max: 36.6 (18 Mar 2024 14:30)  T(F): 97.6 (19 Mar 2024 06:30), Max: 97.8 (18 Mar 2024 14:30)  HR: 90 (19 Mar 2024 06:30) (75 - 100)  BP: 104/70 (19 Mar 2024 06:30) (104/70 - 116/72)  BP(mean): --  RR: 18 (19 Mar 2024 06:30) (17 - 18)  SpO2: 100% (19 Mar 2024 06:30) (98% - 100%)    Parameters below as of 19 Mar 2024 06:30  Patient On (Oxygen Delivery Method): room air        GENERAL: No apparent distress.   HEAD:  Atraumatic, Normocephalic  EYES: EOMI, PERRLA, conjunctiva and sclera clear  NECK: Supple, no lymphadenopathy, no elevated JVP  CHEST/LUNG: Clear to auscultation bilateral and symmetric; No wheezes, rales, or rhonchi  HEART: S1 and S2 normal. Regular rate and rhythm; No murmurs, rubs, or gallops  ABDOMEN: Soft, non-tender, non-distended; normal bowel sounds  EXTREMITIES:  2+ peripheral pulses b/l, No clubbing, cyanosis, or edema  NEUROLOGY: A&O x 3, no focal deficits  SKIN: No rashes or lesions    LABS:                        13.1   4.58  )-----------( 143      ( 18 Mar 2024 07:03 )             37.3     03-18    139  |  106  |  9   ----------------------------<  125<H>  4.3   |  20<L>  |  0.61    Ca    8.1<L>      18 Mar 2024 16:50  Phos  3.2     03-18  Mg     2.40     03-18    TPro  7.3  /  Alb  3.3  /  TBili  0.4  /  DBili  <0.2  /  AST  36  /  ALT  34  /  AlkPhos  113  03-18          Urinalysis Basic - ( 18 Mar 2024 16:50 )    Color: x / Appearance: x / SG: x / pH: x  Gluc: 125 mg/dL / Ketone: x  / Bili: x / Urobili: x   Blood: x / Protein: x / Nitrite: x   Leuk Esterase: x / RBC: x / WBC x   Sq Epi: x / Non Sq Epi: x / Bacteria: x          RADIOLOGY & ADDITIONAL TESTS:  Lab Results Reviewed   Imaging Reviewed  Electrocardiogram Reviewed   PROGRESS NOTE:   Authored by Capri Devi MD   Patient is a 38y old  Male who presents with a chief complaint of Alcohol withdrawal and Hematemesis (18 Mar 2024 09:12)      SUBJECTIVE / OVERNIGHT EVENTS:  No acute events overnight.   no acute complaints  stable for discharge   discussed using  [id number]    ADDITIONAL REVIEW OF SYSTEMS:    MEDICATIONS  (STANDING):  folic acid 1 milliGRAM(s) Oral daily  influenza   Vaccine 0.5 milliLiter(s) IntraMuscular once  LORazepam   Injectable 0.5 milliGRAM(s) IV Push every 12 hours  LORazepam   Injectable   IV Push   multivitamin 1 Tablet(s) Oral daily  pantoprazole  Injectable 40 milliGRAM(s) IV Push every 12 hours  thiamine 100 milliGRAM(s) Oral daily    MEDICATIONS  (PRN):  acetaminophen     Tablet .. 650 milliGRAM(s) Oral every 6 hours PRN Mild Pain (1 - 3)  ketorolac 10 milliGRAM(s) Oral every 6 hours PRN Moderate Pain (4 - 6)  LORazepam   Injectable 2 milliGRAM(s) IV Push every 1 hour PRN for CIWA>8      CAPILLARY BLOOD GLUCOSE        I&O's Summary      PHYSICAL EXAM:  Vital Signs Last 24 Hrs  T(C): 36.4 (19 Mar 2024 06:30), Max: 36.6 (18 Mar 2024 14:30)  T(F): 97.6 (19 Mar 2024 06:30), Max: 97.8 (18 Mar 2024 14:30)  HR: 90 (19 Mar 2024 06:30) (75 - 100)  BP: 104/70 (19 Mar 2024 06:30) (104/70 - 116/72)  BP(mean): --  RR: 18 (19 Mar 2024 06:30) (17 - 18)  SpO2: 100% (19 Mar 2024 06:30) (98% - 100%)    Parameters below as of 19 Mar 2024 06:30  Patient On (Oxygen Delivery Method): room air        GENERAL: No apparent distress.   HEAD:  Atraumatic, Normocephalic  EYES: EOMI, PERRLA, conjunctiva and sclera clear  NECK: Supple, no lymphadenopathy, no elevated JVP  CHEST/LUNG: Clear to auscultation bilateral and symmetric; No wheezes, rales, or rhonchi  HEART: S1 and S2 normal. Regular rate and rhythm; No murmurs, rubs, or gallops  ABDOMEN: Soft, non-tender, non-distended; normal bowel sounds  EXTREMITIES:  2+ peripheral pulses b/l, No clubbing, cyanosis, or edema  NEUROLOGY: A&O x 3, no focal deficits  SKIN: No rashes or lesions    LABS:                        13.1   4.58  )-----------( 143      ( 18 Mar 2024 07:03 )             37.3     03-18    139  |  106  |  9   ----------------------------<  125<H>  4.3   |  20<L>  |  0.61    Ca    8.1<L>      18 Mar 2024 16:50  Phos  3.2     03-18  Mg     2.40     03-18    TPro  7.3  /  Alb  3.3  /  TBili  0.4  /  DBili  <0.2  /  AST  36  /  ALT  34  /  AlkPhos  113  03-18          Urinalysis Basic - ( 18 Mar 2024 16:50 )    Color: x / Appearance: x / SG: x / pH: x  Gluc: 125 mg/dL / Ketone: x  / Bili: x / Urobili: x   Blood: x / Protein: x / Nitrite: x   Leuk Esterase: x / RBC: x / WBC x   Sq Epi: x / Non Sq Epi: x / Bacteria: x          RADIOLOGY & ADDITIONAL TESTS:  Lab Results Reviewed   Imaging Reviewed  Electrocardiogram Reviewed

## 2024-03-19 NOTE — PROGRESS NOTE ADULT - PROBLEM SELECTOR PLAN 3
- pt with also episodes of hematemesis   - HD and Hb stable (Hb elevated on admission, likely due to hemoconcentration)  - likely 2/2 PUD vs gastropathy vs esophagitis   - no hx of cirrhosis nor varices     PLAN  - PPI IV BID  - maintain active T/S  - transfuse for  Hb>7  - appreciate GI recs--> consider scope when withdrawal symptoms improve

## 2024-06-12 ENCOUNTER — INPATIENT (INPATIENT)
Facility: HOSPITAL | Age: 39
LOS: 5 days | Discharge: HOME | DRG: 438 | End: 2024-06-18
Attending: STUDENT IN AN ORGANIZED HEALTH CARE EDUCATION/TRAINING PROGRAM | Admitting: STUDENT IN AN ORGANIZED HEALTH CARE EDUCATION/TRAINING PROGRAM
Payer: MEDICAID

## 2024-06-12 VITALS
DIASTOLIC BLOOD PRESSURE: 93 MMHG | WEIGHT: 160.06 LBS | HEART RATE: 106 BPM | HEIGHT: 66 IN | OXYGEN SATURATION: 98 % | SYSTOLIC BLOOD PRESSURE: 134 MMHG | RESPIRATION RATE: 19 BRPM | TEMPERATURE: 98 F

## 2024-06-12 DIAGNOSIS — R74.01 ELEVATION OF LEVELS OF LIVER TRANSAMINASE LEVELS: ICD-10-CM

## 2024-06-12 DIAGNOSIS — K85.90 ACUTE PANCREATITIS WITHOUT NECROSIS OR INFECTION, UNSPECIFIED: ICD-10-CM

## 2024-06-12 DIAGNOSIS — F10.939 ALCOHOL USE, UNSPECIFIED WITH WITHDRAWAL, UNSPECIFIED: ICD-10-CM

## 2024-06-12 DIAGNOSIS — K92.2 GASTROINTESTINAL HEMORRHAGE, UNSPECIFIED: ICD-10-CM

## 2024-06-12 DIAGNOSIS — Z29.9 ENCOUNTER FOR PROPHYLACTIC MEASURES, UNSPECIFIED: ICD-10-CM

## 2024-06-12 PROBLEM — F10.10 ALCOHOL ABUSE, UNCOMPLICATED: Chronic | Status: ACTIVE | Noted: 2024-03-14

## 2024-06-12 LAB
ALBUMIN SERPL ELPH-MCNC: 3.1 G/DL — LOW (ref 3.5–5)
ALP SERPL-CCNC: 378 U/L — HIGH (ref 40–120)
ALT FLD-CCNC: 115 U/L DA — HIGH (ref 10–60)
ANION GAP SERPL CALC-SCNC: 10 MMOL/L — SIGNIFICANT CHANGE UP (ref 5–17)
APTT BLD: 29.8 SEC — SIGNIFICANT CHANGE UP (ref 24.5–35.6)
AST SERPL-CCNC: 272 U/L — HIGH (ref 10–40)
BASOPHILS # BLD AUTO: 0.08 K/UL — SIGNIFICANT CHANGE UP (ref 0–0.2)
BASOPHILS NFR BLD AUTO: 1.9 % — SIGNIFICANT CHANGE UP (ref 0–2)
BILIRUB SERPL-MCNC: 1.3 MG/DL — HIGH (ref 0.2–1.2)
BUN SERPL-MCNC: 4 MG/DL — LOW (ref 7–18)
CALCIUM SERPL-MCNC: 8.2 MG/DL — LOW (ref 8.4–10.5)
CHLORIDE SERPL-SCNC: 95 MMOL/L — LOW (ref 96–108)
CO2 SERPL-SCNC: 29 MMOL/L — SIGNIFICANT CHANGE UP (ref 22–31)
CREAT SERPL-MCNC: 0.77 MG/DL — SIGNIFICANT CHANGE UP (ref 0.5–1.3)
EGFR: 115 ML/MIN/1.73M2 — SIGNIFICANT CHANGE UP
EOSINOPHIL # BLD AUTO: 0.01 K/UL — SIGNIFICANT CHANGE UP (ref 0–0.5)
EOSINOPHIL NFR BLD AUTO: 0.2 % — SIGNIFICANT CHANGE UP (ref 0–6)
ETHANOL SERPL-MCNC: 390 MG/DL — HIGH (ref 0–10)
GLUCOSE SERPL-MCNC: 159 MG/DL — HIGH (ref 70–99)
HCT VFR BLD CALC: 41.2 % — SIGNIFICANT CHANGE UP (ref 39–50)
HGB BLD-MCNC: 14.7 G/DL — SIGNIFICANT CHANGE UP (ref 13–17)
IMM GRANULOCYTES NFR BLD AUTO: 0.5 % — SIGNIFICANT CHANGE UP (ref 0–0.9)
INR BLD: 1.23 RATIO — HIGH (ref 0.85–1.18)
LIDOCAIN IGE QN: 370 U/L — HIGH (ref 13–75)
LYMPHOCYTES # BLD AUTO: 0.95 K/UL — LOW (ref 1–3.3)
LYMPHOCYTES # BLD AUTO: 23 % — SIGNIFICANT CHANGE UP (ref 13–44)
MCHC RBC-ENTMCNC: 31.5 PG — SIGNIFICANT CHANGE UP (ref 27–34)
MCHC RBC-ENTMCNC: 35.7 GM/DL — SIGNIFICANT CHANGE UP (ref 32–36)
MCV RBC AUTO: 88.4 FL — SIGNIFICANT CHANGE UP (ref 80–100)
MONOCYTES # BLD AUTO: 0.52 K/UL — SIGNIFICANT CHANGE UP (ref 0–0.9)
MONOCYTES NFR BLD AUTO: 12.6 % — SIGNIFICANT CHANGE UP (ref 2–14)
NEUTROPHILS # BLD AUTO: 2.55 K/UL — SIGNIFICANT CHANGE UP (ref 1.8–7.4)
NEUTROPHILS NFR BLD AUTO: 61.8 % — SIGNIFICANT CHANGE UP (ref 43–77)
NRBC # BLD: 0 /100 WBCS — SIGNIFICANT CHANGE UP (ref 0–0)
PLATELET # BLD AUTO: 177 K/UL — SIGNIFICANT CHANGE UP (ref 150–400)
POTASSIUM SERPL-MCNC: 3 MMOL/L — LOW (ref 3.5–5.3)
POTASSIUM SERPL-SCNC: 3 MMOL/L — LOW (ref 3.5–5.3)
PROT SERPL-MCNC: 8 G/DL — SIGNIFICANT CHANGE UP (ref 6–8.3)
PROTHROM AB SERPL-ACNC: 13.9 SEC — HIGH (ref 9.5–13)
RBC # BLD: 4.66 M/UL — SIGNIFICANT CHANGE UP (ref 4.2–5.8)
RBC # FLD: 14.8 % — HIGH (ref 10.3–14.5)
SODIUM SERPL-SCNC: 134 MMOL/L — LOW (ref 135–145)
WBC # BLD: 4.13 K/UL — SIGNIFICANT CHANGE UP (ref 3.8–10.5)
WBC # FLD AUTO: 4.13 K/UL — SIGNIFICANT CHANGE UP (ref 3.8–10.5)

## 2024-06-12 PROCEDURE — 76705 ECHO EXAM OF ABDOMEN: CPT | Mod: 26

## 2024-06-12 PROCEDURE — 74177 CT ABD & PELVIS W/CONTRAST: CPT | Mod: 26,MC

## 2024-06-12 PROCEDURE — 99223 1ST HOSP IP/OBS HIGH 75: CPT | Mod: GC

## 2024-06-12 PROCEDURE — 99285 EMERGENCY DEPT VISIT HI MDM: CPT

## 2024-06-12 RX ORDER — THIAMINE HCL 100 MG
500 TABLET ORAL EVERY 8 HOURS
Refills: 0 | Status: COMPLETED | OUTPATIENT
Start: 2024-06-12 | End: 2024-06-15

## 2024-06-12 RX ORDER — LORAZEPAM 0.5 MG
2 TABLET ORAL
Refills: 0 | Status: DISCONTINUED | OUTPATIENT
Start: 2024-06-12 | End: 2024-06-13

## 2024-06-12 RX ORDER — CHLORDIAZEPOXIDE HYDROCHLORIDE 10 MG/1
50 CAPSULE ORAL ONCE
Refills: 0 | Status: DISCONTINUED | OUTPATIENT
Start: 2024-06-12 | End: 2024-06-12

## 2024-06-12 RX ORDER — MAGNESIUM SULFATE 100 %
1 POWDER (GRAM) MISCELLANEOUS ONCE
Refills: 0 | Status: COMPLETED | OUTPATIENT
Start: 2024-06-12 | End: 2024-06-12

## 2024-06-12 RX ORDER — KETOROLAC TROMETHAMINE 30 MG/ML
15 INJECTION, SOLUTION INTRAMUSCULAR ONCE
Refills: 0 | Status: DISCONTINUED | OUTPATIENT
Start: 2024-06-12 | End: 2024-06-12

## 2024-06-12 RX ORDER — CHLORDIAZEPOXIDE HYDROCHLORIDE 10 MG/1
50 CAPSULE ORAL EVERY 6 HOURS
Refills: 0 | Status: DISCONTINUED | OUTPATIENT
Start: 2024-06-12 | End: 2024-06-13

## 2024-06-12 RX ORDER — MORPHINE SULFATE 100 MG/1
4 TABLET, EXTENDED RELEASE ORAL ONCE
Refills: 0 | Status: DISCONTINUED | OUTPATIENT
Start: 2024-06-12 | End: 2024-06-12

## 2024-06-12 RX ORDER — SODIUM CHLORIDE 0.9 % (FLUSH) 0.9 %
1000 SYRINGE (ML) INJECTION ONCE
Refills: 0 | Status: COMPLETED | OUTPATIENT
Start: 2024-06-12 | End: 2024-06-12

## 2024-06-12 RX ORDER — HYDROMORPHONE HCL 0.2 MG/ML
0.5 INJECTION, SOLUTION INTRAVENOUS EVERY 6 HOURS
Refills: 0 | Status: DISCONTINUED | OUTPATIENT
Start: 2024-06-12 | End: 2024-06-15

## 2024-06-12 RX ORDER — CHLORDIAZEPOXIDE HYDROCHLORIDE 10 MG/1
CAPSULE ORAL
Refills: 0 | Status: DISCONTINUED | OUTPATIENT
Start: 2024-06-12 | End: 2024-06-13

## 2024-06-12 RX ORDER — FAMOTIDINE 40 MG
20 TABLET ORAL ONCE
Refills: 0 | Status: COMPLETED | OUTPATIENT
Start: 2024-06-12 | End: 2024-06-12

## 2024-06-12 RX ORDER — CHLORDIAZEPOXIDE HYDROCHLORIDE 10 MG/1
50 CAPSULE ORAL EVERY 8 HOURS
Refills: 0 | Status: DISCONTINUED | OUTPATIENT
Start: 2024-06-13 | End: 2024-06-13

## 2024-06-12 RX ORDER — CHLORDIAZEPOXIDE HYDROCHLORIDE 10 MG/1
CAPSULE ORAL
Refills: 0 | Status: DISCONTINUED | OUTPATIENT
Start: 2024-06-12 | End: 2024-06-12

## 2024-06-12 RX ORDER — DEXTROSE MONOHYDRATE AND SODIUM CHLORIDE 5; .3 G/100ML; G/100ML
1000 INJECTION, SOLUTION INTRAVENOUS
Refills: 0 | Status: DISCONTINUED | OUTPATIENT
Start: 2024-06-13 | End: 2024-06-13

## 2024-06-12 RX ORDER — MAGNESIUM, ALUMINUM HYDROXIDE 400-400
30 TABLET,CHEWABLE ORAL EVERY 4 HOURS
Refills: 0 | Status: DISCONTINUED | OUTPATIENT
Start: 2024-06-12 | End: 2024-06-18

## 2024-06-12 RX ORDER — METOCLOPRAMIDE 5 MG/5ML
10 SOLUTION ORAL ONCE
Refills: 0 | Status: COMPLETED | OUTPATIENT
Start: 2024-06-12 | End: 2024-06-12

## 2024-06-12 RX ORDER — DEXTROSE MONOHYDRATE AND SODIUM CHLORIDE 5; .3 G/100ML; G/100ML
1000 INJECTION, SOLUTION INTRAVENOUS
Refills: 0 | Status: DISCONTINUED | OUTPATIENT
Start: 2024-06-12 | End: 2024-06-13

## 2024-06-12 RX ORDER — CHLORDIAZEPOXIDE HYDROCHLORIDE 10 MG/1
50 CAPSULE ORAL EVERY 6 HOURS
Refills: 0 | Status: DISCONTINUED | OUTPATIENT
Start: 2024-06-12 | End: 2024-06-12

## 2024-06-12 RX ORDER — HYDROMORPHONE HCL 0.2 MG/ML
1 INJECTION, SOLUTION INTRAVENOUS EVERY 6 HOURS
Refills: 0 | Status: DISCONTINUED | OUTPATIENT
Start: 2024-06-12 | End: 2024-06-15

## 2024-06-12 RX ORDER — FOLIC ACID
1 POWDER (GRAM) MISCELLANEOUS DAILY
Refills: 0 | Status: DISCONTINUED | OUTPATIENT
Start: 2024-06-12 | End: 2024-06-13

## 2024-06-12 RX ORDER — PANTOPRAZOLE SODIUM 40 MG/10ML
40 INJECTION, POWDER, FOR SOLUTION INTRAVENOUS EVERY 12 HOURS
Refills: 0 | Status: DISCONTINUED | OUTPATIENT
Start: 2024-06-12 | End: 2024-06-16

## 2024-06-12 RX ORDER — SUCRALFATE 1 G
1 TABLET ORAL ONCE
Refills: 0 | Status: COMPLETED | OUTPATIENT
Start: 2024-06-12 | End: 2024-06-12

## 2024-06-12 RX ORDER — ONDANSETRON HYDROCHLORIDE 2 MG/ML
4 INJECTION INTRAMUSCULAR; INTRAVENOUS EVERY 8 HOURS
Refills: 0 | Status: DISCONTINUED | OUTPATIENT
Start: 2024-06-12 | End: 2024-06-18

## 2024-06-12 RX ORDER — ACETAMINOPHEN 325 MG
650 TABLET ORAL EVERY 6 HOURS
Refills: 0 | Status: DISCONTINUED | OUTPATIENT
Start: 2024-06-12 | End: 2024-06-14

## 2024-06-12 RX ORDER — POTASSIUM CHLORIDE 600 MG/1
10 TABLET, FILM COATED, EXTENDED RELEASE ORAL ONCE
Refills: 0 | Status: COMPLETED | OUTPATIENT
Start: 2024-06-12 | End: 2024-06-12

## 2024-06-12 RX ORDER — POTASSIUM CHLORIDE 600 MG/1
40 TABLET, FILM COATED, EXTENDED RELEASE ORAL ONCE
Refills: 0 | Status: COMPLETED | OUTPATIENT
Start: 2024-06-12 | End: 2024-06-12

## 2024-06-12 RX ADMIN — KETOROLAC TROMETHAMINE 15 MILLIGRAM(S): 30 INJECTION, SOLUTION INTRAMUSCULAR at 12:48

## 2024-06-12 RX ADMIN — METOCLOPRAMIDE 10 MILLIGRAM(S): 5 SOLUTION ORAL at 22:29

## 2024-06-12 RX ADMIN — KETOROLAC TROMETHAMINE 15 MILLIGRAM(S): 30 INJECTION, SOLUTION INTRAMUSCULAR at 12:18

## 2024-06-12 RX ADMIN — Medication 100 GRAM(S): at 13:10

## 2024-06-12 RX ADMIN — Medication 105 MILLIGRAM(S): at 23:29

## 2024-06-12 RX ADMIN — Medication 1 GRAM(S): at 14:10

## 2024-06-12 RX ADMIN — POTASSIUM CHLORIDE 10 MILLIEQUIVALENT(S): 600 TABLET, FILM COATED, EXTENDED RELEASE ORAL at 14:41

## 2024-06-12 RX ADMIN — Medication 2 MILLIGRAM(S): at 21:45

## 2024-06-12 RX ADMIN — DEXTROSE MONOHYDRATE AND SODIUM CHLORIDE 150 MILLILITER(S): 5; .3 INJECTION, SOLUTION INTRAVENOUS at 16:20

## 2024-06-12 RX ADMIN — Medication 1000 MILLILITER(S): at 15:15

## 2024-06-12 RX ADMIN — CHLORDIAZEPOXIDE HYDROCHLORIDE 50 MILLIGRAM(S): 10 CAPSULE ORAL at 12:15

## 2024-06-12 RX ADMIN — Medication 1000 MILLILITER(S): at 12:21

## 2024-06-12 RX ADMIN — Medication 1000 MILLILITER(S): at 13:13

## 2024-06-12 RX ADMIN — MORPHINE SULFATE 4 MILLIGRAM(S): 100 TABLET, EXTENDED RELEASE ORAL at 15:31

## 2024-06-12 RX ADMIN — CHLORDIAZEPOXIDE HYDROCHLORIDE 50 MILLIGRAM(S): 10 CAPSULE ORAL at 18:00

## 2024-06-12 RX ADMIN — MORPHINE SULFATE 4 MILLIGRAM(S): 100 TABLET, EXTENDED RELEASE ORAL at 15:16

## 2024-06-12 RX ADMIN — ONDANSETRON HYDROCHLORIDE 4 MILLIGRAM(S): 2 INJECTION INTRAMUSCULAR; INTRAVENOUS at 16:24

## 2024-06-12 RX ADMIN — POTASSIUM CHLORIDE 100 MILLIEQUIVALENT(S): 600 TABLET, FILM COATED, EXTENDED RELEASE ORAL at 13:41

## 2024-06-12 RX ADMIN — PANTOPRAZOLE SODIUM 40 MILLIGRAM(S): 40 INJECTION, POWDER, FOR SOLUTION INTRAVENOUS at 17:55

## 2024-06-12 RX ADMIN — Medication 1 GRAM(S): at 12:15

## 2024-06-12 RX ADMIN — Medication 20 MILLIGRAM(S): at 12:17

## 2024-06-12 RX ADMIN — POTASSIUM CHLORIDE 40 MILLIEQUIVALENT(S): 600 TABLET, FILM COATED, EXTENDED RELEASE ORAL at 13:10

## 2024-06-12 RX ADMIN — Medication 1000 MILLILITER(S): at 16:13

## 2024-06-13 DIAGNOSIS — Z75.8 OTHER PROBLEMS RELATED TO MEDICAL FACILITIES AND OTHER HEALTH CARE: ICD-10-CM

## 2024-06-13 LAB
ALBUMIN SERPL ELPH-MCNC: 2.3 G/DL — LOW (ref 3.5–5)
ALBUMIN SERPL ELPH-MCNC: 2.6 G/DL — LOW (ref 3.5–5)
ALP SERPL-CCNC: 206 U/L — HIGH (ref 40–120)
ALP SERPL-CCNC: 259 U/L — HIGH (ref 40–120)
ALT FLD-CCNC: 66 U/L DA — HIGH (ref 10–60)
ALT FLD-CCNC: 83 U/L DA — HIGH (ref 10–60)
ANION GAP SERPL CALC-SCNC: 6 MMOL/L — SIGNIFICANT CHANGE UP (ref 5–17)
ANION GAP SERPL CALC-SCNC: 7 MMOL/L — SIGNIFICANT CHANGE UP (ref 5–17)
AST SERPL-CCNC: 140 U/L — HIGH (ref 10–40)
AST SERPL-CCNC: 202 U/L — HIGH (ref 10–40)
BASE EXCESS BLDV CALC-SCNC: 2.5 MMOL/L — SIGNIFICANT CHANGE UP
BASOPHILS # BLD AUTO: 0.09 K/UL — SIGNIFICANT CHANGE UP (ref 0–0.2)
BASOPHILS NFR BLD AUTO: 1.4 % — SIGNIFICANT CHANGE UP (ref 0–2)
BILIRUB SERPL-MCNC: 2.1 MG/DL — HIGH (ref 0.2–1.2)
BILIRUB SERPL-MCNC: 2.5 MG/DL — HIGH (ref 0.2–1.2)
BLD GP AB SCN SERPL QL: SIGNIFICANT CHANGE UP
BUN SERPL-MCNC: 4 MG/DL — LOW (ref 7–18)
BUN SERPL-MCNC: 6 MG/DL — LOW (ref 7–18)
CALCIUM SERPL-MCNC: 7.1 MG/DL — LOW (ref 8.4–10.5)
CALCIUM SERPL-MCNC: 7.2 MG/DL — LOW (ref 8.4–10.5)
CHLORIDE SERPL-SCNC: 104 MMOL/L — SIGNIFICANT CHANGE UP (ref 96–108)
CHLORIDE SERPL-SCNC: 105 MMOL/L — SIGNIFICANT CHANGE UP (ref 96–108)
CO2 SERPL-SCNC: 25 MMOL/L — SIGNIFICANT CHANGE UP (ref 22–31)
CO2 SERPL-SCNC: 28 MMOL/L — SIGNIFICANT CHANGE UP (ref 22–31)
CREAT SERPL-MCNC: 0.61 MG/DL — SIGNIFICANT CHANGE UP (ref 0.5–1.3)
CREAT SERPL-MCNC: 0.82 MG/DL — SIGNIFICANT CHANGE UP (ref 0.5–1.3)
EGFR: 113 ML/MIN/1.73M2 — SIGNIFICANT CHANGE UP
EGFR: 124 ML/MIN/1.73M2 — SIGNIFICANT CHANGE UP
EOSINOPHIL # BLD AUTO: 0.11 K/UL — SIGNIFICANT CHANGE UP (ref 0–0.5)
EOSINOPHIL NFR BLD AUTO: 1.7 % — SIGNIFICANT CHANGE UP (ref 0–6)
GLUCOSE BLDC GLUCOMTR-MCNC: 125 MG/DL — HIGH (ref 70–99)
GLUCOSE SERPL-MCNC: 108 MG/DL — HIGH (ref 70–99)
GLUCOSE SERPL-MCNC: 157 MG/DL — HIGH (ref 70–99)
HCO3 BLDV-SCNC: 28 MMOL/L — SIGNIFICANT CHANGE UP (ref 22–29)
HCT VFR BLD CALC: 27 % — LOW (ref 39–50)
HCT VFR BLD CALC: 27.3 % — LOW (ref 39–50)
HCT VFR BLD CALC: 29.4 % — LOW (ref 39–50)
HCT VFR BLD CALC: 32.3 % — LOW (ref 39–50)
HGB BLD-MCNC: 11.1 G/DL — LOW (ref 13–17)
HGB BLD-MCNC: 9.2 G/DL — LOW (ref 13–17)
HGB BLD-MCNC: 9.2 G/DL — LOW (ref 13–17)
HGB BLD-MCNC: 9.8 G/DL — LOW (ref 13–17)
IMM GRANULOCYTES NFR BLD AUTO: 0.3 % — SIGNIFICANT CHANGE UP (ref 0–0.9)
INR BLD: 1.29 RATIO — HIGH (ref 0.85–1.18)
LYMPHOCYTES # BLD AUTO: 1.05 K/UL — SIGNIFICANT CHANGE UP (ref 1–3.3)
LYMPHOCYTES # BLD AUTO: 16.4 % — SIGNIFICANT CHANGE UP (ref 13–44)
MAGNESIUM SERPL-MCNC: 1.5 MG/DL — LOW (ref 1.6–2.6)
MCHC RBC-ENTMCNC: 31.2 PG — SIGNIFICANT CHANGE UP (ref 27–34)
MCHC RBC-ENTMCNC: 31.4 PG — SIGNIFICANT CHANGE UP (ref 27–34)
MCHC RBC-ENTMCNC: 32.1 PG — SIGNIFICANT CHANGE UP (ref 27–34)
MCHC RBC-ENTMCNC: 32.5 PG — SIGNIFICANT CHANGE UP (ref 27–34)
MCHC RBC-ENTMCNC: 33.3 GM/DL — SIGNIFICANT CHANGE UP (ref 32–36)
MCHC RBC-ENTMCNC: 33.7 GM/DL — SIGNIFICANT CHANGE UP (ref 32–36)
MCHC RBC-ENTMCNC: 34.1 GM/DL — SIGNIFICANT CHANGE UP (ref 32–36)
MCHC RBC-ENTMCNC: 34.4 GM/DL — SIGNIFICANT CHANGE UP (ref 32–36)
MCV RBC AUTO: 92.5 FL — SIGNIFICANT CHANGE UP (ref 80–100)
MCV RBC AUTO: 93.4 FL — SIGNIFICANT CHANGE UP (ref 80–100)
MCV RBC AUTO: 94.2 FL — SIGNIFICANT CHANGE UP (ref 80–100)
MCV RBC AUTO: 95.4 FL — SIGNIFICANT CHANGE UP (ref 80–100)
MONOCYTES # BLD AUTO: 0.44 K/UL — SIGNIFICANT CHANGE UP (ref 0–0.9)
MONOCYTES NFR BLD AUTO: 6.9 % — SIGNIFICANT CHANGE UP (ref 2–14)
NEUTROPHILS # BLD AUTO: 4.7 K/UL — SIGNIFICANT CHANGE UP (ref 1.8–7.4)
NEUTROPHILS NFR BLD AUTO: 73.3 % — SIGNIFICANT CHANGE UP (ref 43–77)
NRBC # BLD: 0 /100 WBCS — SIGNIFICANT CHANGE UP (ref 0–0)
PCO2 BLDV: 45 MMHG — SIGNIFICANT CHANGE UP (ref 42–55)
PH BLDV: 7.4 — SIGNIFICANT CHANGE UP (ref 7.32–7.43)
PHOSPHATE SERPL-MCNC: 1.9 MG/DL — LOW (ref 2.5–4.5)
PLATELET # BLD AUTO: 128 K/UL — LOW (ref 150–400)
PLATELET # BLD AUTO: 129 K/UL — LOW (ref 150–400)
PLATELET # BLD AUTO: 132 K/UL — LOW (ref 150–400)
PLATELET # BLD AUTO: 145 K/UL — LOW (ref 150–400)
PO2 BLDV: 47 MMHG — SIGNIFICANT CHANGE UP
POTASSIUM SERPL-MCNC: 3.1 MMOL/L — LOW (ref 3.5–5.3)
POTASSIUM SERPL-MCNC: 3.8 MMOL/L — SIGNIFICANT CHANGE UP (ref 3.5–5.3)
POTASSIUM SERPL-SCNC: 3.1 MMOL/L — LOW (ref 3.5–5.3)
POTASSIUM SERPL-SCNC: 3.8 MMOL/L — SIGNIFICANT CHANGE UP (ref 3.5–5.3)
PROT SERPL-MCNC: 5.5 G/DL — LOW (ref 6–8.3)
PROT SERPL-MCNC: 6 G/DL — SIGNIFICANT CHANGE UP (ref 6–8.3)
PROTHROM AB SERPL-ACNC: 14.6 SEC — HIGH (ref 9.5–13)
RBC # BLD: 2.83 M/UL — LOW (ref 4.2–5.8)
RBC # BLD: 2.95 M/UL — LOW (ref 4.2–5.8)
RBC # BLD: 3.12 M/UL — LOW (ref 4.2–5.8)
RBC # BLD: 3.46 M/UL — LOW (ref 4.2–5.8)
RBC # FLD: 15.3 % — HIGH (ref 10.3–14.5)
RBC # FLD: 15.5 % — HIGH (ref 10.3–14.5)
RBC # FLD: 15.6 % — HIGH (ref 10.3–14.5)
RBC # FLD: 16.5 % — HIGH (ref 10.3–14.5)
SAO2 % BLDV: 81.4 % — SIGNIFICANT CHANGE UP
SODIUM SERPL-SCNC: 137 MMOL/L — SIGNIFICANT CHANGE UP (ref 135–145)
SODIUM SERPL-SCNC: 138 MMOL/L — SIGNIFICANT CHANGE UP (ref 135–145)
WBC # BLD: 4.82 K/UL — SIGNIFICANT CHANGE UP (ref 3.8–10.5)
WBC # BLD: 4.97 K/UL — SIGNIFICANT CHANGE UP (ref 3.8–10.5)
WBC # BLD: 5.47 K/UL — SIGNIFICANT CHANGE UP (ref 3.8–10.5)
WBC # BLD: 6.41 K/UL — SIGNIFICANT CHANGE UP (ref 3.8–10.5)
WBC # FLD AUTO: 4.82 K/UL — SIGNIFICANT CHANGE UP (ref 3.8–10.5)
WBC # FLD AUTO: 4.97 K/UL — SIGNIFICANT CHANGE UP (ref 3.8–10.5)
WBC # FLD AUTO: 5.47 K/UL — SIGNIFICANT CHANGE UP (ref 3.8–10.5)
WBC # FLD AUTO: 6.41 K/UL — SIGNIFICANT CHANGE UP (ref 3.8–10.5)

## 2024-06-13 PROCEDURE — 43255 EGD CONTROL BLEEDING ANY: CPT

## 2024-06-13 PROCEDURE — 71045 X-RAY EXAM CHEST 1 VIEW: CPT | Mod: 26

## 2024-06-13 PROCEDURE — 99222 1ST HOSP IP/OBS MODERATE 55: CPT | Mod: 25

## 2024-06-13 DEVICE — SET OTSC SYSTEM 11/6A: Type: IMPLANTABLE DEVICE | Status: FUNCTIONAL

## 2024-06-13 RX ORDER — OCTREOTIDE ACETATE 100 UG/ML
50 INJECTION, SOLUTION INTRAVENOUS; SUBCUTANEOUS ONCE
Refills: 0 | Status: COMPLETED | OUTPATIENT
Start: 2024-06-13 | End: 2024-06-13

## 2024-06-13 RX ORDER — POTASSIUM PHOSPHATE, MONOBASIC POTASSIUM PHOSPHATE, DIBASIC INJECTION, 236; 224 MG/ML; MG/ML
30 SOLUTION, CONCENTRATE INTRAVENOUS ONCE
Refills: 0 | Status: COMPLETED | OUTPATIENT
Start: 2024-06-13 | End: 2024-06-13

## 2024-06-13 RX ORDER — OCTREOTIDE ACETATE 100 UG/ML
50 INJECTION, SOLUTION INTRAVENOUS; SUBCUTANEOUS
Qty: 500 | Refills: 0 | Status: DISCONTINUED | OUTPATIENT
Start: 2024-06-13 | End: 2024-06-13

## 2024-06-13 RX ORDER — POTASSIUM CHLORIDE 600 MG/1
10 TABLET, FILM COATED, EXTENDED RELEASE ORAL
Refills: 0 | Status: COMPLETED | OUTPATIENT
Start: 2024-06-13 | End: 2024-06-13

## 2024-06-13 RX ORDER — LORAZEPAM 0.5 MG
2 TABLET ORAL
Refills: 0 | Status: DISCONTINUED | OUTPATIENT
Start: 2024-06-13 | End: 2024-06-14

## 2024-06-13 RX ORDER — PEG3350/SOD SULF,BICARB,CL/KCL 240-22.72G
4000 SOLUTION, RECONSTITUTED, ORAL ORAL ONCE
Refills: 0 | Status: DISCONTINUED | OUTPATIENT
Start: 2024-06-13 | End: 2024-06-13

## 2024-06-13 RX ORDER — ACETAMINOPHEN 325 MG
650 TABLET ORAL ONCE
Refills: 0 | Status: COMPLETED | OUTPATIENT
Start: 2024-06-13 | End: 2024-06-13

## 2024-06-13 RX ORDER — SOD PHOS DI, MONO/K PHOS MONO 250 MG
1 TABLET ORAL
Refills: 0 | Status: DISCONTINUED | OUTPATIENT
Start: 2024-06-13 | End: 2024-06-13

## 2024-06-13 RX ORDER — FOLIC ACID
1 POWDER (GRAM) MISCELLANEOUS DAILY
Refills: 0 | Status: DISCONTINUED | OUTPATIENT
Start: 2024-06-13 | End: 2024-06-16

## 2024-06-13 RX ORDER — MAGNESIUM SULFATE 100 %
2 POWDER (GRAM) MISCELLANEOUS ONCE
Refills: 0 | Status: COMPLETED | OUTPATIENT
Start: 2024-06-13 | End: 2024-06-13

## 2024-06-13 RX ORDER — DEXTROSE MONOHYDRATE AND SODIUM CHLORIDE 5; .3 G/100ML; G/100ML
1000 INJECTION, SOLUTION INTRAVENOUS
Refills: 0 | Status: DISCONTINUED | OUTPATIENT
Start: 2024-06-13 | End: 2024-06-14

## 2024-06-13 RX ORDER — ACETAMINOPHEN 325 MG
1000 TABLET ORAL ONCE
Refills: 0 | Status: DISCONTINUED | OUTPATIENT
Start: 2024-06-13 | End: 2024-06-13

## 2024-06-13 RX ORDER — LORAZEPAM 0.5 MG
2 TABLET ORAL EVERY 4 HOURS
Refills: 0 | Status: DISCONTINUED | OUTPATIENT
Start: 2024-06-13 | End: 2024-06-15

## 2024-06-13 RX ORDER — DEXTROSE MONOHYDRATE AND SODIUM CHLORIDE 5; .3 G/100ML; G/100ML
1726 INJECTION, SOLUTION INTRAVENOUS ONCE
Refills: 0 | Status: DISCONTINUED | OUTPATIENT
Start: 2024-06-13 | End: 2024-06-13

## 2024-06-13 RX ORDER — CEFTRIAXONE SODIUM 500 MG
1000 VIAL (EA) INJECTION EVERY 24 HOURS
Refills: 0 | Status: DISCONTINUED | OUTPATIENT
Start: 2024-06-13 | End: 2024-06-14

## 2024-06-13 RX ADMIN — HYDROMORPHONE HCL 1 MILLIGRAM(S): 0.2 INJECTION, SOLUTION INTRAVENOUS at 02:21

## 2024-06-13 RX ADMIN — Medication 100 MILLIGRAM(S): at 21:44

## 2024-06-13 RX ADMIN — Medication 105 MILLIGRAM(S): at 15:11

## 2024-06-13 RX ADMIN — DEXTROSE MONOHYDRATE AND SODIUM CHLORIDE 150 MILLILITER(S): 5; .3 INJECTION, SOLUTION INTRAVENOUS at 22:19

## 2024-06-13 RX ADMIN — Medication 650 MILLIGRAM(S): at 02:21

## 2024-06-13 RX ADMIN — Medication 105 MILLIGRAM(S): at 21:42

## 2024-06-13 RX ADMIN — Medication 25 GRAM(S): at 21:43

## 2024-06-13 RX ADMIN — POTASSIUM CHLORIDE 100 MILLIEQUIVALENT(S): 600 TABLET, FILM COATED, EXTENDED RELEASE ORAL at 09:50

## 2024-06-13 RX ADMIN — POTASSIUM CHLORIDE 100 MILLIEQUIVALENT(S): 600 TABLET, FILM COATED, EXTENDED RELEASE ORAL at 08:41

## 2024-06-13 RX ADMIN — Medication 650 MILLIGRAM(S): at 02:48

## 2024-06-13 RX ADMIN — PANTOPRAZOLE SODIUM 40 MILLIGRAM(S): 40 INJECTION, POWDER, FOR SOLUTION INTRAVENOUS at 18:04

## 2024-06-13 RX ADMIN — DEXTROSE MONOHYDRATE AND SODIUM CHLORIDE 150 MILLILITER(S): 5; .3 INJECTION, SOLUTION INTRAVENOUS at 02:20

## 2024-06-13 RX ADMIN — Medication 2 MILLIGRAM(S): at 17:32

## 2024-06-13 RX ADMIN — PANTOPRAZOLE SODIUM 40 MILLIGRAM(S): 40 INJECTION, POWDER, FOR SOLUTION INTRAVENOUS at 07:27

## 2024-06-13 RX ADMIN — POTASSIUM CHLORIDE 100 MILLIEQUIVALENT(S): 600 TABLET, FILM COATED, EXTENDED RELEASE ORAL at 10:45

## 2024-06-13 RX ADMIN — DEXTROSE MONOHYDRATE AND SODIUM CHLORIDE 150 MILLILITER(S): 5; .3 INJECTION, SOLUTION INTRAVENOUS at 17:45

## 2024-06-13 RX ADMIN — HYDROMORPHONE HCL 1 MILLIGRAM(S): 0.2 INJECTION, SOLUTION INTRAVENOUS at 02:48

## 2024-06-13 RX ADMIN — CHLORDIAZEPOXIDE HYDROCHLORIDE 50 MILLIGRAM(S): 10 CAPSULE ORAL at 11:11

## 2024-06-13 RX ADMIN — OCTREOTIDE ACETATE 50 MICROGRAM(S): 100 INJECTION, SOLUTION INTRAVENOUS; SUBCUTANEOUS at 15:11

## 2024-06-13 RX ADMIN — POTASSIUM PHOSPHATE, MONOBASIC POTASSIUM PHOSPHATE, DIBASIC INJECTION, 83.33 MILLIMOLE(S): 236; 224 SOLUTION, CONCENTRATE INTRAVENOUS at 22:20

## 2024-06-13 RX ADMIN — Medication 105 MILLIGRAM(S): at 06:18

## 2024-06-13 RX ADMIN — Medication 1 TABLET(S): at 11:11

## 2024-06-13 RX ADMIN — Medication 1 MILLIGRAM(S): at 11:11

## 2024-06-13 RX ADMIN — CHLORDIAZEPOXIDE HYDROCHLORIDE 50 MILLIGRAM(S): 10 CAPSULE ORAL at 00:16

## 2024-06-13 RX ADMIN — HYDROMORPHONE HCL 0.5 MILLIGRAM(S): 0.2 INJECTION, SOLUTION INTRAVENOUS at 07:00

## 2024-06-13 RX ADMIN — Medication 2 MILLIGRAM(S): at 21:43

## 2024-06-13 RX ADMIN — HYDROMORPHONE HCL 0.5 MILLIGRAM(S): 0.2 INJECTION, SOLUTION INTRAVENOUS at 06:28

## 2024-06-13 RX ADMIN — CHLORDIAZEPOXIDE HYDROCHLORIDE 50 MILLIGRAM(S): 10 CAPSULE ORAL at 06:27

## 2024-06-14 LAB
ALBUMIN SERPL ELPH-MCNC: 2.3 G/DL — LOW (ref 3.5–5)
ALP SERPL-CCNC: 192 U/L — HIGH (ref 40–120)
ALT FLD-CCNC: 62 U/L DA — HIGH (ref 10–60)
ANION GAP SERPL CALC-SCNC: 6 MMOL/L — SIGNIFICANT CHANGE UP (ref 5–17)
AST SERPL-CCNC: 114 U/L — HIGH (ref 10–40)
BASOPHILS # BLD AUTO: 0.07 K/UL — SIGNIFICANT CHANGE UP (ref 0–0.2)
BASOPHILS # BLD AUTO: 0.08 K/UL — SIGNIFICANT CHANGE UP (ref 0–0.2)
BASOPHILS NFR BLD AUTO: 1.5 % — SIGNIFICANT CHANGE UP (ref 0–2)
BASOPHILS NFR BLD AUTO: 1.5 % — SIGNIFICANT CHANGE UP (ref 0–2)
BILIRUB SERPL-MCNC: 2 MG/DL — HIGH (ref 0.2–1.2)
BLD GP AB SCN SERPL QL: SIGNIFICANT CHANGE UP
BUN SERPL-MCNC: 3 MG/DL — LOW (ref 7–18)
CALCIUM SERPL-MCNC: 6.8 MG/DL — LOW (ref 8.4–10.5)
CHLORIDE SERPL-SCNC: 105 MMOL/L — SIGNIFICANT CHANGE UP (ref 96–108)
CO2 SERPL-SCNC: 28 MMOL/L — SIGNIFICANT CHANGE UP (ref 22–31)
CREAT SERPL-MCNC: 0.52 MG/DL — SIGNIFICANT CHANGE UP (ref 0.5–1.3)
EGFR: 130 ML/MIN/1.73M2 — SIGNIFICANT CHANGE UP
EOSINOPHIL # BLD AUTO: 0.2 K/UL — SIGNIFICANT CHANGE UP (ref 0–0.5)
EOSINOPHIL # BLD AUTO: 0.26 K/UL — SIGNIFICANT CHANGE UP (ref 0–0.5)
EOSINOPHIL NFR BLD AUTO: 3.9 % — SIGNIFICANT CHANGE UP (ref 0–6)
EOSINOPHIL NFR BLD AUTO: 5.5 % — SIGNIFICANT CHANGE UP (ref 0–6)
GLUCOSE BLDC GLUCOMTR-MCNC: 95 MG/DL — SIGNIFICANT CHANGE UP (ref 70–99)
GLUCOSE SERPL-MCNC: 98 MG/DL — SIGNIFICANT CHANGE UP (ref 70–99)
HCT VFR BLD CALC: 27.2 % — LOW (ref 39–50)
HCT VFR BLD CALC: 28.8 % — LOW (ref 39–50)
HGB BLD-MCNC: 10 G/DL — LOW (ref 13–17)
HGB BLD-MCNC: 9.3 G/DL — LOW (ref 13–17)
IMM GRANULOCYTES NFR BLD AUTO: 0.4 % — SIGNIFICANT CHANGE UP (ref 0–0.9)
IMM GRANULOCYTES NFR BLD AUTO: 0.4 % — SIGNIFICANT CHANGE UP (ref 0–0.9)
LYMPHOCYTES # BLD AUTO: 0.8 K/UL — LOW (ref 1–3.3)
LYMPHOCYTES # BLD AUTO: 0.9 K/UL — LOW (ref 1–3.3)
LYMPHOCYTES # BLD AUTO: 16.8 % — SIGNIFICANT CHANGE UP (ref 13–44)
LYMPHOCYTES # BLD AUTO: 17.3 % — SIGNIFICANT CHANGE UP (ref 13–44)
MAGNESIUM SERPL-MCNC: 2.1 MG/DL — SIGNIFICANT CHANGE UP (ref 1.6–2.6)
MCHC RBC-ENTMCNC: 31.5 PG — SIGNIFICANT CHANGE UP (ref 27–34)
MCHC RBC-ENTMCNC: 31.6 PG — SIGNIFICANT CHANGE UP (ref 27–34)
MCHC RBC-ENTMCNC: 34.2 GM/DL — SIGNIFICANT CHANGE UP (ref 32–36)
MCHC RBC-ENTMCNC: 34.7 GM/DL — SIGNIFICANT CHANGE UP (ref 32–36)
MCV RBC AUTO: 91.1 FL — SIGNIFICANT CHANGE UP (ref 80–100)
MCV RBC AUTO: 92.2 FL — SIGNIFICANT CHANGE UP (ref 80–100)
MONOCYTES # BLD AUTO: 0.35 K/UL — SIGNIFICANT CHANGE UP (ref 0–0.9)
MONOCYTES # BLD AUTO: 0.39 K/UL — SIGNIFICANT CHANGE UP (ref 0–0.9)
MONOCYTES NFR BLD AUTO: 6.7 % — SIGNIFICANT CHANGE UP (ref 2–14)
MONOCYTES NFR BLD AUTO: 8.2 % — SIGNIFICANT CHANGE UP (ref 2–14)
MRSA PCR RESULT.: SIGNIFICANT CHANGE UP
MRSA PCR RESULT.: SIGNIFICANT CHANGE UP
NEUTROPHILS # BLD AUTO: 3.22 K/UL — SIGNIFICANT CHANGE UP (ref 1.8–7.4)
NEUTROPHILS # BLD AUTO: 3.64 K/UL — SIGNIFICANT CHANGE UP (ref 1.8–7.4)
NEUTROPHILS NFR BLD AUTO: 67.6 % — SIGNIFICANT CHANGE UP (ref 43–77)
NEUTROPHILS NFR BLD AUTO: 70.2 % — SIGNIFICANT CHANGE UP (ref 43–77)
NRBC # BLD: 0 /100 WBCS — SIGNIFICANT CHANGE UP (ref 0–0)
NRBC # BLD: 0 /100 WBCS — SIGNIFICANT CHANGE UP (ref 0–0)
PHOSPHATE SERPL-MCNC: 3.4 MG/DL — SIGNIFICANT CHANGE UP (ref 2.5–4.5)
PLATELET # BLD AUTO: 119 K/UL — LOW (ref 150–400)
PLATELET # BLD AUTO: 120 K/UL — LOW (ref 150–400)
POTASSIUM SERPL-MCNC: 3.2 MMOL/L — LOW (ref 3.5–5.3)
POTASSIUM SERPL-SCNC: 3.2 MMOL/L — LOW (ref 3.5–5.3)
PROT SERPL-MCNC: 5.6 G/DL — LOW (ref 6–8.3)
RBC # BLD: 2.95 M/UL — LOW (ref 4.2–5.8)
RBC # BLD: 3.16 M/UL — LOW (ref 4.2–5.8)
RBC # FLD: 16.7 % — HIGH (ref 10.3–14.5)
RBC # FLD: 17.2 % — HIGH (ref 10.3–14.5)
S AUREUS DNA NOSE QL NAA+PROBE: SIGNIFICANT CHANGE UP
S AUREUS DNA NOSE QL NAA+PROBE: SIGNIFICANT CHANGE UP
SODIUM SERPL-SCNC: 139 MMOL/L — SIGNIFICANT CHANGE UP (ref 135–145)
WBC # BLD: 4.76 K/UL — SIGNIFICANT CHANGE UP (ref 3.8–10.5)
WBC # BLD: 4.98 K/UL — SIGNIFICANT CHANGE UP (ref 3.8–10.5)
WBC # FLD AUTO: 4.76 K/UL — SIGNIFICANT CHANGE UP (ref 3.8–10.5)
WBC # FLD AUTO: 4.98 K/UL — SIGNIFICANT CHANGE UP (ref 3.8–10.5)

## 2024-06-14 PROCEDURE — 99233 SBSQ HOSP IP/OBS HIGH 50: CPT

## 2024-06-14 RX ORDER — SUCRALFATE 1 G
1 TABLET ORAL EVERY 6 HOURS
Refills: 0 | Status: DISCONTINUED | OUTPATIENT
Start: 2024-06-14 | End: 2024-06-18

## 2024-06-14 RX ORDER — LORAZEPAM 0.5 MG
2 TABLET ORAL
Refills: 0 | Status: DISCONTINUED | OUTPATIENT
Start: 2024-06-14 | End: 2024-06-15

## 2024-06-14 RX ORDER — LORAZEPAM 0.5 MG
2 TABLET ORAL
Refills: 0 | Status: DISCONTINUED | OUTPATIENT
Start: 2024-06-14 | End: 2024-06-14

## 2024-06-14 RX ORDER — POTASSIUM CHLORIDE 600 MG/1
10 TABLET, FILM COATED, EXTENDED RELEASE ORAL
Refills: 0 | Status: COMPLETED | OUTPATIENT
Start: 2024-06-14 | End: 2024-06-14

## 2024-06-14 RX ORDER — SUCRALFATE 1 G
1 TABLET ORAL
Refills: 0 | Status: DISCONTINUED | OUTPATIENT
Start: 2024-06-14 | End: 2024-06-14

## 2024-06-14 RX ADMIN — Medication 105 MILLIGRAM(S): at 21:24

## 2024-06-14 RX ADMIN — Medication 1 GRAM(S): at 23:41

## 2024-06-14 RX ADMIN — Medication 650 MILLIGRAM(S): at 01:20

## 2024-06-14 RX ADMIN — Medication 2 MILLIGRAM(S): at 18:50

## 2024-06-14 RX ADMIN — Medication 2 MILLIGRAM(S): at 21:25

## 2024-06-14 RX ADMIN — PANTOPRAZOLE SODIUM 40 MILLIGRAM(S): 40 INJECTION, POWDER, FOR SOLUTION INTRAVENOUS at 05:06

## 2024-06-14 RX ADMIN — POTASSIUM CHLORIDE 100 MILLIEQUIVALENT(S): 600 TABLET, FILM COATED, EXTENDED RELEASE ORAL at 07:55

## 2024-06-14 RX ADMIN — Medication 1 GRAM(S): at 18:50

## 2024-06-14 RX ADMIN — Medication 2 MILLIGRAM(S): at 16:46

## 2024-06-14 RX ADMIN — Medication 2 MILLIGRAM(S): at 02:53

## 2024-06-14 RX ADMIN — Medication 105 MILLIGRAM(S): at 13:36

## 2024-06-14 RX ADMIN — Medication 1 APPLICATION(S): at 11:18

## 2024-06-14 RX ADMIN — HYDROMORPHONE HCL 1 MILLIGRAM(S): 0.2 INJECTION, SOLUTION INTRAVENOUS at 08:24

## 2024-06-14 RX ADMIN — Medication 2 MILLIGRAM(S): at 13:36

## 2024-06-14 RX ADMIN — Medication 2 MILLIGRAM(S): at 07:56

## 2024-06-14 RX ADMIN — Medication 2 MILLIGRAM(S): at 05:06

## 2024-06-14 RX ADMIN — Medication 1 TABLET(S): at 11:18

## 2024-06-14 RX ADMIN — Medication 2 MILLIGRAM(S): at 00:45

## 2024-06-14 RX ADMIN — Medication 2 MILLIGRAM(S): at 11:18

## 2024-06-14 RX ADMIN — POTASSIUM CHLORIDE 100 MILLIEQUIVALENT(S): 600 TABLET, FILM COATED, EXTENDED RELEASE ORAL at 06:37

## 2024-06-14 RX ADMIN — Medication 650 MILLIGRAM(S): at 02:20

## 2024-06-14 RX ADMIN — Medication 1 GRAM(S): at 11:18

## 2024-06-14 RX ADMIN — Medication 2 MILLIGRAM(S): at 06:20

## 2024-06-14 RX ADMIN — POTASSIUM CHLORIDE 100 MILLIEQUIVALENT(S): 600 TABLET, FILM COATED, EXTENDED RELEASE ORAL at 05:06

## 2024-06-14 RX ADMIN — HYDROMORPHONE HCL 1 MILLIGRAM(S): 0.2 INJECTION, SOLUTION INTRAVENOUS at 13:51

## 2024-06-14 RX ADMIN — Medication 105 MILLIGRAM(S): at 05:06

## 2024-06-14 RX ADMIN — PANTOPRAZOLE SODIUM 40 MILLIGRAM(S): 40 INJECTION, POWDER, FOR SOLUTION INTRAVENOUS at 18:50

## 2024-06-14 RX ADMIN — HYDROMORPHONE HCL 1 MILLIGRAM(S): 0.2 INJECTION, SOLUTION INTRAVENOUS at 13:36

## 2024-06-14 RX ADMIN — Medication 1 MILLIGRAM(S): at 11:18

## 2024-06-14 RX ADMIN — HYDROMORPHONE HCL 1 MILLIGRAM(S): 0.2 INJECTION, SOLUTION INTRAVENOUS at 08:39

## 2024-06-15 LAB
ALBUMIN SERPL ELPH-MCNC: 2.2 G/DL — LOW (ref 3.5–5)
ALP SERPL-CCNC: 202 U/L — HIGH (ref 40–120)
ALT FLD-CCNC: 59 U/L DA — SIGNIFICANT CHANGE UP (ref 10–60)
ANION GAP SERPL CALC-SCNC: 7 MMOL/L — SIGNIFICANT CHANGE UP (ref 5–17)
AST SERPL-CCNC: 101 U/L — HIGH (ref 10–40)
BASOPHILS # BLD AUTO: 0.04 K/UL — SIGNIFICANT CHANGE UP (ref 0–0.2)
BASOPHILS # BLD AUTO: 0.05 K/UL — SIGNIFICANT CHANGE UP (ref 0–0.2)
BASOPHILS NFR BLD AUTO: 1.1 % — SIGNIFICANT CHANGE UP (ref 0–2)
BASOPHILS NFR BLD AUTO: 1.2 % — SIGNIFICANT CHANGE UP (ref 0–2)
BILIRUB SERPL-MCNC: 1.7 MG/DL — HIGH (ref 0.2–1.2)
BUN SERPL-MCNC: 4 MG/DL — LOW (ref 7–18)
CALCIUM SERPL-MCNC: 7.2 MG/DL — LOW (ref 8.4–10.5)
CHLORIDE SERPL-SCNC: 103 MMOL/L — SIGNIFICANT CHANGE UP (ref 96–108)
CO2 SERPL-SCNC: 26 MMOL/L — SIGNIFICANT CHANGE UP (ref 22–31)
CREAT SERPL-MCNC: 0.62 MG/DL — SIGNIFICANT CHANGE UP (ref 0.5–1.3)
EGFR: 123 ML/MIN/1.73M2 — SIGNIFICANT CHANGE UP
EOSINOPHIL # BLD AUTO: 0.18 K/UL — SIGNIFICANT CHANGE UP (ref 0–0.5)
EOSINOPHIL # BLD AUTO: 0.25 K/UL — SIGNIFICANT CHANGE UP (ref 0–0.5)
EOSINOPHIL NFR BLD AUTO: 4.8 % — SIGNIFICANT CHANGE UP (ref 0–6)
EOSINOPHIL NFR BLD AUTO: 5.9 % — SIGNIFICANT CHANGE UP (ref 0–6)
GLUCOSE SERPL-MCNC: 112 MG/DL — HIGH (ref 70–99)
HCT VFR BLD CALC: 28.5 % — LOW (ref 39–50)
HCT VFR BLD CALC: 30.6 % — LOW (ref 39–50)
HGB BLD-MCNC: 10.1 G/DL — LOW (ref 13–17)
HGB BLD-MCNC: 10.5 G/DL — LOW (ref 13–17)
IMM GRANULOCYTES NFR BLD AUTO: 0.8 % — SIGNIFICANT CHANGE UP (ref 0–0.9)
IMM GRANULOCYTES NFR BLD AUTO: 0.9 % — SIGNIFICANT CHANGE UP (ref 0–0.9)
LYMPHOCYTES # BLD AUTO: 0.84 K/UL — LOW (ref 1–3.3)
LYMPHOCYTES # BLD AUTO: 0.88 K/UL — LOW (ref 1–3.3)
LYMPHOCYTES # BLD AUTO: 20.8 % — SIGNIFICANT CHANGE UP (ref 13–44)
LYMPHOCYTES # BLD AUTO: 22.5 % — SIGNIFICANT CHANGE UP (ref 13–44)
MAGNESIUM SERPL-MCNC: 1.9 MG/DL — SIGNIFICANT CHANGE UP (ref 1.6–2.6)
MCHC RBC-ENTMCNC: 32.2 PG — SIGNIFICANT CHANGE UP (ref 27–34)
MCHC RBC-ENTMCNC: 32.3 PG — SIGNIFICANT CHANGE UP (ref 27–34)
MCHC RBC-ENTMCNC: 34.3 GM/DL — SIGNIFICANT CHANGE UP (ref 32–36)
MCHC RBC-ENTMCNC: 35.4 GM/DL — SIGNIFICANT CHANGE UP (ref 32–36)
MCV RBC AUTO: 91.1 FL — SIGNIFICANT CHANGE UP (ref 80–100)
MCV RBC AUTO: 93.9 FL — SIGNIFICANT CHANGE UP (ref 80–100)
MONOCYTES # BLD AUTO: 0.44 K/UL — SIGNIFICANT CHANGE UP (ref 0–0.9)
MONOCYTES # BLD AUTO: 0.46 K/UL — SIGNIFICANT CHANGE UP (ref 0–0.9)
MONOCYTES NFR BLD AUTO: 10.4 % — SIGNIFICANT CHANGE UP (ref 2–14)
MONOCYTES NFR BLD AUTO: 12.3 % — SIGNIFICANT CHANGE UP (ref 2–14)
NEUTROPHILS # BLD AUTO: 2.19 K/UL — SIGNIFICANT CHANGE UP (ref 1.8–7.4)
NEUTROPHILS # BLD AUTO: 2.58 K/UL — SIGNIFICANT CHANGE UP (ref 1.8–7.4)
NEUTROPHILS NFR BLD AUTO: 58.5 % — SIGNIFICANT CHANGE UP (ref 43–77)
NEUTROPHILS NFR BLD AUTO: 60.8 % — SIGNIFICANT CHANGE UP (ref 43–77)
NRBC # BLD: 0 /100 WBCS — SIGNIFICANT CHANGE UP (ref 0–0)
NRBC # BLD: 0 /100 WBCS — SIGNIFICANT CHANGE UP (ref 0–0)
PHOSPHATE SERPL-MCNC: 2.5 MG/DL — SIGNIFICANT CHANGE UP (ref 2.5–4.5)
PLATELET # BLD AUTO: 128 K/UL — LOW (ref 150–400)
PLATELET # BLD AUTO: 142 K/UL — LOW (ref 150–400)
POTASSIUM SERPL-MCNC: 3.1 MMOL/L — LOW (ref 3.5–5.3)
POTASSIUM SERPL-SCNC: 3.1 MMOL/L — LOW (ref 3.5–5.3)
PROT SERPL-MCNC: 5.7 G/DL — LOW (ref 6–8.3)
RBC # BLD: 3.13 M/UL — LOW (ref 4.2–5.8)
RBC # BLD: 3.26 M/UL — LOW (ref 4.2–5.8)
RBC # FLD: 16.9 % — HIGH (ref 10.3–14.5)
RBC # FLD: 17.3 % — HIGH (ref 10.3–14.5)
SODIUM SERPL-SCNC: 136 MMOL/L — SIGNIFICANT CHANGE UP (ref 135–145)
WBC # BLD: 3.74 K/UL — LOW (ref 3.8–10.5)
WBC # BLD: 4.24 K/UL — SIGNIFICANT CHANGE UP (ref 3.8–10.5)
WBC # FLD AUTO: 3.74 K/UL — LOW (ref 3.8–10.5)
WBC # FLD AUTO: 4.24 K/UL — SIGNIFICANT CHANGE UP (ref 3.8–10.5)

## 2024-06-15 RX ORDER — SODIUM CHLORIDE 0.9 % (FLUSH) 0.9 %
1000 SYRINGE (ML) INJECTION
Refills: 0 | Status: DISCONTINUED | OUTPATIENT
Start: 2024-06-15 | End: 2024-06-18

## 2024-06-15 RX ORDER — SOD PHOS DI, MONO/K PHOS MONO 250 MG
1 TABLET ORAL
Refills: 0 | Status: COMPLETED | OUTPATIENT
Start: 2024-06-15 | End: 2024-06-15

## 2024-06-15 RX ORDER — POTASSIUM CHLORIDE 600 MG/1
10 TABLET, FILM COATED, EXTENDED RELEASE ORAL
Refills: 0 | Status: DISCONTINUED | OUTPATIENT
Start: 2024-06-15 | End: 2024-06-15

## 2024-06-15 RX ORDER — POTASSIUM CHLORIDE 600 MG/1
40 TABLET, FILM COATED, EXTENDED RELEASE ORAL ONCE
Refills: 0 | Status: COMPLETED | OUTPATIENT
Start: 2024-06-15 | End: 2024-06-15

## 2024-06-15 RX ORDER — MAGNESIUM SULFATE 100 %
2 POWDER (GRAM) MISCELLANEOUS ONCE
Refills: 0 | Status: COMPLETED | OUTPATIENT
Start: 2024-06-15 | End: 2024-06-15

## 2024-06-15 RX ORDER — HYDROMORPHONE HCL 0.2 MG/ML
0.2 INJECTION, SOLUTION INTRAVENOUS EVERY 4 HOURS
Refills: 0 | Status: DISCONTINUED | OUTPATIENT
Start: 2024-06-15 | End: 2024-06-18

## 2024-06-15 RX ORDER — HYDROMORPHONE HCL 0.2 MG/ML
0.5 INJECTION, SOLUTION INTRAVENOUS EVERY 6 HOURS
Refills: 0 | Status: DISCONTINUED | OUTPATIENT
Start: 2024-06-15 | End: 2024-06-18

## 2024-06-15 RX ORDER — ACETAMINOPHEN 325 MG
650 TABLET ORAL ONCE
Refills: 0 | Status: COMPLETED | OUTPATIENT
Start: 2024-06-15 | End: 2024-06-15

## 2024-06-15 RX ADMIN — Medication 105 MILLIGRAM(S): at 13:52

## 2024-06-15 RX ADMIN — Medication 1 GRAM(S): at 11:41

## 2024-06-15 RX ADMIN — Medication 1 MILLIGRAM(S): at 14:27

## 2024-06-15 RX ADMIN — HYDROMORPHONE HCL 1 MILLIGRAM(S): 0.2 INJECTION, SOLUTION INTRAVENOUS at 01:53

## 2024-06-15 RX ADMIN — Medication 3 MILLIGRAM(S): at 01:52

## 2024-06-15 RX ADMIN — Medication 1 PACKET(S): at 11:25

## 2024-06-15 RX ADMIN — HYDROMORPHONE HCL 1 MILLIGRAM(S): 0.2 INJECTION, SOLUTION INTRAVENOUS at 02:23

## 2024-06-15 RX ADMIN — Medication 1 PACKET(S): at 13:44

## 2024-06-15 RX ADMIN — Medication 75 MILLILITER(S): at 18:24

## 2024-06-15 RX ADMIN — Medication 1 TABLET(S): at 11:41

## 2024-06-15 RX ADMIN — HYDROMORPHONE HCL 0.5 MILLIGRAM(S): 0.2 INJECTION, SOLUTION INTRAVENOUS at 12:36

## 2024-06-15 RX ADMIN — Medication 25 GRAM(S): at 10:44

## 2024-06-15 RX ADMIN — Medication 2 MILLIGRAM(S): at 05:32

## 2024-06-15 RX ADMIN — Medication 650 MILLIGRAM(S): at 23:34

## 2024-06-15 RX ADMIN — Medication 105 MILLIGRAM(S): at 05:33

## 2024-06-15 RX ADMIN — Medication 1 GRAM(S): at 23:08

## 2024-06-15 RX ADMIN — PANTOPRAZOLE SODIUM 40 MILLIGRAM(S): 40 INJECTION, POWDER, FOR SOLUTION INTRAVENOUS at 17:37

## 2024-06-15 RX ADMIN — Medication 2 MILLIGRAM(S): at 01:53

## 2024-06-15 RX ADMIN — POTASSIUM CHLORIDE 40 MILLIEQUIVALENT(S): 600 TABLET, FILM COATED, EXTENDED RELEASE ORAL at 10:54

## 2024-06-15 RX ADMIN — Medication 1 GRAM(S): at 17:37

## 2024-06-15 RX ADMIN — PANTOPRAZOLE SODIUM 40 MILLIGRAM(S): 40 INJECTION, POWDER, FOR SOLUTION INTRAVENOUS at 05:33

## 2024-06-15 RX ADMIN — Medication 1 GRAM(S): at 05:33

## 2024-06-15 RX ADMIN — Medication 1 APPLICATION(S): at 11:43

## 2024-06-15 RX ADMIN — HYDROMORPHONE HCL 0.5 MILLIGRAM(S): 0.2 INJECTION, SOLUTION INTRAVENOUS at 11:15

## 2024-06-16 LAB
ALBUMIN SERPL ELPH-MCNC: 2.3 G/DL — LOW (ref 3.5–5)
ALP SERPL-CCNC: 197 U/L — HIGH (ref 40–120)
ALT FLD-CCNC: 54 U/L DA — SIGNIFICANT CHANGE UP (ref 10–60)
ANION GAP SERPL CALC-SCNC: 6 MMOL/L — SIGNIFICANT CHANGE UP (ref 5–17)
AST SERPL-CCNC: 77 U/L — HIGH (ref 10–40)
BASOPHILS # BLD AUTO: 0.06 K/UL — SIGNIFICANT CHANGE UP (ref 0–0.2)
BASOPHILS NFR BLD AUTO: 2.2 % — HIGH (ref 0–2)
BILIRUB SERPL-MCNC: 1.4 MG/DL — HIGH (ref 0.2–1.2)
BUN SERPL-MCNC: 2 MG/DL — LOW (ref 7–18)
CALCIUM SERPL-MCNC: 7.8 MG/DL — LOW (ref 8.4–10.5)
CHLORIDE SERPL-SCNC: 110 MMOL/L — HIGH (ref 96–108)
CO2 SERPL-SCNC: 23 MMOL/L — SIGNIFICANT CHANGE UP (ref 22–31)
CREAT SERPL-MCNC: 0.55 MG/DL — SIGNIFICANT CHANGE UP (ref 0.5–1.3)
EGFR: 128 ML/MIN/1.73M2 — SIGNIFICANT CHANGE UP
EOSINOPHIL # BLD AUTO: 0.11 K/UL — SIGNIFICANT CHANGE UP (ref 0–0.5)
EOSINOPHIL NFR BLD AUTO: 4 % — SIGNIFICANT CHANGE UP (ref 0–6)
GLUCOSE SERPL-MCNC: 103 MG/DL — HIGH (ref 70–99)
HCT VFR BLD CALC: 29 % — LOW (ref 39–50)
HCT VFR BLD CALC: 29.2 % — LOW (ref 39–50)
HGB BLD-MCNC: 10.1 G/DL — LOW (ref 13–17)
HGB BLD-MCNC: 9.8 G/DL — LOW (ref 13–17)
IMM GRANULOCYTES NFR BLD AUTO: 1.1 % — HIGH (ref 0–0.9)
LYMPHOCYTES # BLD AUTO: 0.61 K/UL — LOW (ref 1–3.3)
LYMPHOCYTES # BLD AUTO: 22.1 % — SIGNIFICANT CHANGE UP (ref 13–44)
MAGNESIUM SERPL-MCNC: 2.3 MG/DL — SIGNIFICANT CHANGE UP (ref 1.6–2.6)
MCHC RBC-ENTMCNC: 31.7 PG — SIGNIFICANT CHANGE UP (ref 27–34)
MCHC RBC-ENTMCNC: 32.6 PG — SIGNIFICANT CHANGE UP (ref 27–34)
MCHC RBC-ENTMCNC: 33.6 GM/DL — SIGNIFICANT CHANGE UP (ref 32–36)
MCHC RBC-ENTMCNC: 34.8 GM/DL — SIGNIFICANT CHANGE UP (ref 32–36)
MCV RBC AUTO: 93.5 FL — SIGNIFICANT CHANGE UP (ref 80–100)
MCV RBC AUTO: 94.5 FL — SIGNIFICANT CHANGE UP (ref 80–100)
MONOCYTES # BLD AUTO: 0.38 K/UL — SIGNIFICANT CHANGE UP (ref 0–0.9)
MONOCYTES NFR BLD AUTO: 13.8 % — SIGNIFICANT CHANGE UP (ref 2–14)
NEUTROPHILS # BLD AUTO: 1.57 K/UL — LOW (ref 1.8–7.4)
NEUTROPHILS NFR BLD AUTO: 56.8 % — SIGNIFICANT CHANGE UP (ref 43–77)
NRBC # BLD: 0 /100 WBCS — SIGNIFICANT CHANGE UP (ref 0–0)
NRBC # BLD: 0 /100 WBCS — SIGNIFICANT CHANGE UP (ref 0–0)
PHOSPHATE SERPL-MCNC: 2.5 MG/DL — SIGNIFICANT CHANGE UP (ref 2.5–4.5)
PLATELET # BLD AUTO: 150 K/UL — SIGNIFICANT CHANGE UP (ref 150–400)
PLATELET # BLD AUTO: 156 K/UL — SIGNIFICANT CHANGE UP (ref 150–400)
POTASSIUM SERPL-MCNC: 3.3 MMOL/L — LOW (ref 3.5–5.3)
POTASSIUM SERPL-SCNC: 3.3 MMOL/L — LOW (ref 3.5–5.3)
PROT SERPL-MCNC: 5.7 G/DL — LOW (ref 6–8.3)
RBC # BLD: 3.09 M/UL — LOW (ref 4.2–5.8)
RBC # BLD: 3.1 M/UL — LOW (ref 4.2–5.8)
RBC # FLD: 17.6 % — HIGH (ref 10.3–14.5)
RBC # FLD: 17.9 % — HIGH (ref 10.3–14.5)
SODIUM SERPL-SCNC: 139 MMOL/L — SIGNIFICANT CHANGE UP (ref 135–145)
WBC # BLD: 2.76 K/UL — LOW (ref 3.8–10.5)
WBC # BLD: 3.2 K/UL — LOW (ref 3.8–10.5)
WBC # FLD AUTO: 2.76 K/UL — LOW (ref 3.8–10.5)
WBC # FLD AUTO: 3.2 K/UL — LOW (ref 3.8–10.5)

## 2024-06-16 PROCEDURE — 99233 SBSQ HOSP IP/OBS HIGH 50: CPT

## 2024-06-16 RX ORDER — PANTOPRAZOLE SODIUM 40 MG/10ML
40 INJECTION, POWDER, FOR SOLUTION INTRAVENOUS
Refills: 0 | Status: DISCONTINUED | OUTPATIENT
Start: 2024-06-16 | End: 2024-06-18

## 2024-06-16 RX ORDER — FOLIC ACID
1 POWDER (GRAM) MISCELLANEOUS DAILY
Refills: 0 | Status: DISCONTINUED | OUTPATIENT
Start: 2024-06-16 | End: 2024-06-18

## 2024-06-16 RX ORDER — POTASSIUM CHLORIDE 600 MG/1
10 TABLET, FILM COATED, EXTENDED RELEASE ORAL
Refills: 0 | Status: COMPLETED | OUTPATIENT
Start: 2024-06-16 | End: 2024-06-16

## 2024-06-16 RX ORDER — ACETAMINOPHEN 325 MG
650 TABLET ORAL EVERY 6 HOURS
Refills: 0 | Status: DISCONTINUED | OUTPATIENT
Start: 2024-06-16 | End: 2024-06-18

## 2024-06-16 RX ADMIN — POTASSIUM CHLORIDE 100 MILLIEQUIVALENT(S): 600 TABLET, FILM COATED, EXTENDED RELEASE ORAL at 09:48

## 2024-06-16 RX ADMIN — Medication 650 MILLIGRAM(S): at 01:13

## 2024-06-16 RX ADMIN — PANTOPRAZOLE SODIUM 40 MILLIGRAM(S): 40 INJECTION, POWDER, FOR SOLUTION INTRAVENOUS at 17:57

## 2024-06-16 RX ADMIN — Medication 1 TABLET(S): at 12:47

## 2024-06-16 RX ADMIN — HYDROMORPHONE HCL 0.5 MILLIGRAM(S): 0.2 INJECTION, SOLUTION INTRAVENOUS at 08:47

## 2024-06-16 RX ADMIN — Medication 1 GRAM(S): at 17:58

## 2024-06-16 RX ADMIN — HYDROMORPHONE HCL 0.5 MILLIGRAM(S): 0.2 INJECTION, SOLUTION INTRAVENOUS at 07:51

## 2024-06-16 RX ADMIN — Medication 1 MILLIGRAM(S): at 12:47

## 2024-06-16 RX ADMIN — POTASSIUM CHLORIDE 100 MILLIEQUIVALENT(S): 600 TABLET, FILM COATED, EXTENDED RELEASE ORAL at 14:44

## 2024-06-16 RX ADMIN — Medication 1 GRAM(S): at 12:47

## 2024-06-16 RX ADMIN — Medication 1 GRAM(S): at 05:10

## 2024-06-16 RX ADMIN — PANTOPRAZOLE SODIUM 40 MILLIGRAM(S): 40 INJECTION, POWDER, FOR SOLUTION INTRAVENOUS at 05:10

## 2024-06-16 RX ADMIN — POTASSIUM CHLORIDE 100 MILLIEQUIVALENT(S): 600 TABLET, FILM COATED, EXTENDED RELEASE ORAL at 08:27

## 2024-06-17 LAB
ALBUMIN SERPL ELPH-MCNC: 2.6 G/DL — LOW (ref 3.5–5)
ALP SERPL-CCNC: 205 U/L — HIGH (ref 40–120)
ALT FLD-CCNC: 58 U/L DA — SIGNIFICANT CHANGE UP (ref 10–60)
ANION GAP SERPL CALC-SCNC: 4 MMOL/L — LOW (ref 5–17)
AST SERPL-CCNC: 77 U/L — HIGH (ref 10–40)
BILIRUB SERPL-MCNC: 1.1 MG/DL — SIGNIFICANT CHANGE UP (ref 0.2–1.2)
BUN SERPL-MCNC: 3 MG/DL — LOW (ref 7–18)
CALCIUM SERPL-MCNC: 8.6 MG/DL — SIGNIFICANT CHANGE UP (ref 8.4–10.5)
CHLORIDE SERPL-SCNC: 112 MMOL/L — HIGH (ref 96–108)
CO2 SERPL-SCNC: 24 MMOL/L — SIGNIFICANT CHANGE UP (ref 22–31)
CREAT SERPL-MCNC: 0.65 MG/DL — SIGNIFICANT CHANGE UP (ref 0.5–1.3)
EGFR: 121 ML/MIN/1.73M2 — SIGNIFICANT CHANGE UP
GLUCOSE SERPL-MCNC: 119 MG/DL — HIGH (ref 70–99)
HCT VFR BLD CALC: 31.9 % — LOW (ref 39–50)
HGB BLD-MCNC: 10.6 G/DL — LOW (ref 13–17)
MCHC RBC-ENTMCNC: 31.6 PG — SIGNIFICANT CHANGE UP (ref 27–34)
MCHC RBC-ENTMCNC: 33.2 GM/DL — SIGNIFICANT CHANGE UP (ref 32–36)
MCV RBC AUTO: 95.2 FL — SIGNIFICANT CHANGE UP (ref 80–100)
NRBC # BLD: 0 /100 WBCS — SIGNIFICANT CHANGE UP (ref 0–0)
PLATELET # BLD AUTO: 196 K/UL — SIGNIFICANT CHANGE UP (ref 150–400)
POTASSIUM SERPL-MCNC: 3.5 MMOL/L — SIGNIFICANT CHANGE UP (ref 3.5–5.3)
POTASSIUM SERPL-SCNC: 3.5 MMOL/L — SIGNIFICANT CHANGE UP (ref 3.5–5.3)
PROT SERPL-MCNC: 6.4 G/DL — SIGNIFICANT CHANGE UP (ref 6–8.3)
RBC # BLD: 3.35 M/UL — LOW (ref 4.2–5.8)
RBC # FLD: 18.8 % — HIGH (ref 10.3–14.5)
SODIUM SERPL-SCNC: 140 MMOL/L — SIGNIFICANT CHANGE UP (ref 135–145)
WBC # BLD: 3.41 K/UL — LOW (ref 3.8–10.5)
WBC # FLD AUTO: 3.41 K/UL — LOW (ref 3.8–10.5)

## 2024-06-17 PROCEDURE — 99233 SBSQ HOSP IP/OBS HIGH 50: CPT

## 2024-06-17 RX ORDER — POTASSIUM CHLORIDE 600 MG/1
20 TABLET, FILM COATED, EXTENDED RELEASE ORAL EVERY 4 HOURS
Refills: 0 | Status: DISCONTINUED | OUTPATIENT
Start: 2024-06-17 | End: 2024-06-17

## 2024-06-17 RX ADMIN — Medication 1 GRAM(S): at 18:32

## 2024-06-17 RX ADMIN — Medication 1 GRAM(S): at 14:14

## 2024-06-17 RX ADMIN — Medication 1 GRAM(S): at 05:48

## 2024-06-17 RX ADMIN — Medication 1 APPLICATION(S): at 14:14

## 2024-06-17 RX ADMIN — PANTOPRAZOLE SODIUM 40 MILLIGRAM(S): 40 INJECTION, POWDER, FOR SOLUTION INTRAVENOUS at 05:47

## 2024-06-17 RX ADMIN — Medication 1 TABLET(S): at 14:14

## 2024-06-17 RX ADMIN — PANTOPRAZOLE SODIUM 40 MILLIGRAM(S): 40 INJECTION, POWDER, FOR SOLUTION INTRAVENOUS at 18:32

## 2024-06-17 RX ADMIN — Medication 1 GRAM(S): at 00:36

## 2024-06-17 RX ADMIN — Medication 1 MILLIGRAM(S): at 14:14

## 2024-06-18 VITALS — HEART RATE: 79 BPM | DIASTOLIC BLOOD PRESSURE: 76 MMHG | SYSTOLIC BLOOD PRESSURE: 112 MMHG

## 2024-06-18 LAB
ALBUMIN SERPL ELPH-MCNC: 2.8 G/DL — LOW (ref 3.5–5)
ALP SERPL-CCNC: 213 U/L — HIGH (ref 40–120)
ALT FLD-CCNC: 67 U/L DA — HIGH (ref 10–60)
ANION GAP SERPL CALC-SCNC: 8 MMOL/L — SIGNIFICANT CHANGE UP (ref 5–17)
AST SERPL-CCNC: 100 U/L — HIGH (ref 10–40)
BILIRUB SERPL-MCNC: 0.8 MG/DL — SIGNIFICANT CHANGE UP (ref 0.2–1.2)
BUN SERPL-MCNC: 6 MG/DL — LOW (ref 7–18)
CALCIUM SERPL-MCNC: 8.5 MG/DL — SIGNIFICANT CHANGE UP (ref 8.4–10.5)
CHLORIDE SERPL-SCNC: 109 MMOL/L — HIGH (ref 96–108)
CO2 SERPL-SCNC: 21 MMOL/L — LOW (ref 22–31)
CREAT SERPL-MCNC: 0.67 MG/DL — SIGNIFICANT CHANGE UP (ref 0.5–1.3)
EGFR: 120 ML/MIN/1.73M2 — SIGNIFICANT CHANGE UP
GLUCOSE SERPL-MCNC: 126 MG/DL — HIGH (ref 70–99)
HCT VFR BLD CALC: 33 % — LOW (ref 39–50)
HGB BLD-MCNC: 11 G/DL — LOW (ref 13–17)
MCHC RBC-ENTMCNC: 32.3 PG — SIGNIFICANT CHANGE UP (ref 27–34)
MCHC RBC-ENTMCNC: 33.3 GM/DL — SIGNIFICANT CHANGE UP (ref 32–36)
MCV RBC AUTO: 96.8 FL — SIGNIFICANT CHANGE UP (ref 80–100)
NRBC # BLD: 0 /100 WBCS — SIGNIFICANT CHANGE UP (ref 0–0)
PLATELET # BLD AUTO: 220 K/UL — SIGNIFICANT CHANGE UP (ref 150–400)
POTASSIUM SERPL-MCNC: 3.6 MMOL/L — SIGNIFICANT CHANGE UP (ref 3.5–5.3)
POTASSIUM SERPL-SCNC: 3.6 MMOL/L — SIGNIFICANT CHANGE UP (ref 3.5–5.3)
PROT SERPL-MCNC: 6.6 G/DL — SIGNIFICANT CHANGE UP (ref 6–8.3)
RBC # BLD: 3.41 M/UL — LOW (ref 4.2–5.8)
RBC # FLD: 18.9 % — HIGH (ref 10.3–14.5)
SODIUM SERPL-SCNC: 138 MMOL/L — SIGNIFICANT CHANGE UP (ref 135–145)
WBC # BLD: 4.76 K/UL — SIGNIFICANT CHANGE UP (ref 3.8–10.5)
WBC # FLD AUTO: 4.76 K/UL — SIGNIFICANT CHANGE UP (ref 3.8–10.5)

## 2024-06-18 PROCEDURE — 86900 BLOOD TYPING SEROLOGIC ABO: CPT

## 2024-06-18 PROCEDURE — 86850 RBC ANTIBODY SCREEN: CPT

## 2024-06-18 PROCEDURE — 82962 GLUCOSE BLOOD TEST: CPT

## 2024-06-18 PROCEDURE — 99285 EMERGENCY DEPT VISIT HI MDM: CPT | Mod: 25

## 2024-06-18 PROCEDURE — 93005 ELECTROCARDIOGRAM TRACING: CPT

## 2024-06-18 PROCEDURE — 99239 HOSP IP/OBS DSCHRG MGMT >30: CPT | Mod: GC

## 2024-06-18 PROCEDURE — 85027 COMPLETE CBC AUTOMATED: CPT

## 2024-06-18 PROCEDURE — 84100 ASSAY OF PHOSPHORUS: CPT

## 2024-06-18 PROCEDURE — 71045 X-RAY EXAM CHEST 1 VIEW: CPT

## 2024-06-18 PROCEDURE — 96361 HYDRATE IV INFUSION ADD-ON: CPT

## 2024-06-18 PROCEDURE — P9040: CPT

## 2024-06-18 PROCEDURE — 85730 THROMBOPLASTIN TIME PARTIAL: CPT

## 2024-06-18 PROCEDURE — 74177 CT ABD & PELVIS W/CONTRAST: CPT | Mod: MC

## 2024-06-18 PROCEDURE — 36415 COLL VENOUS BLD VENIPUNCTURE: CPT

## 2024-06-18 PROCEDURE — 82803 BLOOD GASES ANY COMBINATION: CPT

## 2024-06-18 PROCEDURE — 80053 COMPREHEN METABOLIC PANEL: CPT

## 2024-06-18 PROCEDURE — 96367 TX/PROPH/DG ADDL SEQ IV INF: CPT

## 2024-06-18 PROCEDURE — 76705 ECHO EXAM OF ABDOMEN: CPT

## 2024-06-18 PROCEDURE — 83690 ASSAY OF LIPASE: CPT

## 2024-06-18 PROCEDURE — 87640 STAPH A DNA AMP PROBE: CPT

## 2024-06-18 PROCEDURE — 96365 THER/PROPH/DIAG IV INF INIT: CPT

## 2024-06-18 PROCEDURE — 85025 COMPLETE CBC W/AUTO DIFF WBC: CPT

## 2024-06-18 PROCEDURE — 87641 MR-STAPH DNA AMP PROBE: CPT

## 2024-06-18 PROCEDURE — 36430 TRANSFUSION BLD/BLD COMPNT: CPT

## 2024-06-18 PROCEDURE — 86901 BLOOD TYPING SEROLOGIC RH(D): CPT

## 2024-06-18 PROCEDURE — 96375 TX/PRO/DX INJ NEW DRUG ADDON: CPT

## 2024-06-18 PROCEDURE — 85610 PROTHROMBIN TIME: CPT

## 2024-06-18 PROCEDURE — 80307 DRUG TEST PRSMV CHEM ANLYZR: CPT

## 2024-06-18 PROCEDURE — 96376 TX/PRO/DX INJ SAME DRUG ADON: CPT

## 2024-06-18 PROCEDURE — C1889: CPT

## 2024-06-18 PROCEDURE — 86923 COMPATIBILITY TEST ELECTRIC: CPT

## 2024-06-18 PROCEDURE — 83735 ASSAY OF MAGNESIUM: CPT

## 2024-06-18 RX ORDER — PANTOPRAZOLE SODIUM 40 MG/10ML
1 INJECTION, POWDER, FOR SOLUTION INTRAVENOUS
Qty: 28 | Refills: 0
Start: 2024-06-18 | End: 2024-07-01

## 2024-06-18 RX ORDER — MAGNESIUM, ALUMINUM HYDROXIDE 400-400
30 TABLET,CHEWABLE ORAL
Qty: 0 | Refills: 0 | DISCHARGE
Start: 2024-06-18

## 2024-06-18 RX ORDER — FOLIC ACID
1 POWDER (GRAM) MISCELLANEOUS
Qty: 0 | Refills: 0 | DISCHARGE
Start: 2024-06-18

## 2024-06-18 RX ADMIN — Medication 1 GRAM(S): at 05:22

## 2024-06-18 RX ADMIN — Medication 1 GRAM(S): at 11:07

## 2024-06-18 RX ADMIN — Medication 1 MILLIGRAM(S): at 11:06

## 2024-06-18 RX ADMIN — Medication 1 TABLET(S): at 11:07

## 2024-06-18 RX ADMIN — PANTOPRAZOLE SODIUM 40 MILLIGRAM(S): 40 INJECTION, POWDER, FOR SOLUTION INTRAVENOUS at 05:22

## 2024-06-18 RX ADMIN — Medication 1 GRAM(S): at 00:14

## 2024-07-03 RX ORDER — PANTOPRAZOLE SODIUM 40 MG/10ML
1 INJECTION, POWDER, FOR SOLUTION INTRAVENOUS
Qty: 30 | Refills: 0
Start: 2024-07-03 | End: 2024-08-01

## (undated) DEVICE — TUBING ENDO EXT OLYMPUS 160 24HR USE GI

## (undated) DEVICE — BASIN EMESIS 10IN GRADUATED MAUVE

## (undated) DEVICE — PLATE NESSY 170

## (undated) DEVICE — SALIVA EJECTOR (BLUE)

## (undated) DEVICE — LUBRICATING JELLY HR ONE SHOT 3G

## (undated) DEVICE — TUBING SUCTION NONCONDUCTIVE 6MM X 12FT

## (undated) DEVICE — PACK IV START WITH CHG

## (undated) DEVICE — GOWN LG

## (undated) DEVICE — SOLIDIFIER 1200CC

## (undated) DEVICE — TUBING IV SET GRAVITY 3Y 100" MACRO

## (undated) DEVICE — TUBING MEDI-VAC W MAXIGRIP CONNECTORS 1/4"X6'

## (undated) DEVICE — BIOPSY FORCEP COLD DISP

## (undated) DEVICE — BIOPSY FORCEP RADIAL JAW 4 STANDARD WITH NEEDLE

## (undated) DEVICE — ELCTR GROUNDING PAD ADULT COVIDIEN

## (undated) DEVICE — Device

## (undated) DEVICE — CATH IV SAFE BC 22G X 1" (BLUE)

## (undated) DEVICE — CONTAINER FORMALIN 10% 20ML

## (undated) DEVICE — DRSG CURITY GAUZE SPONGE 4 X 4" 12-PLY NON-STERILE

## (undated) DEVICE — DRSG 2X2

## (undated) DEVICE — ELCTR ECG CONDUCTIVE ADHESIVE

## (undated) DEVICE — DRSG BANDAID 0.75X3"

## (undated) DEVICE — FORCEP RADIAL JAW 4 HOT DISP